# Patient Record
Sex: MALE | Race: WHITE | NOT HISPANIC OR LATINO | Employment: OTHER | URBAN - METROPOLITAN AREA
[De-identification: names, ages, dates, MRNs, and addresses within clinical notes are randomized per-mention and may not be internally consistent; named-entity substitution may affect disease eponyms.]

---

## 2018-04-16 ENCOUNTER — HOSPITAL ENCOUNTER (EMERGENCY)
Facility: HOSPITAL | Age: 78
Discharge: OTHER INSTITUTION | End: 2018-04-16
Attending: EMERGENCY MEDICINE
Payer: OTHER GOVERNMENT

## 2018-04-16 ENCOUNTER — HOSPITAL ENCOUNTER (INPATIENT)
Facility: HOSPITAL | Age: 78
LOS: 4 days | Discharge: HOME | DRG: 581 | End: 2018-04-20
Attending: EMERGENCY MEDICINE | Admitting: HOSPITALIST
Payer: OTHER GOVERNMENT

## 2018-04-16 VITALS
HEIGHT: 67 IN | BODY MASS INDEX: 29.03 KG/M2 | HEART RATE: 80 BPM | TEMPERATURE: 98 F | OXYGEN SATURATION: 97 % | DIASTOLIC BLOOD PRESSURE: 87 MMHG | SYSTOLIC BLOOD PRESSURE: 142 MMHG | RESPIRATION RATE: 18 BRPM | WEIGHT: 185 LBS

## 2018-04-16 DIAGNOSIS — L08.9 FOREIGN BODY OF RIGHT MIDDLE FINGER WITH INFECTION: Primary | ICD-10-CM

## 2018-04-16 DIAGNOSIS — L03.011 CELLULITIS OF RIGHT INDEX FINGER: ICD-10-CM

## 2018-04-16 DIAGNOSIS — L03.011 CELLULITIS OF RIGHT INDEX FINGER: Primary | ICD-10-CM

## 2018-04-16 DIAGNOSIS — S60.452A FOREIGN BODY OF RIGHT MIDDLE FINGER WITH INFECTION: Primary | ICD-10-CM

## 2018-04-16 PROBLEM — I10 ESSENTIAL HYPERTENSION: Status: ACTIVE | Noted: 2018-04-16

## 2018-04-16 LAB
ANION GAP SERPL CALC-SCNC: 10 MMOL/L
BASOPHILS # BLD AUTO: 0 K/UL
BASOPHILS NFR BLD: 0.4 %
BUN SERPL-MCNC: 27 MG/DL
CALCIUM SERPL-MCNC: 9.4 MG/DL
CHLORIDE SERPL-SCNC: 100 MMOL/L
CO2 SERPL-SCNC: 25 MMOL/L
CREAT SERPL-MCNC: 1 MG/DL
DIFFERENTIAL METHOD: ABNORMAL
EOSINOPHIL # BLD AUTO: 0.1 K/UL
EOSINOPHIL NFR BLD: 0.9 %
ERYTHROCYTE [DISTWIDTH] IN BLOOD BY AUTOMATED COUNT: 13.6 %
EST. GFR  (AFRICAN AMERICAN): >60 ML/MIN/1.73 M^2
EST. GFR  (NON AFRICAN AMERICAN): >60 ML/MIN/1.73 M^2
GLUCOSE SERPL-MCNC: 98 MG/DL
HCT VFR BLD AUTO: 37.2 %
HGB BLD-MCNC: 12.4 G/DL
LYMPHOCYTES # BLD AUTO: 1.1 K/UL
LYMPHOCYTES NFR BLD: 10 %
MCH RBC QN AUTO: 30.2 PG
MCHC RBC AUTO-ENTMCNC: 33.4 G/DL
MCV RBC AUTO: 90 FL
MONOCYTES # BLD AUTO: 0.7 K/UL
MONOCYTES NFR BLD: 6.7 %
NEUTROPHILS # BLD AUTO: 8.9 K/UL
NEUTROPHILS NFR BLD: 82 %
PLATELET # BLD AUTO: 208 K/UL
PMV BLD AUTO: 7.9 FL
POTASSIUM SERPL-SCNC: 4.6 MMOL/L
RBC # BLD AUTO: 4.11 M/UL
SODIUM SERPL-SCNC: 135 MMOL/L
WBC # BLD AUTO: 10.9 K/UL

## 2018-04-16 PROCEDURE — 85025 COMPLETE CBC W/AUTO DIFF WBC: CPT

## 2018-04-16 PROCEDURE — S0077 INJECTION, CLINDAMYCIN PHOSP: HCPCS | Performed by: EMERGENCY MEDICINE

## 2018-04-16 PROCEDURE — 99284 EMERGENCY DEPT VISIT MOD MDM: CPT | Mod: 27

## 2018-04-16 PROCEDURE — 80048 BASIC METABOLIC PNL TOTAL CA: CPT

## 2018-04-16 PROCEDURE — 25000003 PHARM REV CODE 250: Performed by: EMERGENCY MEDICINE

## 2018-04-16 PROCEDURE — 73130 X-RAY EXAM OF HAND: CPT | Mod: TC,FY,RT

## 2018-04-16 PROCEDURE — 12000002 HC ACUTE/MED SURGE SEMI-PRIVATE ROOM

## 2018-04-16 PROCEDURE — 36415 COLL VENOUS BLD VENIPUNCTURE: CPT

## 2018-04-16 PROCEDURE — 96365 THER/PROPH/DIAG IV INF INIT: CPT

## 2018-04-16 PROCEDURE — 94760 N-INVAS EAR/PLS OXIMETRY 1: CPT

## 2018-04-16 PROCEDURE — 25000003 PHARM REV CODE 250: Performed by: NURSE PRACTITIONER

## 2018-04-16 PROCEDURE — 99285 EMERGENCY DEPT VISIT HI MDM: CPT | Mod: 25

## 2018-04-16 PROCEDURE — 73130 X-RAY EXAM OF HAND: CPT | Mod: 26,RT,, | Performed by: RADIOLOGY

## 2018-04-16 RX ORDER — HYDROCODONE BITARTRATE AND ACETAMINOPHEN 10; 325 MG/1; MG/1
1 TABLET ORAL EVERY 6 HOURS PRN
Status: DISCONTINUED | OUTPATIENT
Start: 2018-04-16 | End: 2018-04-16 | Stop reason: SDUPTHER

## 2018-04-16 RX ORDER — HYDROCODONE BITARTRATE AND ACETAMINOPHEN 5; 325 MG/1; MG/1
1 TABLET ORAL EVERY 6 HOURS PRN
Status: DISCONTINUED | OUTPATIENT
Start: 2018-04-16 | End: 2018-04-19 | Stop reason: SDUPTHER

## 2018-04-16 RX ORDER — OXYCODONE AND ACETAMINOPHEN 5; 325 MG/1; MG/1
1 TABLET ORAL
Status: COMPLETED | OUTPATIENT
Start: 2018-04-16 | End: 2018-04-16

## 2018-04-16 RX ORDER — HYDROCODONE BITARTRATE AND ACETAMINOPHEN 7.5; 325 MG/1; MG/1
1 TABLET ORAL
Status: DISCONTINUED | OUTPATIENT
Start: 2018-04-16 | End: 2018-04-16

## 2018-04-16 RX ORDER — OXYCODONE HYDROCHLORIDE 5 MG/1
5 TABLET ORAL EVERY 6 HOURS PRN
Status: DISCONTINUED | OUTPATIENT
Start: 2018-04-16 | End: 2018-04-18

## 2018-04-16 RX ORDER — ONDANSETRON 2 MG/ML
4 INJECTION INTRAMUSCULAR; INTRAVENOUS EVERY 8 HOURS PRN
Status: DISCONTINUED | OUTPATIENT
Start: 2018-04-16 | End: 2018-04-20 | Stop reason: HOSPADM

## 2018-04-16 RX ORDER — ACETAMINOPHEN 325 MG/1
650 TABLET ORAL EVERY 6 HOURS PRN
Status: DISCONTINUED | OUTPATIENT
Start: 2018-04-16 | End: 2018-04-18

## 2018-04-16 RX ORDER — AMOXICILLIN 250 MG
1 CAPSULE ORAL DAILY PRN
Status: DISCONTINUED | OUTPATIENT
Start: 2018-04-16 | End: 2018-04-20 | Stop reason: HOSPADM

## 2018-04-16 RX ORDER — CLINDAMYCIN PHOSPHATE 900 MG/50ML
900 INJECTION, SOLUTION INTRAVENOUS
Status: COMPLETED | OUTPATIENT
Start: 2018-04-16 | End: 2018-04-16

## 2018-04-16 RX ADMIN — HYDROCODONE BITARTRATE AND ACETAMINOPHEN 1 TABLET: 5; 325 TABLET ORAL at 10:04

## 2018-04-16 RX ADMIN — CLINDAMYCIN IN 5 PERCENT DEXTROSE 900 MG: 18 INJECTION, SOLUTION INTRAVENOUS at 08:04

## 2018-04-16 RX ADMIN — OXYCODONE HYDROCHLORIDE AND ACETAMINOPHEN 1 TABLET: 5; 325 TABLET ORAL at 06:04

## 2018-04-16 NOTE — ED NOTES
Pt accepted to [facility] by Dr. Sheehan in the [place]. Ortho Dr. Arroyo aware of pt. Phone number for report is [phone].

## 2018-04-16 NOTE — ED TRIAGE NOTES
C/o pain, swelling and redness to r middle finger. States was stuck by piece of glass Thursday. States removed small piece of glass and pus Saturday evening. reports swelling worse on Sunday.

## 2018-04-16 NOTE — ED NOTES
[facility] called for othro/plastics/hand specialist to admit the patient. Awaiting call back for acceptance.

## 2018-04-16 NOTE — PROVIDER PROGRESS NOTES - EMERGENCY DEPT.
Encounter Date: 4/16/2018    ED Physician Progress Notes        Physician Note:   1500 Spoke examined hand and xray. Plan to transfer

## 2018-04-16 NOTE — ED PROVIDER NOTES
Encounter Date: 4/16/2018       History     Chief Complaint   Patient presents with    Cellulitis     Patient cut right 3rd digit with glass last week. Finger/hand red, swollen and painful. Patient concerned about glass still in finger.        General Illness    The problem has been gradually worsening. The pain is at a severity of 8/10. Nothing relieves the symptoms. Nothing aggravates the symptoms. Pertinent negatives include no fever, no nausea, no sore throat, no shortness of breath and no rash.     Review of patient's allergies indicates:   Allergen Reactions    Aspirin (bulk) Swelling    Sulfa (sulfonamide antibiotics)      Past Medical History:   Diagnosis Date    Hypertension      Past Surgical History:   Procedure Laterality Date    CARPAL TUNNEL RELEASE Bilateral     HIP ARTHROPLASTY Left      No family history on file.  Social History   Substance Use Topics    Smoking status: Former Smoker     Quit date: 4/16/1997    Smokeless tobacco: Never Used    Alcohol use No     Review of Systems   Constitutional: Negative for fever.   HENT: Negative for sore throat.    Respiratory: Negative for shortness of breath.    Cardiovascular: Negative for chest pain.   Gastrointestinal: Negative for nausea.   Genitourinary: Negative for dysuria.   Musculoskeletal: Positive for joint swelling (Swelling right 3rd digit). Negative for back pain.   Skin: Negative for rash.   Neurological: Negative for weakness.   Hematological: Does not bruise/bleed easily.       Physical Exam     Initial Vitals [04/16/18 1217]   BP Pulse Resp Temp SpO2   117/89 93 18 98.1 °F (36.7 °C) 95 %      MAP       98.33         Physical Exam    Nursing note and vitals reviewed.  Constitutional: He appears well-developed and well-nourished.   HENT:   Head: Normocephalic.   Eyes: Pupils are equal, round, and reactive to light.   Neck: Normal range of motion.   Cardiovascular: Normal rate and regular rhythm.   Pulmonary/Chest: Breath sounds  normal.   Musculoskeletal:        Right hand: Right middle finger: Exhibits ecchymosis, swelling, tenderness and decreased ROM.        Left hand: Left middle finger: Exhibits swelling, tenderness and ecchymosis.   Neurological: He is alert and oriented to person, place, and time.   Skin: Skin is warm and dry.   Psychiatric: He has a normal mood and affect. His behavior is normal. Judgment and thought content normal.         ED Course   Procedures  Labs Reviewed - No data to display                 Imaging Results          X-Ray Hand 3 View Right (Final result)  Result time 04/16/18 13:45:06    Final result by Kayleigh Mchugh MD (04/16/18 13:45:06)                 Impression:      Soft tissue swelling right middle/long finger at the proximal interphalangeal joint.  Findings suggesting small foreign body in the distal aspect of the right long/middle finger anterior to the distal phalanx, seen on one view only with correlation recommended clinically.    Extensive changes at the wrist appearing similar although progressive compared to prior study of 12/20 7/2014.      Electronically signed by: Kayleigh Mchugh MD  Date:    04/16/2018  Time:    13:45             Narrative:    EXAMINATION:  XR HAND COMPLETE 3 VIEW RIGHT    CLINICAL HISTORY:  Poss. F.B 3rd digit;    TECHNIQUE:  Three views right hand    COMPARISON:  Right wrist 12/27/2014    FINDINGS:  There is soft tissue swelling right long/middle finger more so proximally at the proximal interphalangeal joint.  There is thin linear 2.5 mm in length density projecting in or over the soft tissues of the distal aspect of that right long/middle finger anterior to the distal phalanx, distal to the site of the soft tissue swelling.  This is seen on the lateral view only and could be artifact or be obscured by the bone on the other views.  Recommend correlation clinically as to if this this is site of suspected foreign body.    There is severe degenerative change,  juxta-articular ossifications, soft tissue swelling at the wrist.  There is severe narrowing of radial carpal joint between the radius and navicula with small misshapen navicular suggesting sequela of old fracture avascular necrosis of the navicular could be present.  These findings appears similar although progressive compared to the prior exam.    There is no acute fracture or dislocation.                                     Attending Attestation:   Physician Attestation Statement for Resident:  As the supervising MD   Physician Attestation Statement: I have personally seen and examined this patient.   I agree with the above history. -:   As the supervising MD I agree with the above PE.   -: No acute tenosynovitis    As the supervising MD I agree with the above treatment, course, plan, and disposition.   -: Rec transfer to Ortho for potential need of I and D   I have reviewed and agree with the residents interpretation of the following: x-rays.            Pt is transferred for ortho evaluation for possible need of I and D of this infected index.    The above noted FB at the fingerpad is non-acute by exam of patients recent injury.               Clinical Impression:   The encounter diagnosis was Cellulitis of right index finger.                           Tapan Nance MD  04/16/18 1758       Tapan Nance MD  07/14/18 1221       Tapan Nance MD  07/14/18 1221       Tapan Nance MD  07/24/18 7546

## 2018-04-17 PROCEDURE — 25000003 PHARM REV CODE 250: Performed by: NURSE PRACTITIONER

## 2018-04-17 PROCEDURE — 99253 IP/OBS CNSLTJ NEW/EST LOW 45: CPT | Mod: 25,,, | Performed by: ORTHOPAEDIC SURGERY

## 2018-04-17 PROCEDURE — 12000002 HC ACUTE/MED SURGE SEMI-PRIVATE ROOM

## 2018-04-17 PROCEDURE — 25000003 PHARM REV CODE 250: Performed by: HOSPITALIST

## 2018-04-17 PROCEDURE — 94761 N-INVAS EAR/PLS OXIMETRY MLT: CPT

## 2018-04-17 PROCEDURE — S0077 INJECTION, CLINDAMYCIN PHOSP: HCPCS | Performed by: NURSE PRACTITIONER

## 2018-04-17 RX ORDER — TRAMADOL HYDROCHLORIDE 50 MG/1
100 TABLET ORAL EVERY 4 HOURS PRN
Status: DISCONTINUED | OUTPATIENT
Start: 2018-04-17 | End: 2018-04-17

## 2018-04-17 RX ORDER — LISINOPRIL 10 MG/1
10 TABLET ORAL DAILY
Status: DISCONTINUED | OUTPATIENT
Start: 2018-04-17 | End: 2018-04-18

## 2018-04-17 RX ORDER — CLINDAMYCIN PHOSPHATE 900 MG/50ML
900 INJECTION, SOLUTION INTRAVENOUS
Status: DISCONTINUED | OUTPATIENT
Start: 2018-04-17 | End: 2018-04-20 | Stop reason: HOSPADM

## 2018-04-17 RX ORDER — CYCLOBENZAPRINE HCL 5 MG
5 TABLET ORAL 3 TIMES DAILY PRN
Status: DISCONTINUED | OUTPATIENT
Start: 2018-04-17 | End: 2018-04-18

## 2018-04-17 RX ADMIN — CLINDAMYCIN IN 5 PERCENT DEXTROSE 900 MG: 18 INJECTION, SOLUTION INTRAVENOUS at 02:04

## 2018-04-17 RX ADMIN — HYDROCODONE BITARTRATE AND ACETAMINOPHEN 1 TABLET: 5; 325 TABLET ORAL at 09:04

## 2018-04-17 RX ADMIN — CLINDAMYCIN IN 5 PERCENT DEXTROSE 900 MG: 18 INJECTION, SOLUTION INTRAVENOUS at 04:04

## 2018-04-17 RX ADMIN — HYDROCODONE BITARTRATE AND ACETAMINOPHEN 1 TABLET: 5; 325 TABLET ORAL at 04:04

## 2018-04-17 RX ADMIN — HYDROCODONE BITARTRATE AND ACETAMINOPHEN 1 TABLET: 5; 325 TABLET ORAL at 11:04

## 2018-04-17 RX ADMIN — OXYCODONE HYDROCHLORIDE 5 MG: 5 TABLET ORAL at 08:04

## 2018-04-17 RX ADMIN — OXYCODONE HYDROCHLORIDE 5 MG: 5 TABLET ORAL at 02:04

## 2018-04-17 RX ADMIN — OXYCODONE HYDROCHLORIDE 5 MG: 5 TABLET ORAL at 01:04

## 2018-04-17 RX ADMIN — SODIUM CHLORIDE 250 ML: 0.9 INJECTION, SOLUTION INTRAVENOUS at 06:04

## 2018-04-17 RX ADMIN — CLINDAMYCIN IN 5 PERCENT DEXTROSE 900 MG: 18 INJECTION, SOLUTION INTRAVENOUS at 09:04

## 2018-04-17 RX ADMIN — CLINDAMYCIN IN 5 PERCENT DEXTROSE 900 MG: 18 INJECTION, SOLUTION INTRAVENOUS at 08:04

## 2018-04-17 NOTE — SUBJECTIVE & OBJECTIVE
Past Medical History:   Diagnosis Date    Hypertension        Past Surgical History:   Procedure Laterality Date    CARPAL TUNNEL RELEASE Bilateral     HIP ARTHROPLASTY Left        Review of patient's allergies indicates:   Allergen Reactions    Aspirin (bulk) Swelling    Sulfa (sulfonamide antibiotics)        Current Facility-Administered Medications   Medication    acetaminophen tablet 650 mg    clindamycin 900 MG/50 ML D5W 900 mg/50 mL IVPB 900 mg    cyclobenzaprine tablet 5 mg    hydrocodone-acetaminophen 5-325mg per tablet 1 tablet    lisinopril tablet 10 mg    ondansetron injection 4 mg    oxyCODONE immediate release tablet 5 mg    senna-docusate 8.6-50 mg per tablet 1 tablet     Family History     Problem Relation (Age of Onset)    No Known Problems Mother, Father        Social History Main Topics    Smoking status: Former Smoker     Quit date: 4/16/1997    Smokeless tobacco: Never Used    Alcohol use No    Drug use: No    Sexual activity: Not on file     Review of Systems   Constitution: Negative for chills and fever.   HENT: Negative for congestion.    Eyes: Negative for blurred vision.   Cardiovascular: Negative for chest pain and syncope.   Respiratory: Negative for cough and shortness of breath.    Endocrine: Negative for polyuria.   Hematologic/Lymphatic: Negative for bleeding problem. Does not bruise/bleed easily.   Skin: Negative for rash.   Musculoskeletal: Positive for joint pain and joint swelling. Negative for falls, muscle cramps, muscle weakness and myalgias.   Gastrointestinal: Negative for abdominal pain, nausea and vomiting.   Genitourinary: Negative for flank pain.   Neurological: Negative for numbness and seizures.   Psychiatric/Behavioral: Negative for altered mental status.   Allergic/Immunologic: Negative for persistent infections.     Objective:     Vital Signs (Most Recent):  Temp: 98.1 °F (36.7 °C) (04/17/18 0722)  Pulse: 83 (04/17/18 0722)  Resp: 16 (04/17/18  "0722)  BP: 102/66 (04/17/18 0722)  SpO2: (!) 94 % (04/17/18 1000) Vital Signs (24h Range):  Temp:  [97.9 °F (36.6 °C)-98.1 °F (36.7 °C)] 98.1 °F (36.7 °C)  Pulse:  [62-93] 83  Resp:  [16-18] 16  SpO2:  [92 %-100 %] 94 %  BP: (102-142)/(66-97) 102/66        Height: 5' 7" (170.2 cm)  Body mass index is 28.98 kg/m².    No intake or output data in the 24 hours ending 04/17/18 1124    General    Nursing note and vitals reviewed.  Constitutional: He is oriented to person, place, and time. He appears well-developed and well-nourished. No distress.   HENT:   Nose: Nose normal.   Eyes: EOM are normal.   Cardiovascular: Regular rhythm.    Pulmonary/Chest: Effort normal.   Abdominal: Soft.   Neurological: He is alert and oriented to person, place, and time.   Psychiatric: His behavior is normal.             Right Hand/Wrist Exam     Swelling   Hand - The patient is swollen on the middle MCP and middle IP.    Other     Neuorologic Exam    Median Distribution: normal  Ulnar Distribution: normal  Radial Distribution: normal    Comments:  Small opening with drainage and redness along the ulnar aspect near the PIP joint. No fluctuance. No pain over palm or distally      Left Hand/Wrist Exam   Left hand exam is normal.          Vascular Exam       Capillary Refill  Right Hand: normal capillary refill      Significant Labs:   CBC:   Recent Labs  Lab 04/16/18 1952   WBC 10.90   HGB 12.4*   HCT 37.2*        CMP:   Recent Labs  Lab 04/16/18 1952   *   K 4.6      CO2 25   GLU 98   BUN 27*   CREATININE 1.0   CALCIUM 9.4   ANIONGAP 10   EGFRNONAA >60     CRP: No results for input(s): CRP in the last 48 hours.  All pertinent labs within the past 24 hours have been reviewed.    Significant Imaging: U/S: I have reviewed all pertinent results/findings and my personal findings are:  neg for abscess or foreign body  "

## 2018-04-17 NOTE — ED PROVIDER NOTES
"Encounter Date: 4/16/2018    SCRIBE #1 NOTE: I, Mike Mcfarland, am scribing for, and in the presence of, Dr. Sheehan .       History     Chief Complaint   Patient presents with    Wound Infection     with FB(glass) to right long finger       04/16/2018 7:37 PM     Chief complaint: Foreign body in right long finger      Larry Lepley is a 77 y.o. male with a hx of HTN who presents to the ED with complaints of foreign body in right long finger s/p injury x 4 days. The affected finger is swollen and erythematous. Pt states he attempted to separate his dogs and hit his hand in a window scattering the window. He immediately noticed the glass was in the finger. Transferred here for ortho consult, case d/w Dr. Arroyo by sending facility PTA.      The history is provided by the patient.     Review of patient's allergies indicates:   Allergen Reactions    Aspirin (bulk) Swelling    Sulfa (sulfonamide antibiotics)      Past Medical History:   Diagnosis Date    Hypertension      Past Surgical History:   Procedure Laterality Date    CARPAL TUNNEL RELEASE Bilateral     HIP ARTHROPLASTY Left      History reviewed. No pertinent family history.  Social History   Substance Use Topics    Smoking status: Former Smoker     Quit date: 4/16/1997    Smokeless tobacco: Never Used    Alcohol use No     Review of Systems   Gastrointestinal: Negative for diarrhea, nausea and vomiting.   Skin: Positive for color change and wound.        + for "foreign body" and "finger swelling"   Neurological: Negative for numbness.   All other systems reviewed and are negative.      Physical Exam     There were no vitals filed for this visit.    Physical Exam    Nursing note and vitals reviewed.  Constitutional: He appears well-developed and well-nourished. He is not diaphoretic.  Non-toxic appearance. He does not have a sickly appearance. He does not appear ill. No distress.   HENT:   Head: Normocephalic and atraumatic.   Eyes: EOM are normal. "   Neck: Normal range of motion. Neck supple. Normal range of motion present. No neck rigidity.   Cardiovascular: Normal rate, regular rhythm and normal heart sounds. Exam reveals no gallop and no friction rub.    No murmur heard.  Pulmonary/Chest: Breath sounds normal. No respiratory distress. He has no wheezes. He has no rhonchi. He has no rales.   Musculoskeletal: Normal range of motion.   Diffuse swollen to right middle finger. Small abrasion on the medial side of the PIP. No tenderness along flexor tendon sheath. No tenderness along hand. Diminished ROM. No obvious abscess. Not held in flexion, no pain on extension. No palm tenderness     Neurological: He is alert and oriented to person, place, and time.   Skin: Skin is warm and dry. No rash noted.   Psychiatric: He has a normal mood and affect. His behavior is normal. Judgment and thought content normal.         ED Course   Procedures  Labs Reviewed   CBC W/ AUTO DIFFERENTIAL   BASIC METABOLIC PANEL             Medical Decision Making:   History:   Old Medical Records: I decided to obtain old medical records.  Clinical Tests:   Lab Tests: Ordered and Reviewed  Radiological Study: Reviewed            Scribe Attestation:   Scribe #1: I performed the above scribed service and the documentation accurately describes the services I performed. I attest to the accuracy of the note.    I, Dr. Sheehan, personally performed the services described in this documentation. All medical record entries made by the scribe were at my direction and in my presence.  I have reviewed the chart and agree that the record reflects my personal performance and is accurate and complete.10:48 PM 04/16/2018            ED Course as of Apr 16 2006 Mon Apr 16, 2018 1948 Case d/w dr solis, [place] after [place]n  [EF]   1953 [facility] medicine to admit the patient  [EF]   2002 WBC: 10.90 [EF]      ED Course User Index  [EF] Giovany Sheehan MD     Clinical Impression:     1. Foreign  body of right middle finger with infection                RML finger infection, no abscess to drain, do not think flexor tenosynovitis, case d/w orthopedics. NPO after midnight.                                Giovany Sheehan MD  04/16/18 1528

## 2018-04-17 NOTE — ED NOTES
To Room:  308 by W/C    AA & O x 3, skin warm and dry, in NAD, Saline Lock in place and patent, site clean and dry.

## 2018-04-17 NOTE — HPI
Mr. Lepley is a 76yo M with a PMH of HTN. He had presented to [place] with c/o finger pain and swelling. He cut his 3rd digit to his right hand on glass 4 days ago while trying to separate his dogs from fighting. He did notice glass in his finger. His finger and hand Has had worseining symptoms of redness, pain, and swelling. While at [place], he had an Xray which showed a foreign object to the right 3rd finger. He was transferred to [facility] for orthopedics services. While in the ED here, Dr. Tripp was notified of the patient case. He recommends kepping patient NPO after midnight for possible I&D and he will see him in the morning. IV Clindamycin was initiated. He currently denies pain or discomforts.

## 2018-04-17 NOTE — ED NOTES
Patient identifiers for Larry Lepley checked and correct.  LOC:  Patient is awake, alert, and aware of environment with an appropriate affect. Patient is oriented x 3 and speaking appropriately.  APPEARANCE:  Patient resting comfortably and in no acute distress. Patient is clean and well groomed, patient's clothing is properly fastened.  SKIN:  The skin is warm and dry. Patient has normal skin turgor and moist mucus membranes. Right long finger is red, swollen and tender with decreased ROM, small puncture wound to side of finger with FB (glass) under skin.  MUSCULOSKELETAL:  Patient is moving all extremities well, no obvious deformities noted. Pulses intact.   RESPIRATORY:  Airway is open and patent. Respirations are spontaneous and non-labored with normal effort and rate.  CARDIAC:  Patient has a normal rate and rhythm. No peripheral edema noted.   ABDOMEN:  No distention noted.    NEUROLOGICAL:  PERRL. Facial expression is symmetrical.   Allergies reported:    Review of patient's allergies indicates:   Allergen Reactions    Aspirin (bulk) Swelling    Sulfa (sulfonamide antibiotics)      OTHER NOTES:  Transfer in with FB and infection to right long finger.

## 2018-04-17 NOTE — SUBJECTIVE & OBJECTIVE
Past Medical History:   Diagnosis Date    Hypertension        Past Surgical History:   Procedure Laterality Date    CARPAL TUNNEL RELEASE Bilateral     HIP ARTHROPLASTY Left        Review of patient's allergies indicates:   Allergen Reactions    Aspirin (bulk) Swelling    Sulfa (sulfonamide antibiotics)        Current Facility-Administered Medications on File Prior to Encounter   Medication    [COMPLETED] oxyCODONE-acetaminophen 5-325 mg per tablet 1 tablet    [DISCONTINUED] hydrocodone-acetaminophen 7.5-325mg per tablet 1 tablet     No current outpatient prescriptions on file prior to encounter.     Family History     Mother--Unk health problems  Father-D-Unk health problems        Social History Main Topics    Smoking status: Former Smoker     Quit date: 1997    Smokeless tobacco: Never Used    Alcohol use No    Drug use: No    Sexual activity: Not on file     Review of Systems   Constitutional: Negative for chills and fever.   HENT: Negative for congestion.    Eyes: Negative for visual disturbance.   Respiratory: Negative for shortness of breath.    Cardiovascular: Negative for chest pain and palpitations.   Gastrointestinal: Negative for abdominal pain, diarrhea, nausea and vomiting.   Genitourinary: Negative for dysuria.   Musculoskeletal: Negative for arthralgias.   Skin: Positive for color change and wound.   Neurological: Negative for dizziness, syncope and headaches.   Psychiatric/Behavioral: Negative for confusion and hallucinations.     Objective:     Vital Signs (Most Recent):    Vital Signs (24h Range):  Temp:  [98.1 °F (36.7 °C)] 98.1 °F (36.7 °C)  Pulse:  [75-93] 80  Resp:  [18] 18  SpO2:  [94 %-100 %] 97 %  BP: (116-142)/(87-97) 142/87        There is no height or weight on file to calculate BMI.    Physical Exam   Constitutional: He is oriented to person, place, and time. No distress.   HENT:   Head: Normocephalic.   Eyes: Pupils are equal, round, and reactive to light.    Neck: Normal range of motion. Neck supple. No JVD present. No tracheal deviation present.   Cardiovascular: Normal rate, regular rhythm, normal heart sounds and intact distal pulses.    Pulmonary/Chest: Effort normal and breath sounds normal. No respiratory distress.   Abdominal: Soft. Bowel sounds are normal. He exhibits no distension. There is no tenderness.   Musculoskeletal: Normal range of motion. He exhibits edema and tenderness.   3rd digit to right hand red, swollen, and tender   Neurological: He is alert and oriented to person, place, and time. No cranial nerve deficit.   Skin: Skin is warm and dry. Capillary refill takes less than 2 seconds. There is erythema.   Small abrasion on the medial side right 3rd digit   Psychiatric: He has a normal mood and affect. His behavior is normal. Judgment and thought content normal.         CRANIAL NERVES     CN III, IV, VI   Pupils are equal, round, and reactive to light.       Significant Labs:   BMP:   Recent Labs  Lab 04/16/18 1952   GLU 98   *   K 4.6      CO2 25   BUN 27*   CREATININE 1.0   CALCIUM 9.4     CBC:   Recent Labs  Lab 04/16/18 1952   WBC 10.90   HGB 12.4*   HCT 37.2*          Significant Imaging:     Right Hand Xray: Reviewed radiologist's report-- Impression   Soft tissue swelling right middle/long finger at the proximal interphalangeal joint.  Findings suggesting small foreign body in the distal aspect of the right long/middle finger anterior to the distal phalanx, seen on one view only with correlation recommended clinically.  Extensive changes at the wrist appearing similar although progressive compared to prior study of 12/20 7/2014.

## 2018-04-17 NOTE — PLAN OF CARE
Met with patient at bedside -re: [facility] primary benefit.  Patient's PCP is at [facility]- and patient receives all [facility] care thru the [facility] in [place]. Patient reports receives disability/services since 1980.  BIJAL North RN [facility]     04/17/18 8097   Discharge Assessment   Assessment Type Discharge Planning Assessment

## 2018-04-17 NOTE — PLAN OF CARE
Problem: Patient Care Overview  Goal: Plan of Care Review  Outcome: Ongoing (interventions implemented as appropriate)  Plan of care reviewed, patient verbalized understanding. AAOx4. Up ab dipak. PIV infusing per order. IV abx infusing per order. VS addressed. Remain afebrile throughout shift. Pain controlled with PRN meds. Right hand elevated on pillows per order. Bed in lowest position, side rails up x2, call light within reach. Patient remains free from fall and injury. Will continue to monitor.

## 2018-04-17 NOTE — CONSULTS
[facility]  Orthopedics  Consult Note    Patient Name: Larry Lepley  MRN: [id number]  Admission Date: 4/16/2018  Hospital Length of Stay: 1 days  Attending Provider: Carolee Garibay MD  Primary Care Provider: [facility] [place]    Patient information was obtained from patient, past medical records and ER records.     Inpatient consult to Orthopedic Surgery  Consult performed by: ALONZO CONLEY  Consult ordered by: SHERON MULLIGAN  Reason for consult: Finger infection        Subjective:     Principal Problem:Foreign body of right middle finger with infection    Chief Complaint:   Chief Complaint   Patient presents with    Wound Infection     with FB(glass) to right long finger        HPI: Mr. Lepley is a 78yo M with a PMH of HTN. He had presented to [facility] with c/o finger pain and swelling. He cut his 3rd digit to his right hand on glass 4 days ago while trying to separate his dogs from fighting. He did notice glass in his finger. His finger and hand Has had worseining symptoms of redness, pain, and swelling. While at [place], he had an Xray which showed a possible foreign object to the right 3rd finger. He was transferred to [facility] for orthopedics services. IV Clindamycin was initiated. US showed no abscess or foreign body    Past Medical History:   Diagnosis Date    Hypertension        Past Surgical History:   Procedure Laterality Date    CARPAL TUNNEL RELEASE Bilateral     HIP ARTHROPLASTY Left        Review of patient's allergies indicates:   Allergen Reactions    Aspirin (bulk) Swelling    Sulfa (sulfonamide antibiotics)        Current Facility-Administered Medications   Medication    acetaminophen tablet 650 mg    clindamycin 900 MG/50 ML D5W 900 mg/50 mL IVPB 900 mg    cyclobenzaprine tablet 5 mg    hydrocodone-acetaminophen 5-325mg per tablet 1 tablet    lisinopril tablet 10 mg    ondansetron injection 4 mg    oxyCODONE immediate release tablet 5 mg     "senna-docusate 8.6-50 mg per tablet 1 tablet     Family History     Problem Relation (Age of Onset)    No Known Problems Mother, Father        Social History Main Topics    Smoking status: Former Smoker     Quit date: 4/16/1997    Smokeless tobacco: Never Used    Alcohol use No    Drug use: No    Sexual activity: Not on file     Review of Systems   Constitution: Negative for chills and fever.   HENT: Negative for congestion.    Eyes: Negative for blurred vision.   Cardiovascular: Negative for chest pain and syncope.   Respiratory: Negative for cough and shortness of breath.    Endocrine: Negative for polyuria.   Hematologic/Lymphatic: Negative for bleeding problem. Does not bruise/bleed easily.   Skin: Negative for rash.   Musculoskeletal: Positive for joint pain and joint swelling. Negative for falls, muscle cramps, muscle weakness and myalgias.   Gastrointestinal: Negative for abdominal pain, nausea and vomiting.   Genitourinary: Negative for flank pain.   Neurological: Negative for numbness and seizures.   Psychiatric/Behavioral: Negative for altered mental status.   Allergic/Immunologic: Negative for persistent infections.     Objective:     Vital Signs (Most Recent):  Temp: 98.1 °F (36.7 °C) (04/17/18 0722)  Pulse: 83 (04/17/18 0722)  Resp: 16 (04/17/18 0722)  BP: 102/66 (04/17/18 0722)  SpO2: (!) 94 % (04/17/18 1000) Vital Signs (24h Range):  Temp:  [97.9 °F (36.6 °C)-98.1 °F (36.7 °C)] 98.1 °F (36.7 °C)  Pulse:  [62-93] 83  Resp:  [16-18] 16  SpO2:  [92 %-100 %] 94 %  BP: (102-142)/(66-97) 102/66        Height: 5' 7" (170.2 cm)  Body mass index is 28.98 kg/m².    No intake or output data in the 24 hours ending 04/17/18 1124    General    Nursing note and vitals reviewed.  Constitutional: He is oriented to person, place, and time. He appears well-developed and well-nourished. No distress.   HENT:   Nose: Nose normal.   Eyes: EOM are normal.   Cardiovascular: Regular rhythm.    Pulmonary/Chest: Effort " normal.   Abdominal: Soft.   Neurological: He is alert and oriented to person, place, and time.   Psychiatric: His behavior is normal.             Right Hand/Wrist Exam     Swelling   Hand - The patient is swollen on the middle MCP and middle IP.    Other     Neuorologic Exam    Median Distribution: normal  Ulnar Distribution: normal  Radial Distribution: normal    Comments:  Small opening with drainage and redness along the ulnar aspect near the PIP joint. No fluctuance. No pain over palm or distally      Left Hand/Wrist Exam   Left hand exam is normal.          Vascular Exam       Capillary Refill  Right Hand: normal capillary refill      Significant Labs:   CBC:   Recent Labs  Lab 04/16/18 1952   WBC 10.90   HGB 12.4*   HCT 37.2*        CMP:   Recent Labs  Lab 04/16/18 1952   *   K 4.6      CO2 25   GLU 98   BUN 27*   CREATININE 1.0   CALCIUM 9.4   ANIONGAP 10   EGFRNONAA >60     CRP: No results for input(s): CRP in the last 48 hours.  All pertinent labs within the past 24 hours have been reviewed.    Significant Imaging: U/S: I have reviewed all pertinent results/findings and my personal findings are:  neg for abscess or foreign body    Assessment/Plan:     * Foreign body of right middle finger with infection    No foreign body seen on Ultrasound.  Will repeat Xrays.  Continue IV antibiotics  Ok to eat today            Thank you for your consult. I will follow-up with patient. Please contact us if you have any additional questions.    Stan Arroyo MD  Orthopedics  [facility]

## 2018-04-17 NOTE — PLAN OF CARE
Met with pt to complete his assessment.  Educated pt on the blue discharge folder and left the folder in the room.  Pt was [facility] for ortho services.  Pt, who is independent with his self care, has a home BP cuff, rollator and SC, denies home health services.  Pt stated that he lives with his exwife's step-daughter Kathleen who reportedly had pt's phone.  Pt was not able to verify his daughters phone number or provide me with his PCP's name.  Pt verified his [facility] as the [facility].  I called Kathleen who provided me with her last name Damari and phone number [phone].  I added her as an emergency  and updated pt's daughter Milena Danielle's correct phone # [phone]. [facility] [place] [place].  Updated admissions who denied having her name in Epic. CG Kathleen is aware that she will have to find transportation to transport pt home upon his discharge.  Kathleen is requesting to speak to pt's physician.  Updated Dr. Garibay.      04/17/18 1450   Discharge Assessment   Assessment Type Discharge Planning Assessment   Confirmed/corrected address and phone number on facesheet? Yes   Assessment information obtained from? Patient;Other  (Pt and pt's CG Kathleen by phone.)   Prior to hospitilization cognitive status: Alert/Oriented   Prior to hospitalization functional status: Independent   Current cognitive status: Alert/Oriented   Current Functional Status: Independent   Lives With other (see comments)  (Pt lives with his ex wife's step-daughter Kathleen Wetzel. )   Able to Return to Prior Arrangements yes   Is patient able to care for self after discharge? Yes   Who are your caregiver(s) and their phone number(s)? (CG Kathleen Huizar, [phone])   Patient's perception of discharge disposition home or selfcare   Readmission Within The Last 30 Days no previous admission in last 30 days   Patient currently being followed by outpatient  case management? No   Patient currently receives any other outside agency services? No   Equipment Currently Used at Home rollator;shower chair;other (see comments)  (BP cuff)   Do you have any problems affording any of your prescribed medications? No  ([facility] [place])   Is the patient taking medications as prescribed? yes   Does the patient have transportation home? Yes   Transportation Available family or friend will provide   Does the patient receive services at the [facility]? No   Discharge Plan A Home with family   Patient/Family In Agreement With Plan yes

## 2018-04-17 NOTE — HPI
Mr. Lepley is a 76yo M with a PMH of HTN. He had presented to [facility] with c/o finger pain and swelling. He cut his 3rd digit to his right hand on glass 4 days ago while trying to separate his dogs from fighting. He did notice glass in his finger. His finger and hand Has had worseining symptoms of redness, pain, and swelling. While at [place], he had an Xray which showed a possible foreign object to the right 3rd finger. He was transferred to [facility] for [place]opedics services. IV Clindamycin was initiated. US showed no abscess or foreign body

## 2018-04-17 NOTE — H&P
[facility] Hospital Medicine  History & Physical    Patient Name: Larry Lepley  MRN: [id number]  Admission Date: 2018  Attending Physician: Carolee Garibay MD   Primary Care Provider: [facility] [place]         Patient information was obtained from patient and ER records.     Subjective:     Principal Problem:Foreign body of right middle finger with infection    Chief Complaint:   Chief Complaint   Patient presents with    Wound Infection     with FB(glass) to right long finger        HPI: Mr. Lepley is a 78yo M with a PMH of HTN. He had presented to [facility] with c/o finger pain and swelling. He cut his 3rd digit to his right hand on glass 4 days ago while trying to separate his dogs from fighting. He did notice glass in his finger. His finger and hand Has had worseining symptoms of redness, pain, and swelling. While at [place], he had an Xray which showed a foreign object to the right 3rd finger. He was transferred to [facility] for orthopedics services. While in the ED here, Dr. Tripp was notified of the patient case. He recommends kepping patient NPO after midnight for possible I&D and he will see him in the morning. IV Clindamycin was initiated. He currently denies pain or discomforts.          Past Medical History:   Diagnosis Date    Hypertension        Past Surgical History:   Procedure Laterality Date    CARPAL TUNNEL RELEASE Bilateral     HIP ARTHROPLASTY Left        Review of patient's allergies indicates:   Allergen Reactions    Aspirin (bulk) Swelling    Sulfa (sulfonamide antibiotics)        Current Facility-Administered Medications on File Prior to Encounter   Medication    [COMPLETED] oxyCODONE-acetaminophen 5-325 mg per tablet 1 tablet    [DISCONTINUED] hydrocodone-acetaminophen 7.5-325mg per tablet 1 tablet     No current outpatient prescriptions on file prior to encounter.     Family History     Mother--Unk health problems  Father-D-Unk  health problems        Social History Main Topics    Smoking status: Former Smoker     Quit date: 4/16/1997    Smokeless tobacco: Never Used    Alcohol use No    Drug use: No    Sexual activity: Not on file     Review of Systems   Constitutional: Negative for chills and fever.   HENT: Negative for congestion.    Eyes: Negative for visual disturbance.   Respiratory: Negative for shortness of breath.    Cardiovascular: Negative for chest pain and palpitations.   Gastrointestinal: Negative for abdominal pain, diarrhea, nausea and vomiting.   Genitourinary: Negative for dysuria.   Musculoskeletal: Negative for arthralgias.   Skin: Positive for color change and wound.   Neurological: Negative for dizziness, syncope and headaches.   Psychiatric/Behavioral: Negative for confusion and hallucinations.     Objective:     Vital Signs (Most Recent):    Vital Signs (24h Range):  Temp:  [98.1 °F (36.7 °C)] 98.1 °F (36.7 °C)  Pulse:  [75-93] 80  Resp:  [18] 18  SpO2:  [94 %-100 %] 97 %  BP: (116-142)/(87-97) 142/87        There is no height or weight on file to calculate BMI.    Physical Exam   Constitutional: He is oriented to person, place, and time. No distress.   HENT:   Head: Normocephalic.   Eyes: Pupils are equal, round, and reactive to light.   Neck: Normal range of motion. Neck supple. No JVD present. No tracheal deviation present.   Cardiovascular: Normal rate, regular rhythm, normal heart sounds and intact distal pulses.    Pulmonary/Chest: Effort normal and breath sounds normal. No respiratory distress.   Abdominal: Soft. Bowel sounds are normal. He exhibits no distension. There is no tenderness.   Musculoskeletal: Normal range of motion. He exhibits edema and tenderness.   3rd digit to right hand red, swollen, and tender   Neurological: He is alert and oriented to person, place, and time. No cranial nerve deficit.   Skin: Skin is warm and dry. Capillary refill takes less than 2 seconds. There is erythema.   Small  abrasion on the medial side right 3rd digit   Psychiatric: He has a normal mood and affect. His behavior is normal. Judgment and thought content normal.         CRANIAL NERVES     CN III, IV, VI   Pupils are equal, round, and reactive to light.       Significant Labs:   BMP:   Recent Labs  Lab 04/16/18 1952   GLU 98   *   K 4.6      CO2 25   BUN 27*   CREATININE 1.0   CALCIUM 9.4     CBC:   Recent Labs  Lab 04/16/18 1952   WBC 10.90   HGB 12.4*   HCT 37.2*          Significant Imaging:     Right Hand Xray: Reviewed radiologist's report-- Impression   Soft tissue swelling right middle/long finger at the proximal interphalangeal joint.  Findings suggesting small foreign body in the distal aspect of the right long/middle finger anterior to the distal phalanx, seen on one view only with correlation recommended clinically.  Extensive changes at the wrist appearing similar although progressive compared to prior study of 12/20 7/2014.         Assessment/Plan:     * Foreign body of right middle finger with infection    IV Clindamycin  Consult orthopedics  NPO after midnight for possible I&D  Pain management          Essential hypertension    Chronic/Controlled. Continue chronic medications, adjusting as needed.            VTE Risk Mitigation         Ordered     IP VTE LOW RISK PATIENT  Once      04/16/18 2144             Sara Rucker NP  Department of Hospital Medicine   [facility]

## 2018-04-18 PROCEDURE — 12000002 HC ACUTE/MED SURGE SEMI-PRIVATE ROOM

## 2018-04-18 PROCEDURE — 94761 N-INVAS EAR/PLS OXIMETRY MLT: CPT

## 2018-04-18 PROCEDURE — 25000003 PHARM REV CODE 250: Performed by: NURSE PRACTITIONER

## 2018-04-18 PROCEDURE — S0077 INJECTION, CLINDAMYCIN PHOSP: HCPCS | Performed by: NURSE PRACTITIONER

## 2018-04-18 PROCEDURE — 99232 SBSQ HOSP IP/OBS MODERATE 35: CPT | Mod: ,,, | Performed by: ORTHOPAEDIC SURGERY

## 2018-04-18 RX ORDER — ACETAMINOPHEN 500 MG
1000 TABLET ORAL EVERY 6 HOURS PRN
Status: DISCONTINUED | OUTPATIENT
Start: 2018-04-18 | End: 2018-04-20 | Stop reason: HOSPADM

## 2018-04-18 RX ORDER — LISINOPRIL 2.5 MG/1
2.5 TABLET ORAL DAILY
Status: DISCONTINUED | OUTPATIENT
Start: 2018-04-19 | End: 2018-04-20 | Stop reason: HOSPADM

## 2018-04-18 RX ADMIN — CLINDAMYCIN IN 5 PERCENT DEXTROSE 900 MG: 18 INJECTION, SOLUTION INTRAVENOUS at 08:04

## 2018-04-18 RX ADMIN — OXYCODONE HYDROCHLORIDE 5 MG: 5 TABLET ORAL at 02:04

## 2018-04-18 RX ADMIN — CLINDAMYCIN IN 5 PERCENT DEXTROSE 900 MG: 18 INJECTION, SOLUTION INTRAVENOUS at 02:04

## 2018-04-18 RX ADMIN — OXYCODONE HYDROCHLORIDE 5 MG: 5 TABLET ORAL at 01:04

## 2018-04-18 RX ADMIN — HYDROCODONE BITARTRATE AND ACETAMINOPHEN 1 TABLET: 5; 325 TABLET ORAL at 10:04

## 2018-04-18 RX ADMIN — OXYCODONE HYDROCHLORIDE 5 MG: 5 TABLET ORAL at 07:04

## 2018-04-18 RX ADMIN — HYDROCODONE BITARTRATE AND ACETAMINOPHEN 1 TABLET: 5; 325 TABLET ORAL at 03:04

## 2018-04-18 RX ADMIN — HYDROCODONE BITARTRATE AND ACETAMINOPHEN 1 TABLET: 5; 325 TABLET ORAL at 05:04

## 2018-04-18 NOTE — PROGRESS NOTES
04/18/18 0845   Patient Assessment/Suction   Level of Consciousness (AVPU) alert   PRE-TX-O2-ETCO2   O2 Device (Oxygen Therapy) room air   SpO2 97 %   Pulse Oximetry Type Intermittent   $ Pulse Oximetry - Multiple Charge Pulse Oximetry - Multiple

## 2018-04-18 NOTE — PLAN OF CARE
Problem: Patient Care Overview  Goal: Plan of Care Review  Outcome: Ongoing (interventions implemented as appropriate)  Plan of care reviewed, patient verbalized understanding. AAOx4. IV abx infusing per order. VS addressed. Urinal at bedside, voiding freely. No BM noted, but passing flatulence frequent. Right hand elevated on pillows per orders. Remain afebrile throughout shift. Pain controlled with PRN meds. Bed in lowest position, side rails x2, call light within reach. Patient remains free from fall and injury. Will continue to monitor.

## 2018-04-18 NOTE — PLAN OF CARE
Problem: Patient Care Overview  Goal: Plan of Care Review  Outcome: Ongoing (interventions implemented as appropriate)  Pt had an uneventful night free from fall or injury.  Pt slept well awakening for pain medication intermittently.  A PIV remains in place with IV abx infusing as ordered.  Pt is tolerating a diet with no nausea. Pt's BP was stable in the low 100's overnight; voiding freely per urinal.  No BM over night but pt passing flatus.  Pt was updated on POC and given the opportunity to ask questions.  Call bell and bed side table were placed within reach and pt was informed that the nurse would be rounding hourly to ensure pt safety.

## 2018-04-19 ENCOUNTER — ANESTHESIA EVENT (OUTPATIENT)
Dept: SURGERY | Facility: HOSPITAL | Age: 78
DRG: 581 | End: 2018-04-19
Payer: OTHER GOVERNMENT

## 2018-04-19 ENCOUNTER — ANESTHESIA (OUTPATIENT)
Dept: SURGERY | Facility: HOSPITAL | Age: 78
DRG: 581 | End: 2018-04-19
Payer: OTHER GOVERNMENT

## 2018-04-19 ENCOUNTER — SURGERY (OUTPATIENT)
Age: 78
End: 2018-04-19

## 2018-04-19 PROCEDURE — 94761 N-INVAS EAR/PLS OXIMETRY MLT: CPT

## 2018-04-19 PROCEDURE — 63600175 PHARM REV CODE 636 W HCPCS: Performed by: NURSE ANESTHETIST, CERTIFIED REGISTERED

## 2018-04-19 PROCEDURE — 63600175 PHARM REV CODE 636 W HCPCS: Performed by: NURSE PRACTITIONER

## 2018-04-19 PROCEDURE — 87070 CULTURE OTHR SPECIMN AEROBIC: CPT

## 2018-04-19 PROCEDURE — D9220A PRA ANESTHESIA: Mod: ,,, | Performed by: ANESTHESIOLOGY

## 2018-04-19 PROCEDURE — 36000704 HC OR TIME LEV I 1ST 15 MIN: Performed by: ORTHOPAEDIC SURGERY

## 2018-04-19 PROCEDURE — 25000003 PHARM REV CODE 250: Performed by: NURSE PRACTITIONER

## 2018-04-19 PROCEDURE — 12000002 HC ACUTE/MED SURGE SEMI-PRIVATE ROOM

## 2018-04-19 PROCEDURE — S0077 INJECTION, CLINDAMYCIN PHOSP: HCPCS | Performed by: NURSE PRACTITIONER

## 2018-04-19 PROCEDURE — 71000033 HC RECOVERY, INTIAL HOUR: Performed by: ORTHOPAEDIC SURGERY

## 2018-04-19 PROCEDURE — 63600175 PHARM REV CODE 636 W HCPCS: Performed by: ANESTHESIOLOGY

## 2018-04-19 PROCEDURE — 37000009 HC ANESTHESIA EA ADD 15 MINS: Performed by: ORTHOPAEDIC SURGERY

## 2018-04-19 PROCEDURE — 37000008 HC ANESTHESIA 1ST 15 MINUTES: Performed by: ORTHOPAEDIC SURGERY

## 2018-04-19 PROCEDURE — 25000003 PHARM REV CODE 250: Performed by: ANESTHESIOLOGY

## 2018-04-19 PROCEDURE — 36000705 HC OR TIME LEV I EA ADD 15 MIN: Performed by: ORTHOPAEDIC SURGERY

## 2018-04-19 PROCEDURE — 27200651 HC AIRWAY, LMA: Performed by: NURSE ANESTHETIST, CERTIFIED REGISTERED

## 2018-04-19 PROCEDURE — 87075 CULTR BACTERIA EXCEPT BLOOD: CPT

## 2018-04-19 PROCEDURE — 71000039 HC RECOVERY, EACH ADD'L HOUR: Performed by: ORTHOPAEDIC SURGERY

## 2018-04-19 PROCEDURE — 87186 SC STD MICRODIL/AGAR DIL: CPT

## 2018-04-19 PROCEDURE — 87077 CULTURE AEROBIC IDENTIFY: CPT

## 2018-04-19 PROCEDURE — 26010 DRAINAGE OF FINGER ABSCESS: CPT | Mod: RT,,, | Performed by: ORTHOPAEDIC SURGERY

## 2018-04-19 PROCEDURE — 99900103 DSU ONLY-NO CHARGE-INITIAL HR (STAT): Performed by: ORTHOPAEDIC SURGERY

## 2018-04-19 PROCEDURE — 0J9J0ZZ DRAINAGE OF RIGHT HAND SUBCUTANEOUS TISSUE AND FASCIA, OPEN APPROACH: ICD-10-PCS | Performed by: ORTHOPAEDIC SURGERY

## 2018-04-19 PROCEDURE — 99900104 DSU ONLY-NO CHARGE-EA ADD'L HR (STAT): Performed by: ORTHOPAEDIC SURGERY

## 2018-04-19 RX ORDER — HYDROCODONE BITARTRATE AND ACETAMINOPHEN 5; 325 MG/1; MG/1
1 TABLET ORAL EVERY 4 HOURS PRN
Status: DISCONTINUED | OUTPATIENT
Start: 2018-04-19 | End: 2018-04-20 | Stop reason: HOSPADM

## 2018-04-19 RX ORDER — FENTANYL CITRATE 50 UG/ML
25 INJECTION, SOLUTION INTRAMUSCULAR; INTRAVENOUS EVERY 5 MIN PRN
Status: DISCONTINUED | OUTPATIENT
Start: 2018-04-19 | End: 2018-04-19 | Stop reason: SDUPTHER

## 2018-04-19 RX ORDER — PROPOFOL 10 MG/ML
VIAL (ML) INTRAVENOUS
Status: DISCONTINUED | OUTPATIENT
Start: 2018-04-19 | End: 2018-04-19

## 2018-04-19 RX ORDER — ACETAMINOPHEN 10 MG/ML
INJECTION, SOLUTION INTRAVENOUS
Status: DISCONTINUED | OUTPATIENT
Start: 2018-04-19 | End: 2018-04-19

## 2018-04-19 RX ORDER — SODIUM CHLORIDE 0.9 % (FLUSH) 0.9 %
5 SYRINGE (ML) INJECTION
Status: DISCONTINUED | OUTPATIENT
Start: 2018-04-19 | End: 2018-04-20 | Stop reason: HOSPADM

## 2018-04-19 RX ORDER — OXYCODONE HYDROCHLORIDE 5 MG/1
5 TABLET ORAL ONCE AS NEEDED
Status: COMPLETED | OUTPATIENT
Start: 2018-04-19 | End: 2018-04-19

## 2018-04-19 RX ORDER — DEXAMETHASONE SODIUM PHOSPHATE 4 MG/ML
INJECTION, SOLUTION INTRA-ARTICULAR; INTRALESIONAL; INTRAMUSCULAR; INTRAVENOUS; SOFT TISSUE
Status: DISCONTINUED | OUTPATIENT
Start: 2018-04-19 | End: 2018-04-19

## 2018-04-19 RX ORDER — FENTANYL CITRATE 50 UG/ML
INJECTION, SOLUTION INTRAMUSCULAR; INTRAVENOUS
Status: DISCONTINUED | OUTPATIENT
Start: 2018-04-19 | End: 2018-04-19

## 2018-04-19 RX ORDER — MORPHINE SULFATE 2 MG/ML
2 INJECTION, SOLUTION INTRAMUSCULAR; INTRAVENOUS EVERY 4 HOURS PRN
Status: DISCONTINUED | OUTPATIENT
Start: 2018-04-19 | End: 2018-04-20 | Stop reason: HOSPADM

## 2018-04-19 RX ORDER — LIDOCAINE HCL/PF 100 MG/5ML
SYRINGE (ML) INTRAVENOUS
Status: DISCONTINUED | OUTPATIENT
Start: 2018-04-19 | End: 2018-04-19

## 2018-04-19 RX ORDER — ONDANSETRON 2 MG/ML
4 INJECTION INTRAMUSCULAR; INTRAVENOUS ONCE AS NEEDED
Status: DISCONTINUED | OUTPATIENT
Start: 2018-04-19 | End: 2018-04-19

## 2018-04-19 RX ORDER — ONDANSETRON HYDROCHLORIDE 2 MG/ML
INJECTION, SOLUTION INTRAMUSCULAR; INTRAVENOUS
Status: DISCONTINUED | OUTPATIENT
Start: 2018-04-19 | End: 2018-04-19

## 2018-04-19 RX ORDER — OXYCODONE HYDROCHLORIDE 5 MG/1
5 TABLET ORAL ONCE AS NEEDED
Status: DISCONTINUED | OUTPATIENT
Start: 2018-04-19 | End: 2018-04-19 | Stop reason: SDUPTHER

## 2018-04-19 RX ORDER — ONDANSETRON 2 MG/ML
4 INJECTION INTRAMUSCULAR; INTRAVENOUS ONCE AS NEEDED
Status: DISCONTINUED | OUTPATIENT
Start: 2018-04-19 | End: 2018-04-19 | Stop reason: SDUPTHER

## 2018-04-19 RX ORDER — FENTANYL CITRATE 50 UG/ML
25 INJECTION, SOLUTION INTRAMUSCULAR; INTRAVENOUS EVERY 5 MIN PRN
Status: DISCONTINUED | OUTPATIENT
Start: 2018-04-19 | End: 2018-04-19

## 2018-04-19 RX ORDER — MIDAZOLAM HYDROCHLORIDE 1 MG/ML
INJECTION INTRAMUSCULAR; INTRAVENOUS
Status: DISCONTINUED | OUTPATIENT
Start: 2018-04-19 | End: 2018-04-19

## 2018-04-19 RX ADMIN — FENTANYL CITRATE 50 MCG: 50 INJECTION, SOLUTION INTRAMUSCULAR; INTRAVENOUS at 05:04

## 2018-04-19 RX ADMIN — PROPOFOL 150 MG: 10 INJECTION, EMULSION INTRAVENOUS at 05:04

## 2018-04-19 RX ADMIN — FENTANYL CITRATE 25 MCG: 50 INJECTION, SOLUTION INTRAMUSCULAR; INTRAVENOUS at 06:04

## 2018-04-19 RX ADMIN — MIDAZOLAM HYDROCHLORIDE 2 MG: 1 INJECTION, SOLUTION INTRAMUSCULAR; INTRAVENOUS at 05:04

## 2018-04-19 RX ADMIN — LIDOCAINE HYDROCHLORIDE 100 MG: 20 INJECTION, SOLUTION INTRAVENOUS at 05:04

## 2018-04-19 RX ADMIN — Medication 2 MG: at 07:04

## 2018-04-19 RX ADMIN — DEXAMETHASONE SODIUM PHOSPHATE 4 MG: 4 INJECTION, SOLUTION INTRAMUSCULAR; INTRAVENOUS at 05:04

## 2018-04-19 RX ADMIN — OXYCODONE HYDROCHLORIDE 5 MG: 5 TABLET ORAL at 06:04

## 2018-04-19 RX ADMIN — ACETAMINOPHEN 1000 MG: 10 INJECTION, SOLUTION INTRAVENOUS at 05:04

## 2018-04-19 RX ADMIN — SODIUM CHLORIDE, SODIUM GLUCONATE, SODIUM ACETATE, POTASSIUM CHLORIDE, MAGNESIUM CHLORIDE, SODIUM PHOSPHATE, DIBASIC, AND POTASSIUM PHOSPHATE: .53; .5; .37; .037; .03; .012; .00082 INJECTION, SOLUTION INTRAVENOUS at 03:04

## 2018-04-19 RX ADMIN — CLINDAMYCIN IN 5 PERCENT DEXTROSE 900 MG: 18 INJECTION, SOLUTION INTRAVENOUS at 02:04

## 2018-04-19 RX ADMIN — HYDROCODONE BITARTRATE AND ACETAMINOPHEN 1 TABLET: 5; 325 TABLET ORAL at 12:04

## 2018-04-19 RX ADMIN — Medication 2 MG: at 11:04

## 2018-04-19 RX ADMIN — CLINDAMYCIN IN 5 PERCENT DEXTROSE 900 MG: 18 INJECTION, SOLUTION INTRAVENOUS at 08:04

## 2018-04-19 RX ADMIN — ONDANSETRON 4 MG: 2 INJECTION, SOLUTION INTRAMUSCULAR; INTRAVENOUS at 05:04

## 2018-04-19 RX ADMIN — SODIUM CHLORIDE, SODIUM GLUCONATE, SODIUM ACETATE, POTASSIUM CHLORIDE, MAGNESIUM CHLORIDE, SODIUM PHOSPHATE, DIBASIC, AND POTASSIUM PHOSPHATE: .53; .5; .37; .037; .03; .012; .00082 INJECTION, SOLUTION INTRAVENOUS at 05:04

## 2018-04-19 RX ADMIN — CLINDAMYCIN IN 5 PERCENT DEXTROSE 900 MG: 18 INJECTION, SOLUTION INTRAVENOUS at 09:04

## 2018-04-19 NOTE — PROGRESS NOTES
[facility] Medicine  Progress Note    Patient Name: Larry Lepley  MRN: [id number]  Patient Class: IP- Inpatient   Admission Date: 4/16/2018  Length of Stay: 2 days  Attending Physician: Davi Jarvis MD  Primary Care Provider: [facility]        Subjective:     Principal Problem:Foreign body of right middle finger with infection    HPI:  Mr. Lepley is a 76yo M with a PMH of HTN. He had presented to [facility] with c/o finger pain and swelling. He cut his 3rd digit to his right hand on glass 4 days ago while trying to separate his dogs from fighting. He did notice glass in his finger. His finger and hand Has had worseining symptoms of redness, pain, and swelling. While at [facility], he had an Xray which showed a foreign object to the right 3rd finger. He [place] [facility] [place]. While in the ED here, Dr. Tripp was notified of the patient case. He recommends kepping patient NPO after midnight for possible I&D and he will see him in the morning. IV Clindamycin was initiated. He currently denies pain or discomforts.          Hospital Course:  No notes on file    Interval History: Patient seen and examined.  No acute events overnight.  Blood pressure tends to be on the lower side the patient has been stacking his pain medications every 3 hours instead of every 6 as it was intended.  Patient's case discussed with orthopedics.    Review of Systems   Constitutional: Negative for chills, fatigue and fever.   Respiratory: Negative for cough and shortness of breath.    Cardiovascular: Negative for chest pain and leg swelling.   Gastrointestinal: Negative for abdominal pain, nausea and vomiting.   Musculoskeletal: Positive for joint swelling. Negative for back pain.   Neurological: Negative for weakness.   Psychiatric/Behavioral: Negative for confusion. The patient is not nervous/anxious.      Objective:     Vital Signs (Most Recent):  Temp: 98.3 °F  (36.8 °C) (04/18/18 2028)  Pulse: 85 (04/18/18 2028)  Resp: 16 (04/18/18 2028)  BP: 113/61 (04/18/18 2028)  SpO2: 95 % (04/18/18 2028) Vital Signs (24h Range):  Temp:  [97.2 °F (36.2 °C)-98.3 °F (36.8 °C)] 98.3 °F (36.8 °C)  Pulse:  [69-92] 85  Resp:  [16-18] 16  SpO2:  [92 %-98 %] 95 %  BP: ()/(51-65) 113/61     Weight: 80.3 kg (177 lb 0.5 oz)  Body mass index is 27.73 kg/m².    Intake/Output Summary (Last 24 hours) at 04/18/18 2203  Last data filed at 04/18/18 1753   Gross per 24 hour   Intake             1121 ml   Output              750 ml   Net              371 ml      Physical Exam   Constitutional: He appears well-developed and well-nourished.   Eyes: EOM are normal. Pupils are equal, round, and reactive to light.   Neck: No JVD present.   Cardiovascular: Normal rate, regular rhythm, normal heart sounds and intact distal pulses.    Pulmonary/Chest: Effort normal and breath sounds normal.   Abdominal: Soft. Bowel sounds are normal. He exhibits no distension. There is no tenderness.   Musculoskeletal: He exhibits deformity. He exhibits no edema.   Noted erythema and swelling on R hand 3rd digit.   Lymphadenopathy:     He has no cervical adenopathy.   Skin: No rash noted. No pallor.   Nursing note and vitals reviewed.      Significant Labs: All pertinent labs within the past 24 hours have been reviewed.    Significant Imaging: I have reviewed all pertinent imaging results/findings within the past 24 hours.    Assessment/Plan:      * Foreign body of right middle finger with infection    patient continues on clindamycin.  Likely this will not heal with antibiotics alone.  Will recommend follow-up with orthopedic surgery for drainage and removal foreign body.          Essential hypertension    BP Low. Hold BP meds presently and monitor.          VTE Risk Mitigation         Ordered     IP VTE LOW RISK PATIENT  Once      04/16/18 3595              Davi Jarvis MD  Department of Hospital Medicine   [facility]

## 2018-04-19 NOTE — BRIEF OP NOTE
[facility]  Brief Operative Note    SUMMARY     Surgery Date: 4/19/2018     Surgeon(s) and Role:     * Stan Arroyo MD - Primary    Assisting Surgeon: Silvestre Garzon    Pre-op Diagnosis:  Cellulitis of right index finger [L03.011]  Foreign body of right middle finger with infection [S60.452A, L08.9]    Post-op Diagnosis:  Post-Op Diagnosis Codes:     * Cellulitis of right index finger [L03.011]     * Foreign body of right middle finger with infection [S60.452A, L08.9]    Procedure(s) (LRB):  INCISION AND DRAINAGE (I & D)-HAND (Right)    Anesthesia: General    Description of Procedure: small amount of purulence    Estimated Blood Loss: 1 mL         Specimens:   Specimen (12h ago through future)    None

## 2018-04-19 NOTE — ASSESSMENT & PLAN NOTE
No foreign body seen on Ultrasound.  Continue IV antibiotics  Ok to eat today, will I&D ankita if not improved alot

## 2018-04-19 NOTE — PLAN OF CARE
Problem: Patient Care Overview  Goal: Plan of Care Review  Outcome: Revised  Pt is AAOx4.  Pt c/o pain, prn medication given, pt tolerated well.  IV saline locked, infusing abx as ordered, no redness or swelling at site.  Pt is up ad dipak, no falls this shift.  VSS, in NAD, pt remains afebrile.  Pt remains free from injury.  Bed in low position, wheels locked, call light within reach.  Pt verbalized understanding of POC.  Will continue to monitor.

## 2018-04-19 NOTE — SUBJECTIVE & OBJECTIVE
Interval History: Patient seen and examined.  No acute events overnight.  Blood pressure tends to be on the lower side the patient has been stacking his pain medications every 3 hours instead of every 6 as it was intended.  Patient's case discussed with orthopedics.    Review of Systems   Constitutional: Negative for chills, fatigue and fever.   Respiratory: Negative for cough and shortness of breath.    Cardiovascular: Negative for chest pain and leg swelling.   Gastrointestinal: Negative for abdominal pain, nausea and vomiting.   Musculoskeletal: Positive for joint swelling. Negative for back pain.   Neurological: Negative for weakness.   Psychiatric/Behavioral: Negative for confusion. The patient is not nervous/anxious.      Objective:     Vital Signs (Most Recent):  Temp: 98.3 °F (36.8 °C) (04/18/18 2028)  Pulse: 85 (04/18/18 2028)  Resp: 16 (04/18/18 2028)  BP: 113/61 (04/18/18 2028)  SpO2: 95 % (04/18/18 2028) Vital Signs (24h Range):  Temp:  [97.2 °F (36.2 °C)-98.3 °F (36.8 °C)] 98.3 °F (36.8 °C)  Pulse:  [69-92] 85  Resp:  [16-18] 16  SpO2:  [92 %-98 %] 95 %  BP: ()/(51-65) 113/61     Weight: 80.3 kg (177 lb 0.5 oz)  Body mass index is 27.73 kg/m².    Intake/Output Summary (Last 24 hours) at 04/18/18 2203  Last data filed at 04/18/18 1753   Gross per 24 hour   Intake             1121 ml   Output              750 ml   Net              371 ml      Physical Exam   Constitutional: He appears well-developed and well-nourished.   Eyes: EOM are normal. Pupils are equal, round, and reactive to light.   Neck: No JVD present.   Cardiovascular: Normal rate, regular rhythm, normal heart sounds and intact distal pulses.    Pulmonary/Chest: Effort normal and breath sounds normal.   Abdominal: Soft. Bowel sounds are normal. He exhibits no distension. There is no tenderness.   Musculoskeletal: He exhibits deformity. He exhibits no edema.   Noted erythema and swelling on R hand 3rd digit.   Lymphadenopathy:     He has  no cervical adenopathy.   Skin: No rash noted. No pallor.   Nursing note and vitals reviewed.      Significant Labs: All pertinent labs within the past 24 hours have been reviewed.    Significant Imaging: I have reviewed all pertinent imaging results/findings within the past 24 hours.

## 2018-04-19 NOTE — ANESTHESIA PREPROCEDURE EVALUATION
04/19/2018  Larry Lepley is a 77 y.o., male.    Anesthesia Evaluation    I have reviewed the Patient Summary Reports.    I have reviewed the Nursing Notes.      Review of Systems  Anesthesia Hx:  No problems with previous Anesthesia    Cardiovascular:   Hypertension, well controlled        Physical Exam  General:  Well nourished                 Anesthesia Plan  Type of Anesthesia, risks & benefits discussed:  Anesthesia Type:  general  Patient's Preference:   Intra-op Monitoring Plan:   Intra-op Monitoring Plan Comments:   Post Op Pain Control Plan:   Post Op Pain Control Plan Comments:   Induction:   IV  Beta Blocker:  Patient is not currently on a Beta-Blocker (No further documentation required).       Informed Consent: Patient understands risks and agrees with Anesthesia plan.  Questions answered. Anesthesia consent signed with patient.  ASA Score: 2     Day of Surgery Review of History & Physical:    H&P update referred to the surgeon.         Ready For Surgery From Anesthesia Perspective.

## 2018-04-19 NOTE — SUBJECTIVE & OBJECTIVE
"Principal Problem:Foreign body of right middle finger with infection      Interval History: Finger is better and less swollen but has stop[ped draining and still has large swelling near the PIP    Review of patient's allergies indicates:   Allergen Reactions    Aspirin (bulk) Swelling    Sulfa (sulfonamide antibiotics)        Current Facility-Administered Medications   Medication    acetaminophen tablet 1,000 mg    clindamycin 900 MG/50 ML D5W 900 mg/50 mL IVPB 900 mg    hydrocodone-acetaminophen 5-325mg per tablet 1 tablet    [START ON 4/19/2018] lisinopril tablet 2.5 mg    ondansetron injection 4 mg    senna-docusate 8.6-50 mg per tablet 1 tablet     Objective:     Vital Signs (Most Recent):  Temp: 97.6 °F (36.4 °C) (04/18/18 1534)  Pulse: 81 (04/18/18 1743)  Resp: 18 (04/18/18 1534)  BP: 114/65 (04/18/18 1743)  SpO2: (!) 94 % (04/18/18 1534) Vital Signs (24h Range):  Temp:  [97.2 °F (36.2 °C)-98.3 °F (36.8 °C)] 97.6 °F (36.4 °C)  Pulse:  [69-96] 81  Resp:  [16-19] 18  SpO2:  [92 %-98 %] 94 %  BP: ()/(51-65) 114/65     Weight: 80.3 kg (177 lb 0.5 oz)  Height: 5' 7" (170.2 cm)  Body mass index is 27.73 kg/m².      Intake/Output Summary (Last 24 hours) at 04/18/18 1900  Last data filed at 04/18/18 1753   Gross per 24 hour   Intake             1121 ml   Output              750 ml   Net              371 ml       General    Nursing note and vitals reviewed.  Constitutional: He is oriented to person, place, and time. He appears well-developed and well-nourished. No distress.   HENT:   Nose: Nose normal.   Eyes: EOM are normal.   Cardiovascular: Regular rhythm.    Pulmonary/Chest: Effort normal.   Abdominal: Soft.   Neurological: He is alert and oriented to person, place, and time.   Psychiatric: His behavior is normal.             Right Hand/Wrist Exam     Inspection   Deformity: Hand -  deformity    Pain   Hand - The patient exhibits pain of the middle IP.    Swelling   Hand - The patient is swollen on " the middle IP.    Other     Neuorologic Exam    Median Distribution: normal  Ulnar Distribution: normal  Radial Distribution: normal      Left Hand/Wrist Exam   Left hand exam is normal.          Vascular Exam       Capillary Refill  Right Hand: normal capillary refill      Significant Labs:   BMP:   Recent Labs  Lab 04/16/18 1952   GLU 98   *   K 4.6      CO2 25   BUN 27*   CREATININE 1.0   CALCIUM 9.4     CBC:   Recent Labs  Lab 04/16/18 1952   WBC 10.90   HGB 12.4*   HCT 37.2*        CRP: No results for input(s): CRP in the last 48 hours.  All pertinent labs within the past 24 hours have been reviewed.    Significant Imaging: X-Ray: I have reviewed all pertinent results/findings and my personal findings are:  Possible foreign body but not in swollen area

## 2018-04-19 NOTE — TRANSFER OF CARE
"Anesthesia Transfer of Care Note    Patient: Larry Lepley    Procedure(s) Performed: Procedure(s) (LRB):  INCISION AND DRAINAGE (I & D)-HAND (Right)    Patient location: PACU    Anesthesia Type: general    Transport from OR: Transported from OR on 2-3 L/min O2 by NC with adequate spontaneous ventilation    Post pain: adequate analgesia    Post assessment: no apparent anesthetic complications and tolerated procedure well    Post vital signs: stable    Level of consciousness: awake, alert and oriented    Nausea/Vomiting: no nausea/vomiting    Complications: none    Transfer of care protocol was followed      Last vitals:   Visit Vitals  /85 (BP Location: Left arm, Patient Position: Sitting)   Pulse 74   Temp 36.6 °C (97.9 °F) (Temporal)   Resp (!) 74   Ht 5' 7" (1.702 m)   Wt 80.3 kg (177 lb 0.5 oz)   SpO2 95%   BMI 27.73 kg/m²     "

## 2018-04-19 NOTE — CHAPLAIN
The  offered emotional support and spiritual care to this patient anticipating surgery on his painful finger; he acknowledged and ok'd recognition of his  status and welcomed the 's ongoing prayers. The  will continue to care for patient and family.

## 2018-04-19 NOTE — ASSESSMENT & PLAN NOTE
patient continues on clindamycin.  Likely this will not heal with antibiotics alone.  Will recommend follow-up with orthopedic surgery for drainage and removal foreign body.

## 2018-04-19 NOTE — PROGRESS NOTES
"[facility]  Orthopedics  Progress Note    Patient Name: Larry Lepley  MRN: [id number]  Admission Date: 4/16/2018  Hospital Length of Stay: 2 days  Attending Provider: Davi Jarvis MD  Primary Care Provider: [facility] [place]    Subjective:     Principal Problem:Foreign body of right middle finger with infection      Interval History: Finger is better and less swollen but has stop[ped draining and still has large swelling near the PIP    Review of patient's allergies indicates:   Allergen Reactions    Aspirin (bulk) Swelling    Sulfa (sulfonamide antibiotics)        Current Facility-Administered Medications   Medication    acetaminophen tablet 1,000 mg    clindamycin 900 MG/50 ML D5W 900 mg/50 mL IVPB 900 mg    hydrocodone-acetaminophen 5-325mg per tablet 1 tablet    [START ON 4/19/2018] lisinopril tablet 2.5 mg    ondansetron injection 4 mg    senna-docusate 8.6-50 mg per tablet 1 tablet     Objective:     Vital Signs (Most Recent):  Temp: 97.6 °F (36.4 °C) (04/18/18 1534)  Pulse: 81 (04/18/18 1743)  Resp: 18 (04/18/18 1534)  BP: 114/65 (04/18/18 1743)  SpO2: (!) 94 % (04/18/18 1534) Vital Signs (24h Range):  Temp:  [97.2 °F (36.2 °C)-98.3 °F (36.8 °C)] 97.6 °F (36.4 °C)  Pulse:  [69-96] 81  Resp:  [16-19] 18  SpO2:  [92 %-98 %] 94 %  BP: ()/(51-65) 114/65     Weight: 80.3 kg (177 lb 0.5 oz)  Height: 5' 7" (170.2 cm)  Body mass index is 27.73 kg/m².      Intake/Output Summary (Last 24 hours) at 04/18/18 1900  Last data filed at 04/18/18 1753   Gross per 24 hour   Intake             1121 ml   Output              750 ml   Net              371 ml       General    Nursing note and vitals reviewed.  Constitutional: He is oriented to person, place, and time. He appears well-developed and well-nourished. No distress.   HENT:   Nose: Nose normal.   Eyes: EOM are normal.   Cardiovascular: Regular rhythm.    Pulmonary/Chest: Effort normal.   Abdominal: Soft. "   Neurological: He is alert and oriented to person, place, and time.   Psychiatric: His behavior is normal.             Right Hand/Wrist Exam     Inspection   Deformity: Hand -  deformity    Pain   Hand - The patient exhibits pain of the middle IP.    Swelling   Hand - The patient is swollen on the middle IP.    Other     Neuorologic Exam    Median Distribution: normal  Ulnar Distribution: normal  Radial Distribution: normal      Left Hand/Wrist Exam   Left hand exam is normal.          Vascular Exam       Capillary Refill  Right Hand: normal capillary refill      Significant Labs:   BMP:   Recent Labs  Lab 04/16/18 1952   GLU 98   *   K 4.6      CO2 25   BUN 27*   CREATININE 1.0   CALCIUM 9.4     CBC:   Recent Labs  Lab 04/16/18 1952   WBC 10.90   HGB 12.4*   HCT 37.2*        CRP: No results for input(s): CRP in the last 48 hours.  All pertinent labs within the past 24 hours have been reviewed.    Significant Imaging: X-Ray: I have reviewed all pertinent results/findings and my personal findings are:  Possible foreign body but not in swollen area    Assessment/Plan:     * Foreign body of right middle finger with infection    No foreign body seen on Ultrasound.  Continue IV antibiotics  Ok to eat today, will I&D ankita if not improved romeo Arroyo MD  Orthopedics  [facility]

## 2018-04-19 NOTE — ANESTHESIA POSTPROCEDURE EVALUATION
"Anesthesia Post Evaluation    Patient: Larry Lepley    Procedure(s) Performed: Procedure(s) (LRB):  INCISION AND DRAINAGE (I & D)-HAND (Right)    Final Anesthesia Type: general  Patient location during evaluation: PACU  Patient participation: Yes- Able to Participate  Level of consciousness: awake and alert  Post-procedure vital signs: reviewed and stable  Pain management: adequate  Airway patency: patent  PONV status at discharge: No PONV  Anesthetic complications: no      Cardiovascular status: blood pressure returned to baseline and hemodynamically stable  Respiratory status: unassisted  Hydration status: euvolemic  Follow-up not needed.        Visit Vitals  /85 (BP Location: Left arm, Patient Position: Sitting)   Pulse 74   Temp 36.6 °C (97.9 °F) (Temporal)   Resp (!) 74   Ht 5' 7" (1.702 m)   Wt 80.3 kg (177 lb 0.5 oz)   SpO2 95%   BMI 27.73 kg/m²       Pain/Haritha Score: Pain Assessment Performed: Yes (4/19/2018  6:15 PM)  Presence of Pain: non-verbal indicators absent (4/19/2018  6:15 PM)  Pain Rating Prior to Med Admin: 9 (4/19/2018 11:16 AM)  Pain Rating Post Med Admin: 7 (4/19/2018 11:46 AM)  Haritha Score: 4 (4/19/2018  6:15 PM)      "

## 2018-04-20 VITALS
SYSTOLIC BLOOD PRESSURE: 125 MMHG | WEIGHT: 177 LBS | HEIGHT: 67 IN | BODY MASS INDEX: 27.78 KG/M2 | HEART RATE: 79 BPM | RESPIRATION RATE: 18 BRPM | TEMPERATURE: 98 F | DIASTOLIC BLOOD PRESSURE: 78 MMHG | OXYGEN SATURATION: 97 %

## 2018-04-20 PROCEDURE — 94761 N-INVAS EAR/PLS OXIMETRY MLT: CPT

## 2018-04-20 PROCEDURE — S0077 INJECTION, CLINDAMYCIN PHOSP: HCPCS | Performed by: NURSE PRACTITIONER

## 2018-04-20 PROCEDURE — 25000003 PHARM REV CODE 250: Performed by: ORTHOPAEDIC SURGERY

## 2018-04-20 PROCEDURE — 25000003 PHARM REV CODE 250: Performed by: HOSPITALIST

## 2018-04-20 PROCEDURE — 25000003 PHARM REV CODE 250: Performed by: NURSE PRACTITIONER

## 2018-04-20 RX ORDER — ACETAMINOPHEN 500 MG
1000 TABLET ORAL EVERY 6 HOURS PRN
Refills: 0 | COMMUNITY
Start: 2018-04-20

## 2018-04-20 RX ORDER — HYDROCODONE BITARTRATE AND ACETAMINOPHEN 5; 325 MG/1; MG/1
1 TABLET ORAL EVERY 4 HOURS PRN
Qty: 20 TABLET | Refills: 0 | Status: ON HOLD | OUTPATIENT
Start: 2018-04-20 | End: 2022-09-09 | Stop reason: HOSPADM

## 2018-04-20 RX ORDER — CLINDAMYCIN HYDROCHLORIDE 300 MG/1
300 CAPSULE ORAL 3 TIMES DAILY
Qty: 30 CAPSULE | Refills: 0 | Status: SHIPPED | OUTPATIENT
Start: 2018-04-20 | End: 2018-04-30

## 2018-04-20 RX ADMIN — CLINDAMYCIN IN 5 PERCENT DEXTROSE 900 MG: 18 INJECTION, SOLUTION INTRAVENOUS at 09:04

## 2018-04-20 RX ADMIN — HYDROCODONE BITARTRATE AND ACETAMINOPHEN 1 TABLET: 5; 325 TABLET ORAL at 04:04

## 2018-04-20 RX ADMIN — HYDROCODONE BITARTRATE AND ACETAMINOPHEN 1 TABLET: 5; 325 TABLET ORAL at 10:04

## 2018-04-20 RX ADMIN — CLINDAMYCIN IN 5 PERCENT DEXTROSE 900 MG: 18 INJECTION, SOLUTION INTRAVENOUS at 03:04

## 2018-04-20 RX ADMIN — CLINDAMYCIN IN 5 PERCENT DEXTROSE 900 MG: 18 INJECTION, SOLUTION INTRAVENOUS at 02:04

## 2018-04-20 RX ADMIN — LISINOPRIL 2.5 MG: 2.5 TABLET ORAL at 09:04

## 2018-04-20 NOTE — ASSESSMENT & PLAN NOTE
OK for dc home on po antibiotics.  Changes dressing as needed with some antibiotic ointment  F/u in 2 weeks for suture removal

## 2018-04-20 NOTE — PLAN OF CARE
Problem: Patient Care Overview  Goal: Plan of Care Review  Outcome: Revised  Pt is AAOx4.  IVF infusing, abx given as ordered, no redness or swelling at site.  Pt is continent of B and B, up with stand-by assist.  Dressing to R hand monitored.  VSS, in NAD, pt remains afebrile.  Pt remains free from injury.  Bed in low position, wheels locked, call light within reach.  Pt verbalized understanding of POC.  Will continue to monitor.

## 2018-04-20 NOTE — ASSESSMENT & PLAN NOTE
Chronic, controlled.  Monitor BP closely.  Will continue BP medications as needed for sustained BP control. Lowered ACEi d/t low BP on 4/17.

## 2018-04-20 NOTE — NURSING
Pt states he is not able to pay for the clindamycin as the private cost is $80.20 from [facility] pharmacy. [facility]. Informed Dr. Jarvis regarding the same. New prescription for bactrim obtained from Dr. Jarvis and given to pt as this medication is on the $4 [facility] pharmacy list.

## 2018-04-20 NOTE — NURSING
Discharge instructions reviewed with pt. Pt verbalized understanding regarding discharge instructions. Pt awaiting a ride home at this time.

## 2018-04-20 NOTE — PLAN OF CARE
Problem: Patient Care Overview  Goal: Individualization & Mutuality  AAOx4. Plan of care reviewed with patient. Patient verbalized understanding. Patient positions independently. Activity as tolerated. NPO for I&D. TEDs and Scds applied per order. IV antibiotics infused per orders. PRN pain medication given for pain control with mild relief obtained. Patient off floor from ~1430-shift change~

## 2018-04-20 NOTE — PROGRESS NOTES
"[facility]  Orthopedics  Progress Note    Patient Name: Larry Lepley  MRN: [id number]  Admission Date: 4/16/2018  Hospital Length of Stay: 4 days  Attending Provider: Davi Jarvis MD  Primary Care Provider: [facility] [place]  Follow-up For: Procedure(s) (LRB):  INCISION AND DRAINAGE (I & D)-HAND (Right)    Post-Operative Day: 1 Day Post-Op  Subjective:     Principal Problem:Foreign body of right middle finger with infection      Interval History: Finger is better and less swollen. Dressing is clean    Review of patient's allergies indicates:   Allergen Reactions    Aspirin (bulk) Swelling    Sulfa (sulfonamide antibiotics)        Current Facility-Administered Medications   Medication    acetaminophen tablet 1,000 mg    clindamycin 900 MG/50 ML D5W 900 mg/50 mL IVPB 900 mg    hydrocodone-acetaminophen 5-325mg per tablet 1 tablet    lisinopril tablet 2.5 mg    morphine injection 2 mg    ondansetron injection 4 mg    senna-docusate 8.6-50 mg per tablet 1 tablet    sodium chloride 0.9% flush 5 mL     Objective:     Vital Signs (Most Recent):  Temp: 97 °F (36.1 °C) (04/20/18 0800)  Pulse: 65 (04/20/18 0800)  Resp: 20 (04/20/18 0800)  BP: 119/66 (04/20/18 0800)  SpO2: 98 % (04/20/18 0800) Vital Signs (24h Range):  Temp:  [96.4 °F (35.8 °C)-98.7 °F (37.1 °C)] 97 °F (36.1 °C)  Pulse:  [65-80] 65  Resp:  [8-74] 20  SpO2:  [94 %-100 %] 98 %  BP: (112-157)/(66-98) 119/66     Weight: 80.3 kg (177 lb 0.5 oz)  Height: 5' 7" (170.2 cm)  Body mass index is 27.73 kg/m².      Intake/Output Summary (Last 24 hours) at 04/20/18 0809  Last data filed at 04/20/18 0600   Gross per 24 hour   Intake             1180 ml   Output              951 ml   Net              229 ml       General    Nursing note and vitals reviewed.  Constitutional: He is oriented to person, place, and time. He appears well-developed and well-nourished. No distress.   HENT:   Nose: Nose normal.   Eyes: EOM are normal. "   Cardiovascular: Regular rhythm.    Pulmonary/Chest: Effort normal.   Abdominal: Soft.   Neurological: He is alert and oriented to person, place, and time.   Psychiatric: His behavior is normal.             Right Hand/Wrist Exam     Inspection   Deformity: Hand -  deformity    Pain   Hand - The patient exhibits pain of the middle IP.    Swelling   Hand - The patient is swollen on the middle IP.    Other     Neuorologic Exam    Median Distribution: normal  Ulnar Distribution: normal  Radial Distribution: normal      Left Hand/Wrist Exam   Left hand exam is normal.          Vascular Exam       Capillary Refill  Right Hand: normal capillary refill      Significant Labs:   BMP: No results for input(s): GLU, NA, K, CL, CO2, BUN, CREATININE, CALCIUM, MG in the last 48 hours.  CBC: No results for input(s): WBC, HGB, HCT, PLT in the last 48 hours.  CRP: No results for input(s): CRP in the last 48 hours.  All pertinent labs within the past 24 hours have been reviewed.    Significant Imaging: X-Ray: I have reviewed all pertinent results/findings and my personal findings are:  Possible foreign body but not in swollen area    Assessment/Plan:     * Foreign body of right middle finger with infection    OK for dc home on po antibiotics.  Changes dressing as needed with some antibiotic ointment  F/u in 2 weeks for suture removal              Stan Arroyo MD  Orthopedics  [facility]

## 2018-04-20 NOTE — PLAN OF CARE
04/20/18 1636   Final Note   Assessment Type Final Discharge Note   Discharge Disposition Home

## 2018-04-20 NOTE — ASSESSMENT & PLAN NOTE
patient with foreign body in place in the hand, discussed with orthopedic surgery will take patient to drainage of PIP joint and continue IV antibiotics for now.  Patient appears to be improving and doing well will hopefully be able to discharge home next 24-48 hours.

## 2018-04-20 NOTE — PLAN OF CARE
Problem: Patient Care Overview  Goal: Plan of Care Review  Outcome: Ongoing (interventions implemented as appropriate)  Pt on IV antibiotic therapy. Right hand elevated to help lessen swelling. Oral analgesics administered as needed for pain. Continue to monitor pt closely. Call light in easy reach.

## 2018-04-20 NOTE — SUBJECTIVE & OBJECTIVE
Interval History: Patient seen and examined.  Case discussed with orthopedic surgery.  Patient swelling appears to be slightly improved from yesterday however still having significant amount of pain requiring IV narcotics.    Review of Systems   Constitutional: Negative for chills, fatigue and fever.   Respiratory: Negative for cough and shortness of breath.    Cardiovascular: Negative for chest pain and leg swelling.   Gastrointestinal: Negative for abdominal pain, nausea and vomiting.   Musculoskeletal: Positive for joint swelling. Negative for back pain.   Neurological: Negative for weakness.   Psychiatric/Behavioral: Negative for confusion. The patient is not nervous/anxious.      Objective:     Vital Signs (Most Recent):  Temp: 97 °F (36.1 °C) (04/19/18 2054)  Pulse: 78 (04/19/18 2054)  Resp: 16 (04/19/18 2054)  BP: 118/74 (04/19/18 2054)  SpO2: (!) 94 % (04/19/18 2054) Vital Signs (24h Range):  Temp:  [97 °F (36.1 °C)-98.8 °F (37.1 °C)] 97 °F (36.1 °C)  Pulse:  [65-86] 78  Resp:  [8-74] 16  SpO2:  [93 %-100 %] 94 %  BP: (110-157)/(67-98) 118/74     Weight: 80.3 kg (177 lb 0.5 oz)  Body mass index is 27.73 kg/m².    Intake/Output Summary (Last 24 hours) at 04/19/18 2106  Last data filed at 04/19/18 1900   Gross per 24 hour   Intake              700 ml   Output              701 ml   Net               -1 ml      Physical Exam   Constitutional: He appears well-developed and well-nourished.   Eyes: EOM are normal. Pupils are equal, round, and reactive to light.   Neck: No JVD present.   Cardiovascular: Normal rate, regular rhythm, normal heart sounds and intact distal pulses.    Pulmonary/Chest: Effort normal and breath sounds normal.   Abdominal: Soft. Bowel sounds are normal. He exhibits no distension. There is no tenderness.   Musculoskeletal: He exhibits edema. He exhibits no deformity.   R 3rd digit with pain, erythema   Lymphadenopathy:     He has no cervical adenopathy.   Skin: No rash noted. No pallor.    Nursing note and vitals reviewed.      Significant Labs: All pertinent labs within the past 24 hours have been reviewed.    Significant Imaging: I have reviewed all pertinent imaging results/findings within the past 24 hours.

## 2018-04-20 NOTE — SUBJECTIVE & OBJECTIVE
"Principal Problem:Foreign body of right middle finger with infection      Interval History: Finger is better and less swollen. Dressing is clean    Review of patient's allergies indicates:   Allergen Reactions    Aspirin (bulk) Swelling    Sulfa (sulfonamide antibiotics)        Current Facility-Administered Medications   Medication    acetaminophen tablet 1,000 mg    clindamycin 900 MG/50 ML D5W 900 mg/50 mL IVPB 900 mg    hydrocodone-acetaminophen 5-325mg per tablet 1 tablet    lisinopril tablet 2.5 mg    morphine injection 2 mg    ondansetron injection 4 mg    senna-docusate 8.6-50 mg per tablet 1 tablet    sodium chloride 0.9% flush 5 mL     Objective:     Vital Signs (Most Recent):  Temp: 97 °F (36.1 °C) (04/20/18 0800)  Pulse: 65 (04/20/18 0800)  Resp: 20 (04/20/18 0800)  BP: 119/66 (04/20/18 0800)  SpO2: 98 % (04/20/18 0800) Vital Signs (24h Range):  Temp:  [96.4 °F (35.8 °C)-98.7 °F (37.1 °C)] 97 °F (36.1 °C)  Pulse:  [65-80] 65  Resp:  [8-74] 20  SpO2:  [94 %-100 %] 98 %  BP: (112-157)/(66-98) 119/66     Weight: 80.3 kg (177 lb 0.5 oz)  Height: 5' 7" (170.2 cm)  Body mass index is 27.73 kg/m².      Intake/Output Summary (Last 24 hours) at 04/20/18 0809  Last data filed at 04/20/18 0600   Gross per 24 hour   Intake             1180 ml   Output              951 ml   Net              229 ml       General    Nursing note and vitals reviewed.  Constitutional: He is oriented to person, place, and time. He appears well-developed and well-nourished. No distress.   HENT:   Nose: Nose normal.   Eyes: EOM are normal.   Cardiovascular: Regular rhythm.    Pulmonary/Chest: Effort normal.   Abdominal: Soft.   Neurological: He is alert and oriented to person, place, and time.   Psychiatric: His behavior is normal.             Right Hand/Wrist Exam     Inspection   Deformity: Hand -  deformity    Pain   Hand - The patient exhibits pain of the middle IP.    Swelling   Hand - The patient is swollen on the middle " IP.    Other     Neuorologic Exam    Median Distribution: normal  Ulnar Distribution: normal  Radial Distribution: normal      Left Hand/Wrist Exam   Left hand exam is normal.          Vascular Exam       Capillary Refill  Right Hand: normal capillary refill      Significant Labs:   BMP: No results for input(s): GLU, NA, K, CL, CO2, BUN, CREATININE, CALCIUM, MG in the last 48 hours.  CBC: No results for input(s): WBC, HGB, HCT, PLT in the last 48 hours.  CRP: No results for input(s): CRP in the last 48 hours.  All pertinent labs within the past 24 hours have been reviewed.    Significant Imaging: X-Ray: I have reviewed all pertinent results/findings and my personal findings are:  Possible foreign body but not in swollen area

## 2018-04-20 NOTE — PROGRESS NOTES
[facility] Medicine  Progress Note    Patient Name: Larry Lepley  MRN: [id number]  Patient Class: IP- Inpatient   Admission Date: 4/16/2018  Length of Stay: 3 days  Attending Physician: Davi Jarvis MD  Primary Care Provider: [facility]        Subjective:     Principal Problem:Foreign body of right middle finger with infection    HPI:  Mr. Lepley is a 76yo M with a PMH of HTN. He had presented to [place] with c/o finger pain and swelling. He cut his 3rd digit to his right hand on glass 4 days ago while trying to separate his dogs from fighting. He did notice glass in his finger. His finger and hand Has had worseining symptoms of redness, pain, and swelling. While at [place], he had an Xray which showed a foreign object to the right 3rd finger. He [place] [facility] [place]. While in the ED here, Dr. Tripp was notified of the patient case. He recommends kepping patient NPO after midnight for possible I&D and he will see him in the morning. IV Clindamycin was initiated. He currently denies pain or discomforts.          Hospital Course:  No notes on file    Interval History: Patient seen and examined.  Case discussed with orthopedic surgery.  Patient swelling appears to be slightly improved from yesterday however still having significant amount of pain requiring IV narcotics.    Review of Systems   Constitutional: Negative for chills, fatigue and fever.   Respiratory: Negative for cough and shortness of breath.    Cardiovascular: Negative for chest pain and leg swelling.   Gastrointestinal: Negative for abdominal pain, nausea and vomiting.   Musculoskeletal: Positive for joint swelling. Negative for back pain.   Neurological: Negative for weakness.   Psychiatric/Behavioral: Negative for confusion. The patient is not nervous/anxious.      Objective:     Vital Signs (Most Recent):  Temp: 97 °F (36.1 °C) (04/19/18 2054)  Pulse: 78 (04/19/18  2054)  Resp: 16 (04/19/18 2054)  BP: 118/74 (04/19/18 2054)  SpO2: (!) 94 % (04/19/18 2054) Vital Signs (24h Range):  Temp:  [97 °F (36.1 °C)-98.8 °F (37.1 °C)] 97 °F (36.1 °C)  Pulse:  [65-86] 78  Resp:  [8-74] 16  SpO2:  [93 %-100 %] 94 %  BP: (110-157)/(67-98) 118/74     Weight: 80.3 kg (177 lb 0.5 oz)  Body mass index is 27.73 kg/m².    Intake/Output Summary (Last 24 hours) at 04/19/18 2106  Last data filed at 04/19/18 1900   Gross per 24 hour   Intake              700 ml   Output              701 ml   Net               -1 ml      Physical Exam   Constitutional: He appears well-developed and well-nourished.   Eyes: EOM are normal. Pupils are equal, round, and reactive to light.   Neck: No JVD present.   Cardiovascular: Normal rate, regular rhythm, normal heart sounds and intact distal pulses.    Pulmonary/Chest: Effort normal and breath sounds normal.   Abdominal: Soft. Bowel sounds are normal. He exhibits no distension. There is no tenderness.   Musculoskeletal: He exhibits edema. He exhibits no deformity.   R 3rd digit with pain, erythema   Lymphadenopathy:     He has no cervical adenopathy.   Skin: No rash noted. No pallor.   Nursing note and vitals reviewed.      Significant Labs: All pertinent labs within the past 24 hours have been reviewed.    Significant Imaging: I have reviewed all pertinent imaging results/findings within the past 24 hours.    Assessment/Plan:      * Foreign body of right middle finger with infection    patient with foreign body in place in the hand, discussed with orthopedic surgery will take patient to drainage of PIP joint and continue IV antibiotics for now.  Patient appears to be improving and doing well will hopefully be able to discharge home next 24-48 hours.          Essential hypertension    Chronic, controlled.  Monitor BP closely.  Will continue BP medications as needed for sustained BP control. Lowered ACEi d/t low BP on 4/17.            VTE Risk Mitigation         Ordered      Place LAYLA hose  Until discontinued      04/19/18 1200     Place sequential compression device  Until discontinued      04/19/18 1200     IP VTE LOW RISK PATIENT  Once      04/16/18 4805              Davi Jarvis MD  Department of Hospital Medicine   [facility]

## 2018-04-21 NOTE — DISCHARGE SUMMARY
[facility] Medicine  Discharge Summary      Patient Name: Larry Lepley  MRN: [id number]  Admission Date: 4/16/2018  Hospital Length of Stay: 4 days  Discharge Date and Time: 4/20/2018  6:49 PM  Attending Physician: No att. providers found   Discharging Provider: Davi Jarvis MD  Primary Care Provider: [facility]      HPI:   Mr. Lepley is a 76yo M with a PMH of HTN. He had presented to [place] with c/o finger pain and swelling. He cut his 3rd digit to his right hand on glass 4 days ago while trying to separate his dogs from fighting. He did notice glass in his finger. His finger and hand Has had worseining symptoms of redness, pain, and swelling. While at [place], he had an Xray which showed a foreign object to the right 3rd finger. He was transferred to [facility] for orthopedics services. While in the ED here, Dr. Tripp was notified of the patient case. He recommends kepping patient NPO after midnight for possible I&D and he will see him in the morning. IV Clindamycin was initiated. He currently denies pain or discomforts.          Procedure(s) (LRB):  INCISION AND DRAINAGE (I & D)-HAND (Right)      Hospital Course:   Patient is a 77-year-old male with a past medical history significant for hypertension who presents to the hospital with pain and swelling of his third digit of his right hand.  Patient was initially started on IV antibiotics and improved although it was a suspicion for foreign body noted in the patient's hand.  Patient underwent surgical exploration and drainage of his PIP joint which had some purulent drainage and was and did well after surgery with improvement of his pain and symptoms.  He remained afebrile for the duration of his hospitalization.  He was discharged home with 10 days of oral antibiotics as well as oral pain medication instructed to follow back up with orthopedic surgery if he has worsening pain or symptoms.  Patient was stable  at a symptomatically baseline at time of discharge.     Consults:   Consults         Status Ordering Provider     Inpatient consult to Orthopedic Surgery  Once     Provider:  Stan Arroyo MD    Completed SHERON MULLIGAN          No new Assessment & Plan notes have been filed under this hospital service since the last note was generated.  Service: Hospital Medicine    Final Active Diagnoses:    Diagnosis Date Noted POA    PRINCIPAL PROBLEM:  Foreign body of right middle finger with infection [S60.452A, L08.9] 04/16/2018 Yes    Essential hypertension [I10] 04/16/2018 Yes      Problems Resolved During this Admission:    Diagnosis Date Noted Date Resolved POA       Discharged Condition: stable    Disposition: Home or Self Care    Follow Up:  Follow-up Information     [facility] In 2 weeks.    Contact information:  [address] [place] [address] [place] [address] [phone]             Stan Arroyo MD In 1 week.    Specialties:  Sports Medicine, Orthopedic Surgery  Why:  Cm spoke with scheduling, she sent a message to the nurse to schedule and notify pt of appointment  Contact information:  [address] [place] [address] [address] Blanka [place]SE [address]  [phone]                 Patient Instructions:     Diet Adult Regular     Activity as tolerated     Notify your health care provider if you experience any of the following:  temperature >100.4     Notify your health care provider if you experience any of the following:  severe uncontrolled pain     Notify your health care provider if you experience any of the following:  redness, tenderness, or signs of infection (pain, swelling, redness, odor or green/yellow discharge around incision site)     Change dressing (specify)   Order Comments: Dressing change: 1 times per day using band-aid.         Significant Diagnostic Studies:     Pending Diagnostic Studies:     None         Medications:  Reconciled Home Medications:      Medication List      START  taking these medications    acetaminophen 500 MG tablet  Commonly known as:  TYLENOL  Take 2 tablets (1,000 mg total) by mouth every 6 (six) hours as needed for Pain or Temperature greater than (or equal to 101 degree F).     clindamycin 300 MG capsule  Commonly known as:  CLEOCIN  Take 1 capsule (300 mg total) by mouth 3 (three) times daily.     hydrocodone-acetaminophen 5-325mg 5-325 mg per tablet  Commonly known as:  NORCO  Take 1 tablet by mouth every 4 (four) hours as needed.        CONTINUE taking these medications    FLEXERIL ORAL  Take by mouth. Pt is unsure of dose  But says he takes several a day     LISINOPRIL ORAL  Take 10 mg by mouth once daily.        STOP taking these medications    TRAMADOL ORAL            Indwelling Lines/Drains at time of discharge:   Lines/Drains/Airways          No matching active lines, drains, or airways          Time spent on the discharge of patient: 32 minutes  Patient was seen and examined on the date of discharge and determined to be suitable for discharge.         Davi Jarvis MD  Department of Hospital Medicine  [facility]

## 2018-04-21 NOTE — HOSPITAL COURSE
Patient is a 77-year-old male with a past medical history significant for hypertension who presents to the hospital with pain and swelling of his third digit of his right hand.  Patient was initially started on IV antibiotics and improved although it was a suspicion for foreign body noted in the patient's hand.  Patient underwent surgical exploration and drainage of his PIP joint which had some purulent drainage and was and did well after surgery with improvement of his pain and symptoms.  He remained afebrile for the duration of his hospitalization.  He was discharged home with 10 days of oral antibiotics as well as oral pain medication instructed to follow back up with orthopedic surgery if he has worsening pain or symptoms.  Patient was stable at a symptomatically baseline at time of discharge.

## 2018-04-23 ENCOUNTER — TELEPHONE (OUTPATIENT)
Dept: ORTHOPEDICS | Facility: CLINIC | Age: 78
End: 2018-04-23

## 2018-04-23 ENCOUNTER — TELEPHONE (OUTPATIENT)
Dept: MEDSURG UNIT | Facility: HOSPITAL | Age: 78
End: 2018-04-23

## 2018-04-23 LAB — BACTERIA SPEC AEROBE CULT: NORMAL

## 2018-04-23 NOTE — TELEPHONE ENCOUNTER
----- Message from Helen Hi sent at 4/20/2018  4:34 PM CDT -----  [facility] for 1 wk post op appt in [place], no available appts. Please contact [phone] (yajk)

## 2018-04-23 NOTE — PLAN OF CARE
Late Entry on 4/23/18 @ 12:44pm:  Marques received a call from BELLA Sarah and she requested the Discharge Summary.  [facility]  [facility] at [phone].       04/23/18 1249   Discharge Reassessment   Assessment Type Discharge Planning Assessment

## 2018-04-24 LAB — BACTERIA SPEC ANAEROBE CULT: NORMAL

## 2018-04-27 NOTE — ED NOTES
Pt moved to ER bed 7 for further evaluation. Pt reports glass got into his third finger on his R hand on Thursday, but swelling began Friday night.   Statement Selected

## 2018-05-03 ENCOUNTER — OFFICE VISIT (OUTPATIENT)
Dept: ORTHOPEDICS | Facility: CLINIC | Age: 78
End: 2018-05-03
Payer: OTHER GOVERNMENT

## 2018-05-03 VITALS
DIASTOLIC BLOOD PRESSURE: 75 MMHG | HEIGHT: 67 IN | SYSTOLIC BLOOD PRESSURE: 131 MMHG | WEIGHT: 177 LBS | BODY MASS INDEX: 27.78 KG/M2 | HEART RATE: 101 BPM

## 2018-05-03 DIAGNOSIS — L03.011 CELLULITIS OF RIGHT INDEX FINGER: Primary | ICD-10-CM

## 2018-05-03 DIAGNOSIS — S60.551D FOREIGN BODY OF RIGHT HAND, SUBSEQUENT ENCOUNTER: Primary | ICD-10-CM

## 2018-05-03 PROCEDURE — 99999 PR PBB SHADOW E&M-EST. PATIENT-LVL III: CPT | Mod: PBBFAC,,, | Performed by: ORTHOPAEDIC SURGERY

## 2018-05-03 PROCEDURE — 99024 POSTOP FOLLOW-UP VISIT: CPT | Mod: ,,, | Performed by: ORTHOPAEDIC SURGERY

## 2018-05-03 PROCEDURE — 99213 OFFICE O/P EST LOW 20 MIN: CPT | Mod: PBBFAC,PN | Performed by: ORTHOPAEDIC SURGERY

## 2018-05-04 NOTE — PROGRESS NOTES
Past Medical History:   Diagnosis Date    Hypertension        Past Surgical History:   Procedure Laterality Date    CARPAL TUNNEL RELEASE Bilateral     HIP ARTHROPLASTY Left        Current Outpatient Prescriptions   Medication Sig    acetaminophen (TYLENOL) 500 MG tablet Take 2 tablets (1,000 mg total) by mouth every 6 (six) hours as needed for Pain or Temperature greater than (or equal to 101 degree F).    CYCLOBENZAPRINE HCL (FLEXERIL ORAL) Take by mouth. Pt is unsure of dose  But says he takes several a day    hydrocodone-acetaminophen 5-325mg (NORCO) 5-325 mg per tablet Take 1 tablet by mouth every 4 (four) hours as needed.    LISINOPRIL ORAL Take 10 mg by mouth once daily.      No current facility-administered medications for this visit.        Review of patient's allergies indicates:   Allergen Reactions    Aspirin (bulk) Swelling    Sulfa (sulfonamide antibiotics)        Family History   Problem Relation Age of Onset    No Known Problems Mother     No Known Problems Father        Social History     Social History    Marital status:      Spouse name: N/A    Number of children: N/A    Years of education: N/A     Occupational History    Not on file.     Social History Main Topics    Smoking status: Former Smoker     Quit date: 4/16/1997    Smokeless tobacco: Never Used    Alcohol use No    Drug use: No    Sexual activity: Not on file     Other Topics Concern    Not on file     Social History Narrative    No narrative on file       Chief Complaint:   Chief Complaint   Patient presents with    Right Hand - Pain       Date of surgery:  April 19, 2018    History of present illness:  77-year-old male seen after I and D of his right middle finger.  Patient cut his hand on some glass and got an infection.  Patient had an I and D.  Still having a lot of swelling and pain.  Rates the pain as a 9/10.  Has finished his oral antibiotics.      Review of Systems:    Musculoskeletal:  See  HPI        Physical Examination:    Vital Signs:    Vitals:    05/03/18 1325   BP: 131/75   Pulse: 101       Body mass index is 27.72 kg/m².    This a well-developed, well nourished patient in no acute distress.  They are alert and oriented and cooperative to examination.  Pt. walks without an antalgic gait.      Examination of the right hand shows well-healed surgical incision. Patient has minimal erythema around the finger but a lot of swelling and pain near the PIP joint still.  Cannot bend the PIP joint.    X-rays:  None     Assessment::  Status post right middle finger I and D with possible residual infection    Plan:  I reviewed the findings with him today.  I recommended an MRI to further evaluate his right middle finger.  Follow up after the MRI is completed.    This note was created using Dragon voice recognition software that occasionally misinterpreted phrases or words.

## 2018-05-10 ENCOUNTER — TELEPHONE (OUTPATIENT)
Dept: ORTHOPEDICS | Facility: CLINIC | Age: 78
End: 2018-05-10

## 2018-05-10 NOTE — TELEPHONE ENCOUNTER
----- Message from Eugenia Carter sent at 5/10/2018  9:29 AM CDT -----  [facility]ra[facility] / ph [phone] fax [phone] / [facility] ... Please fax orders for mri   
Faxed per request.  
abdomen soft, non-tender, and non-distended. Bowel sounds present.

## 2018-05-14 NOTE — ED PROVIDER NOTES
Encounter Date: 4/16/2018       History     Chief Complaint   Patient presents with    Cellulitis     HPI  Review of patient's allergies indicates:   Allergen Reactions    Aspirin (bulk) Swelling    Sulfa (sulfonamide antibiotics)      Past Medical History:   Diagnosis Date    Hypertension      Past Surgical History:   Procedure Laterality Date    CARPAL TUNNEL RELEASE Bilateral     HIP ARTHROPLASTY Left      Family History   Problem Relation Age of Onset    No Known Problems Mother     No Known Problems Father      Social History   Substance Use Topics    Smoking status: Former Smoker     Quit date: 4/16/1997    Smokeless tobacco: Never Used    Alcohol use No     Review of Systems    Physical Exam     Initial Vitals [04/16/18 1217]   BP Pulse Resp Temp SpO2   117/89 93 18 98.1 °F (36.7 °C) 95 %      MAP       98.33         Physical Exam    ED Course   Procedures  Labs Reviewed - No data to display          Medical Decision Making:   Initial Assessment:   Cellulits  ED Management:   will resume care  Plan to transfer to hand specialist                      Clinical Impression:   The encounter diagnosis was Cellulitis of right index finger.                           Tapan Nance MD  07/24/18 7319

## 2019-12-12 NOTE — OP NOTE
Outpatient Physical Therapy Ortho Treatment Note/Discharge Summary  [facility]     Patient Name: Viktoria Mukherjee  : 1969  MRN: [id number]  Today's Date: 2019      Visit Date: 2019    Visit Dx:    ICD-10-CM ICD-9-CM   1. Cervical pain M54.2 723.1   2. Poor posture R29.3 781.92       Patient Active Problem List   Diagnosis   • Hypothyroidism, adult   • Allergic rhinitis   • Asthma   • Tic disorder   • Idiopathic hypersomnia with long sleep time   • Chronic fatigue   • Sleep concern   • Bruxism        Past Medical History:   Diagnosis Date   • Allergic rhinitis    • Asthma    • Disease of thyroid gland    • Tic disorder         Past Surgical History:   Procedure Laterality Date   •  SECTION  2006   • DIAGNOSTIC LAPAROSCOPY  1987                           Modalities     Row Name 19 170             Moist Heat    MH Applied  Yes  -CN      Location  cervical spine with mechanical traction  -CN      Rx Minutes  10 mins  -CN         Traction 28406    Traction Type  Cervical  -CN      Rx Minutes  10  -CN      Duration  Intermittent  -CN      Position  Hook-lying  -CN      Weight  18 /8  -CN      Hold  40  -CN      Relax  10  -CN        User Key  (r) = Recorded By, (t) = Taken By, (c) = Cosigned By    Initials Name Provider Type    Dorina Brown, PT Physical Therapist          OP Exercises     Row Name 19 1700             Subjective Comments    Subjective Comments  It feels ok, my vertigo is still acting up a little bit.   -CN         Subjective Pain    Able to rate subjective pain?  yes  -CN      Pre-Treatment Pain Level  1  -CN         Total Minutes    24178 - PT Manual Therapy Minutes  28  -CN        User Key  (r) = Recorded By, (t) = Taken By, (c) = Cosigned By    Initials Name Provider Type    Dorina Brown, PT Physical Therapist                        Manual Rx (last 36 hours)      Manual Treatments     Row Name 19 1702             Total  DATE OF PROCEDURE:  04/19/2018    STAFF SURGEON:  Stan Arroyo M.D.    ASSISTANT:  Silvestre Garzon.    PREOPERATIVE DIAGNOSIS:  Right middle finger infection.    POSTOPERATIVE DIAGNOSIS:  Right middle finger infection.    PROCEDURES:  Incision and drainage of right middle finger.    ANESTHESIA:  General endotracheal anesthesia.    FINDINGS:  Include a small amount of purulence along the ulnar aspect of the   right middle finger near the PIP joint.  No foreign body was detected or   removed.    SPECIMENS:  Aerobic and anaerobic cultures.    COMPLICATIONS:  None.    CONDITION UPON LEAVING THE OR:  Stable.    INDICATIONS FOR THE PROCEDURE:  A 77-year-old male who cut his right middle   finger with some glass about a week prior started developing some infection and   swelling.  The patient presented to the Emergency Room at [place] and was   transferred for definitive IV antibiotics and possible surgery.  The patient   improved somewhat over the following couple of days after IV antibiotics, but   continues to have a fairly large swelling fingers, so it was decided at that   point to go ahead and proceed with the incision and drainage.    PROCEDURE IN DETAIL:  Risks, benefits and alternatives of the procedures were   explained to the patient including, but not limited to damage to nerves,   arteries or blood vessels, continued need for IV antibiotics and possible   continued infection, DVT, PE, as well as anesthetic complications including   seizure, stroke, heart attack and death, understood this and signed informed   consent.  The patient's right middle finger was marked prior to coming to the   Operating Room.  Once a formal timeout was done in which correct patient,   procedure and op site were correctly identified and confirmed by the entire   operating team.  Antibiotics were given prior on the floor.  The patient's right   upper extremity was prepped and draped in normal sterile fashion.  The patient   had a  Minutes    21089 - PT Manual Therapy Minutes  28  -CN         Manual Rx 1    Manual Rx 1 Location  neck  -CN      Manual Rx 1 Type  cerv pglide lower cervical B   -CN         Manual Rx 2    Manual Rx 2 Location  neck  -CN      Manual Rx 2 Type  STM suboccipitals, levator, UT B  -CN      Manual Rx 2 Grade  moderate pressure  -CN        User Key  (r) = Recorded By, (t) = Taken By, (c) = Cosigned By    Initials Name Provider Type    Dorina Brown PT Physical Therapist          PT OP Goals     Row Name 12/11/19 1700          PT Short Term Goals    STG Date to Achieve  10/28/19  -CN     STG 1  Pt will report pain rated 2/10 at worst in order to demonstrate ability to return to normalized ADLs and functional activities.  -CN     STG 1 Progress  Met  -CN     STG 1 Progress Comments  Pt reports pain rated 1-2 at worst.   -CN     STG 2  Pt will be independent with initial HEP for symptom management.  -CN     STG 2 Progress  Met  -CN        Long Term Goals    LTG Date to Achieve  11/18/19  -CN     LTG 1  Pt will be independent and compliant with advanced HEP for long term management of symptoms and prevention of future occurrence.   -CN     LTG 1 Progress  Met  -CN     LTG 1 Progress Comments  --  -CN     LTG 2  Pt will reduce level of perceived disability as measured by the NDI  to 8% in order to improve QOL.  -CN     LTG 2 Progress  Met  -CN     LTG 3  Pt will report 50% improvement in radicular symptoms to R UE.   -CN     LTG 3 Progress  Met  -CN       User Key  (r) = Recorded By, (t) = Taken By, (c) = Cosigned By    Initials Name Provider Type    Dorina Brown PT Physical Therapist          Therapy Education  Given: HEP, Symptoms/condition management, Posture/body mechanics, Pain management, Mobility training  Program: Reinforced  How Provided: Verbal, Demonstration  Provided to: Patient  Level of Understanding: Teach back education performed, Verbalized, Demonstrated    Outcome Measure  small little open area along the ulnar aspect of his middle finger near   the PIP joint.  This was incised by 1 cm proximal, 1 cm distal.  A small   purulence came out when it was opened.  We then used blunt dissection using   hemostat to proceed into the tuft of the middle phalanx and the proximal   phalanx.  No more purulence was detected.  No foreign body was felt or palpated,   so we then proceeded.  The cultures were taken of the purulence at that time.    We then thoroughly irrigated out the wound using a liter of normal saline.    After this was done, we then closed the surgical portion proximally and distally   with a 3-0 nylon in simple interrupted fashion.  The central hole was left open   so that one could continue to drain up.  After this was done, a sterile   dressing was applied.  He was extubated, awakened, transferred from the   Operating Room to the Recovery Room in stable condition.      MARY/IN  dd: 04/19/2018 18:02:56 (CDT)  td: 04/20/2018 00:20:45 (JODIE)  Doc ID   #[id number]  Job ID #[id number]    CC:    Options: Neck Disability Index (NDI)(8% where 0% is no disability)         Time Calculation:   Start Time: 1648  Stop Time: 1726  Time Calculation (min): 38 min  Therapy Charges for Today     Code Description Service Date Service Provider Modifiers Qty    85135742190 HC PT MANUAL THERAPY EA 15 MIN 12/11/2019 Dorina Cintron, PT 61, GP 2    89439516907 HC PT HOT OR COLD PACK TREAT MCARE 12/11/2019 Dorina Cintron, PT GP 1    66330918136 HC PT-TRACTION MECHANICAL 12/11/2019 Dorina Cintron, PT 61 1          PT G-Codes  Outcome Measure Options: Neck Disability Index (NDI)(8% where 0% is no disability)     OP PT Discharge Summary  Date of Discharge: 12/11/19  Reason for Discharge: Maximum functional potential achieved, Independent  Outcomes Achieved: Patient able to partially acheive established goals  Discharge Destination: Home with home program  Discharge Instructions/Additional Comments: Pt attended skilled PT for treatment of cervical radiculopathy. Pt with improvement in symptoms overall since onset and independent with current flexibility and postural strengthening program. Pt self D/C to independent management of symptoms.       Dorina Cintron, KAYLAN  12/11/2019

## 2020-04-29 ENCOUNTER — HOSPITAL ENCOUNTER (INPATIENT)
Facility: HOSPITAL | Age: 80
LOS: 2 days | Discharge: HOME | DRG: 193 | End: 2020-05-01
Attending: INTERNAL MEDICINE | Admitting: INTERNAL MEDICINE
Payer: OTHER GOVERNMENT

## 2020-04-29 DIAGNOSIS — R06.02 SHORTNESS OF BREATH: ICD-10-CM

## 2020-04-29 DIAGNOSIS — L08.9 FOREIGN BODY OF RIGHT MIDDLE FINGER WITH INFECTION: ICD-10-CM

## 2020-04-29 DIAGNOSIS — S60.452A FOREIGN BODY OF RIGHT MIDDLE FINGER WITH INFECTION: ICD-10-CM

## 2020-04-29 DIAGNOSIS — I10 ESSENTIAL HYPERTENSION: ICD-10-CM

## 2020-04-29 DIAGNOSIS — L03.011 CELLULITIS OF RIGHT INDEX FINGER: ICD-10-CM

## 2020-04-29 DIAGNOSIS — J18.9 ATYPICAL PNEUMONIA: ICD-10-CM

## 2020-04-29 DIAGNOSIS — J18.9 PNEUMONIA DUE TO INFECTIOUS ORGANISM, UNSPECIFIED LATERALITY, UNSPECIFIED PART OF LUNG: Primary | ICD-10-CM

## 2020-04-29 LAB
ALBUMIN SERPL BCP-MCNC: 3.7 G/DL (ref 3.5–5.2)
ALLENS TEST: ABNORMAL
ALP SERPL-CCNC: 88 U/L (ref 55–135)
ALT SERPL W/O P-5'-P-CCNC: 17 U/L (ref 10–44)
ANION GAP SERPL CALC-SCNC: 11 MMOL/L (ref 8–16)
ANION GAP SERPL CALC-SCNC: 12 MMOL/L (ref 8–16)
AST SERPL-CCNC: 21 U/L (ref 10–40)
BACTERIA #/AREA URNS HPF: ABNORMAL /HPF
BASOPHILS # BLD AUTO: 0.04 K/UL (ref 0–0.2)
BASOPHILS NFR BLD: 0.2 % (ref 0–1.9)
BILIRUB SERPL-MCNC: 1.3 MG/DL (ref 0.1–1)
BILIRUB UR QL STRIP: NEGATIVE
BNP SERPL-MCNC: 78 PG/ML (ref 0–99)
BUN SERPL-MCNC: 22 MG/DL (ref 8–23)
BUN SERPL-MCNC: 23 MG/DL (ref 8–23)
CALCIUM SERPL-MCNC: 8 MG/DL (ref 8.7–10.5)
CALCIUM SERPL-MCNC: 8.3 MG/DL (ref 8.7–10.5)
CHLORIDE SERPL-SCNC: 94 MMOL/L (ref 95–110)
CHLORIDE SERPL-SCNC: 96 MMOL/L (ref 95–110)
CK MB SERPL-MCNC: 2.5 NG/ML (ref 0.1–6.5)
CK MB SERPL-RTO: 0.9 % (ref 0–5)
CK SERPL-CCNC: 291 U/L (ref 20–200)
CLARITY UR: CLEAR
CO2 SERPL-SCNC: 21 MMOL/L (ref 23–29)
CO2 SERPL-SCNC: 24 MMOL/L (ref 23–29)
COLOR UR: YELLOW
CREAT SERPL-MCNC: 1 MG/DL (ref 0.5–1.4)
CREAT SERPL-MCNC: 1.3 MG/DL (ref 0.5–1.4)
DELSYS: ABNORMAL
DIFFERENTIAL METHOD: ABNORMAL
EOSINOPHIL # BLD AUTO: 0 K/UL (ref 0–0.5)
EOSINOPHIL NFR BLD: 0 % (ref 0–8)
ERYTHROCYTE [DISTWIDTH] IN BLOOD BY AUTOMATED COUNT: 13.6 % (ref 11.5–14.5)
EST. GFR  (AFRICAN AMERICAN): >60 ML/MIN/1.73 M^2
EST. GFR  (AFRICAN AMERICAN): >60 ML/MIN/1.73 M^2
EST. GFR  (NON AFRICAN AMERICAN): 52.6 ML/MIN/1.73 M^2
EST. GFR  (NON AFRICAN AMERICAN): >60 ML/MIN/1.73 M^2
FLOW: 3
GLUCOSE SERPL-MCNC: 192 MG/DL (ref 70–110)
GLUCOSE SERPL-MCNC: 226 MG/DL (ref 70–110)
GLUCOSE UR QL STRIP: NEGATIVE
HCO3 UR-SCNC: 21 MMOL/L (ref 24–28)
HCT VFR BLD AUTO: 35 % (ref 40–54)
HGB BLD-MCNC: 11.6 G/DL (ref 14–18)
HGB UR QL STRIP: ABNORMAL
HYALINE CASTS #/AREA URNS LPF: 0 /LPF
IMM GRANULOCYTES # BLD AUTO: 0.12 K/UL (ref 0–0.04)
IMM GRANULOCYTES NFR BLD AUTO: 0.6 % (ref 0–0.5)
INFLUENZA A, MOLECULAR: NEGATIVE
INFLUENZA B, MOLECULAR: NEGATIVE
KETONES UR QL STRIP: NEGATIVE
LACTATE SERPL-SCNC: 2.2 MMOL/L (ref 0.5–2.2)
LACTATE SERPL-SCNC: 2.5 MMOL/L (ref 0.5–2.2)
LEUKOCYTE ESTERASE UR QL STRIP: NEGATIVE
LYMPHOCYTES # BLD AUTO: 0.4 K/UL (ref 1–4.8)
LYMPHOCYTES NFR BLD: 2.2 % (ref 18–48)
MAGNESIUM SERPL-MCNC: 1.3 MG/DL (ref 1.6–2.6)
MCH RBC QN AUTO: 30.3 PG (ref 27–31)
MCHC RBC AUTO-ENTMCNC: 33.1 G/DL (ref 32–36)
MCV RBC AUTO: 91 FL (ref 82–98)
MICROSCOPIC COMMENT: ABNORMAL
MODE: ABNORMAL
MONOCYTES # BLD AUTO: 0.9 K/UL (ref 0.3–1)
MONOCYTES NFR BLD: 4.7 % (ref 4–15)
NEUTROPHILS # BLD AUTO: 17.3 K/UL (ref 1.8–7.7)
NEUTROPHILS NFR BLD: 92.3 % (ref 38–73)
NITRITE UR QL STRIP: NEGATIVE
NRBC BLD-RTO: 0 /100 WBC
PCO2 BLDA: 30.5 MMHG (ref 35–45)
PH SMN: 7.45 [PH] (ref 7.35–7.45)
PH UR STRIP: 6 [PH] (ref 5–8)
PLATELET # BLD AUTO: 185 K/UL (ref 150–350)
PMV BLD AUTO: 10.1 FL (ref 9.2–12.9)
PO2 BLDA: 67 MMHG (ref 80–100)
POC BE: -3 MMOL/L
POC SATURATED O2: 94 % (ref 95–100)
POC TCO2: 22 MMOL/L (ref 23–27)
POTASSIUM SERPL-SCNC: 3.5 MMOL/L (ref 3.5–5.1)
POTASSIUM SERPL-SCNC: 3.8 MMOL/L (ref 3.5–5.1)
PROT SERPL-MCNC: 7 G/DL (ref 6–8.4)
PROT UR QL STRIP: ABNORMAL
RBC # BLD AUTO: 3.83 M/UL (ref 4.6–6.2)
RBC #/AREA URNS HPF: 2 /HPF (ref 0–4)
SAMPLE: ABNORMAL
SARS-COV-2 RDRP RESP QL NAA+PROBE: NEGATIVE
SITE: ABNORMAL
SODIUM SERPL-SCNC: 129 MMOL/L (ref 136–145)
SODIUM SERPL-SCNC: 129 MMOL/L (ref 136–145)
SP GR UR STRIP: >=1.03 (ref 1–1.03)
SPECIMEN SOURCE: NORMAL
TROPONIN I SERPL DL<=0.01 NG/ML-MCNC: 0.07 NG/ML (ref 0.02–0.5)
URN SPEC COLLECT METH UR: ABNORMAL
UROBILINOGEN UR STRIP-ACNC: NEGATIVE EU/DL
WBC # BLD AUTO: 18.74 K/UL (ref 3.9–12.7)
WBC #/AREA URNS HPF: 5 /HPF (ref 0–5)

## 2020-04-29 PROCEDURE — 82553 CREATINE MB FRACTION: CPT

## 2020-04-29 PROCEDURE — 25000242 PHARM REV CODE 250 ALT 637 W/ HCPCS: Performed by: INTERNAL MEDICINE

## 2020-04-29 PROCEDURE — 83605 ASSAY OF LACTIC ACID: CPT

## 2020-04-29 PROCEDURE — 93005 ELECTROCARDIOGRAM TRACING: CPT

## 2020-04-29 PROCEDURE — 85025 COMPLETE CBC W/AUTO DIFF WBC: CPT

## 2020-04-29 PROCEDURE — 25000003 PHARM REV CODE 250: Performed by: INTERNAL MEDICINE

## 2020-04-29 PROCEDURE — 99223 PR INITIAL HOSPITAL CARE,LEVL III: ICD-10-PCS | Mod: ,,, | Performed by: INTERNAL MEDICINE

## 2020-04-29 PROCEDURE — 36600 WITHDRAWAL OF ARTERIAL BLOOD: CPT

## 2020-04-29 PROCEDURE — 36415 COLL VENOUS BLD VENIPUNCTURE: CPT

## 2020-04-29 PROCEDURE — 11000001 HC ACUTE MED/SURG PRIVATE ROOM

## 2020-04-29 PROCEDURE — 63600175 PHARM REV CODE 636 W HCPCS: Performed by: INTERNAL MEDICINE

## 2020-04-29 PROCEDURE — 27000221 HC OXYGEN, UP TO 24 HOURS

## 2020-04-29 PROCEDURE — 81000 URINALYSIS NONAUTO W/SCOPE: CPT

## 2020-04-29 PROCEDURE — 94760 N-INVAS EAR/PLS OXIMETRY 1: CPT

## 2020-04-29 PROCEDURE — 71045 X-RAY EXAM CHEST 1 VIEW: CPT | Mod: 26,,, | Performed by: RADIOLOGY

## 2020-04-29 PROCEDURE — 71045 X-RAY EXAM CHEST 1 VIEW: CPT | Mod: TC,FY

## 2020-04-29 PROCEDURE — 82550 ASSAY OF CK (CPK): CPT

## 2020-04-29 PROCEDURE — 83605 ASSAY OF LACTIC ACID: CPT | Mod: 91

## 2020-04-29 PROCEDURE — 87040 BLOOD CULTURE FOR BACTERIA: CPT

## 2020-04-29 PROCEDURE — 80053 COMPREHEN METABOLIC PANEL: CPT

## 2020-04-29 PROCEDURE — 71045 XR CHEST AP PORTABLE: ICD-10-PCS | Mod: 26,,, | Performed by: RADIOLOGY

## 2020-04-29 PROCEDURE — 84484 ASSAY OF TROPONIN QUANT: CPT

## 2020-04-29 PROCEDURE — 83880 ASSAY OF NATRIURETIC PEPTIDE: CPT

## 2020-04-29 PROCEDURE — 94761 N-INVAS EAR/PLS OXIMETRY MLT: CPT

## 2020-04-29 PROCEDURE — 99285 EMERGENCY DEPT VISIT HI MDM: CPT | Mod: 25

## 2020-04-29 PROCEDURE — 96365 THER/PROPH/DIAG IV INF INIT: CPT

## 2020-04-29 PROCEDURE — 99900035 HC TECH TIME PER 15 MIN (STAT)

## 2020-04-29 PROCEDURE — 99223 1ST HOSP IP/OBS HIGH 75: CPT | Mod: ,,, | Performed by: INTERNAL MEDICINE

## 2020-04-29 PROCEDURE — 80048 BASIC METABOLIC PNL TOTAL CA: CPT

## 2020-04-29 PROCEDURE — U0002 COVID-19 LAB TEST NON-CDC: HCPCS

## 2020-04-29 PROCEDURE — 87502 INFLUENZA DNA AMP PROBE: CPT

## 2020-04-29 PROCEDURE — 25000003 PHARM REV CODE 250

## 2020-04-29 PROCEDURE — 83735 ASSAY OF MAGNESIUM: CPT

## 2020-04-29 PROCEDURE — 82803 BLOOD GASES ANY COMBINATION: CPT

## 2020-04-29 PROCEDURE — 94640 AIRWAY INHALATION TREATMENT: CPT

## 2020-04-29 RX ORDER — ENOXAPARIN SODIUM 100 MG/ML
40 INJECTION SUBCUTANEOUS EVERY 24 HOURS
Status: DISCONTINUED | OUTPATIENT
Start: 2020-04-29 | End: 2020-05-01 | Stop reason: HOSPADM

## 2020-04-29 RX ORDER — SODIUM CHLORIDE 0.9 % (FLUSH) 0.9 %
10 SYRINGE (ML) INJECTION
Status: DISCONTINUED | OUTPATIENT
Start: 2020-04-29 | End: 2020-05-01 | Stop reason: HOSPADM

## 2020-04-29 RX ORDER — ACETAMINOPHEN 650 MG/1
650 SUPPOSITORY RECTAL
Status: COMPLETED | OUTPATIENT
Start: 2020-04-29 | End: 2020-04-29

## 2020-04-29 RX ORDER — DIAZEPAM 5 MG/1
TABLET ORAL
Status: COMPLETED
Start: 2020-04-29 | End: 2020-04-29

## 2020-04-29 RX ORDER — IPRATROPIUM BROMIDE AND ALBUTEROL SULFATE 2.5; .5 MG/3ML; MG/3ML
3 SOLUTION RESPIRATORY (INHALATION)
Status: COMPLETED | OUTPATIENT
Start: 2020-04-29 | End: 2020-04-29

## 2020-04-29 RX ORDER — LISINOPRIL 10 MG/1
10 TABLET ORAL DAILY
Status: DISCONTINUED | OUTPATIENT
Start: 2020-04-30 | End: 2020-05-01 | Stop reason: HOSPADM

## 2020-04-29 RX ORDER — MORPHINE SULFATE 4 MG/ML
2 INJECTION, SOLUTION INTRAMUSCULAR; INTRAVENOUS EVERY 4 HOURS PRN
Status: DISCONTINUED | OUTPATIENT
Start: 2020-04-29 | End: 2020-05-01 | Stop reason: HOSPADM

## 2020-04-29 RX ORDER — HYDROCODONE BITARTRATE AND ACETAMINOPHEN 5; 325 MG/1; MG/1
1 TABLET ORAL EVERY 4 HOURS PRN
Status: DISCONTINUED | OUTPATIENT
Start: 2020-04-29 | End: 2020-05-01 | Stop reason: HOSPADM

## 2020-04-29 RX ORDER — SODIUM CHLORIDE 9 MG/ML
INJECTION, SOLUTION INTRAVENOUS CONTINUOUS
Status: DISCONTINUED | OUTPATIENT
Start: 2020-04-29 | End: 2020-05-01 | Stop reason: HOSPADM

## 2020-04-29 RX ORDER — IPRATROPIUM BROMIDE AND ALBUTEROL SULFATE 2.5; .5 MG/3ML; MG/3ML
3 SOLUTION RESPIRATORY (INHALATION) EVERY 4 HOURS
Status: DISCONTINUED | OUTPATIENT
Start: 2020-04-29 | End: 2020-05-01 | Stop reason: HOSPADM

## 2020-04-29 RX ORDER — DIAZEPAM 5 MG/1
5 TABLET ORAL EVERY 6 HOURS PRN
Status: DISCONTINUED | OUTPATIENT
Start: 2020-04-29 | End: 2020-05-01 | Stop reason: HOSPADM

## 2020-04-29 RX ORDER — PANTOPRAZOLE SODIUM 40 MG/10ML
40 INJECTION, POWDER, LYOPHILIZED, FOR SOLUTION INTRAVENOUS DAILY
Status: DISCONTINUED | OUTPATIENT
Start: 2020-04-30 | End: 2020-05-01

## 2020-04-29 RX ORDER — ACETAMINOPHEN 500 MG
1000 TABLET ORAL EVERY 6 HOURS PRN
Status: DISCONTINUED | OUTPATIENT
Start: 2020-04-29 | End: 2020-05-01 | Stop reason: HOSPADM

## 2020-04-29 RX ADMIN — CEFTRIAXONE 2 G: 2 INJECTION, SOLUTION INTRAVENOUS at 02:04

## 2020-04-29 RX ADMIN — DIAZEPAM 5 MG: 5 TABLET ORAL at 10:04

## 2020-04-29 RX ADMIN — IPRATROPIUM BROMIDE AND ALBUTEROL SULFATE 3 ML: .5; 3 SOLUTION RESPIRATORY (INHALATION) at 08:04

## 2020-04-29 RX ADMIN — ACETAMINOPHEN 650 MG: 650 SUPPOSITORY RECTAL at 03:04

## 2020-04-29 RX ADMIN — ACETAMINOPHEN 1000 MG: 500 TABLET, FILM COATED ORAL at 10:04

## 2020-04-29 RX ADMIN — IPRATROPIUM BROMIDE AND ALBUTEROL SULFATE 3 ML: .5; 3 SOLUTION RESPIRATORY (INHALATION) at 11:04

## 2020-04-29 RX ADMIN — ENOXAPARIN SODIUM 40 MG: 100 INJECTION SUBCUTANEOUS at 04:04

## 2020-04-29 RX ADMIN — METHYLPREDNISOLONE SODIUM SUCCINATE 80 MG: 40 INJECTION, POWDER, FOR SOLUTION INTRAMUSCULAR; INTRAVENOUS at 10:04

## 2020-04-29 RX ADMIN — IPRATROPIUM BROMIDE AND ALBUTEROL SULFATE 3 ML: .5; 3 SOLUTION RESPIRATORY (INHALATION) at 02:04

## 2020-04-29 NOTE — ASSESSMENT & PLAN NOTE
04/29/2020:  · Begin empiric antibiotic therapy with ceftriaxone 1 g q.12 hours, and this azithromycin 500 mg IV daily  · Continue respiratory toilet with DuoNeb nebulizer treatments every 4 hr  · Incentive spirometry every 4 hr  · Repeat chest x-ray in the a.m..  · Guaifenesin 600 mg p.o. b.i.d.  · Monitor progression of symptoms or chest x-ray findings in the a.m..  · Repeat ABG in the a.m.  ·

## 2020-04-29 NOTE — SUBJECTIVE & OBJECTIVE
Past Medical History:   Diagnosis Date    Hypertension        Past Surgical History:   Procedure Laterality Date    CARPAL TUNNEL RELEASE Bilateral     HIP ARTHROPLASTY Left        Review of patient's allergies indicates:   Allergen Reactions    Aspirin (bulk) Swelling    Sulfa (sulfonamide antibiotics)        No current facility-administered medications on file prior to encounter.      Current Outpatient Medications on File Prior to Encounter   Medication Sig    CYCLOBENZAPRINE HCL (FLEXERIL ORAL) Take by mouth. Pt is unsure of dose  But says he takes several a day    hydrocodone-acetaminophen 5-325mg (NORCO) 5-325 mg per tablet Take 1 tablet by mouth every 4 (four) hours as needed.    acetaminophen (TYLENOL) 500 MG tablet Take 2 tablets (1,000 mg total) by mouth every 6 (six) hours as needed for Pain or Temperature greater than (or equal to 101 degree F).    LISINOPRIL ORAL Take 10 mg by mouth once daily.      Family History     Problem Relation (Age of Onset)    No Known Problems Mother, Father        Tobacco Use    Smoking status: Former Smoker     Last attempt to quit: 1997     Years since quittin.0    Smokeless tobacco: Never Used   Substance and Sexual Activity    Alcohol use: No    Drug use: No    Sexual activity: Not on file     Review of Systems   Constitutional: Positive for fatigue and fever. Negative for activity change and appetite change.   HENT: Positive for congestion and rhinorrhea. Negative for ear discharge, mouth sores, nosebleeds, sinus pressure, sinus pain and tinnitus.    Eyes: Negative.  Negative for pain, redness and itching.   Respiratory: Positive for shortness of breath and wheezing. Negative for apnea, cough, choking, chest tightness and stridor.    Cardiovascular: Positive for chest pain and leg swelling. Negative for palpitations.   Gastrointestinal: Negative for abdominal distention, abdominal pain, anal bleeding, blood in stool, constipation and diarrhea.    Endocrine: Negative.    Genitourinary: Negative for difficulty urinating, flank pain, frequency and urgency.   Musculoskeletal: Positive for arthralgias and myalgias. Negative for back pain and gait problem.   Skin: Negative for color change and pallor.   Allergic/Immunologic: Negative.    Neurological: Positive for dizziness, weakness and light-headedness. Negative for facial asymmetry and headaches.   Hematological: Negative for adenopathy. Does not bruise/bleed easily.   Psychiatric/Behavioral: The patient is nervous/anxious.    All other systems reviewed and are negative.    Objective:     Vital Signs (Most Recent):  Temp: (!) 102.4 °F (39.1 °C) (04/29/20 1550)  Pulse: (!) 113 (04/29/20 1550)  Resp: (!) 22 (04/29/20 1550)  BP: 120/84 (04/29/20 1550)  SpO2: 96 % (04/29/20 1550) Vital Signs (24h Range):  Temp:  [102.2 °F (39 °C)-102.4 °F (39.1 °C)] 102.4 °F (39.1 °C)  Pulse:  [106-130] 113  Resp:  [20-34] 22  SpO2:  [88 %-99 %] 96 %  BP: (120-151)/() 120/84     Weight: 98.8 kg (217 lb 13 oz)  Body mass index is 34.11 kg/m².    Physical Exam   Constitutional: He is oriented to person, place, and time. He appears well-developed and well-nourished.   HENT:   Head: Normocephalic and atraumatic.   Eyes: Pupils are equal, round, and reactive to light. EOM are normal.   Neck: Normal range of motion. Neck supple. No tracheal deviation present. No thyromegaly present.   Cardiovascular: Normal rate, regular rhythm and normal heart sounds.   Pulmonary/Chest: Effort normal and breath sounds normal.   Abdominal: Soft. Bowel sounds are normal. He exhibits no distension. There is no tenderness. There is no rebound and no guarding.   Musculoskeletal: Normal range of motion.   Lymphadenopathy:     He has no cervical adenopathy.   Neurological: He is alert and oriented to person, place, and time.   Skin: Skin is warm and dry. Capillary refill takes less than 2 seconds.   Psychiatric: He has a normal mood and affect. His  behavior is normal. Judgment and thought content normal.   Nursing note and vitals reviewed.        CRANIAL NERVES     CN III, IV, VI   Pupils are equal, round, and reactive to light.  Extraocular motions are normal.        Significant Labs:   Recent Lab Results       04/29/20  1448   04/29/20  1400   04/29/20  1335   04/29/20  1301   04/29/20  1259        Influenza A, Molecular     Negative         Influenza B, Molecular     Negative         Albumin         3.7     Alkaline Phosphatase         88     Allens Test Pass             ALT         17     Anion Gap         12     Appearance, UA       Clear       AST         21     Bacteria, UA       Few       Baso #   0.04           Basophil%   0.2           Bilirubin (UA)       Negative       BILIRUBIN TOTAL         1.3  Comment:  For infants and newborns, interpretation of results should be based  on gestational age, weight and in agreement with clinical  observations.  Premature Infant recommended reference ranges:  Up to 24 hours.............<8.0 mg/dL  Up to 48 hours............<12.0 mg/dL  3-5 days..................<15.0 mg/dL  6-29 days.................<15.0 mg/dL       BNP   78  Comment:  Values of less than 100 pg/ml are consistent with non-CHF populations.           Site RR             BUN, Bld         22     Calcium         8.3     Chloride         96     CO2         21     Color, UA       Yellow       Creatinine         1.0     DelSys Nasal Can             Differential Method   Automated           eGFR if          >60.0     eGFR if non          >60.0  Comment:  Calculation used to obtain the estimated glomerular filtration  rate (eGFR) is the CKD-EPI equation.        Eos #   0.0           Eosinophil%   0.0           Flow 3             Flu A & B Source     Nasal Swab         Glucose         226     Glucose, UA       Negative       Gran # (ANC)   17.3           Gran%   92.3           Hematocrit   35.0           Hemoglobin   11.6            Hyaline Casts, UA       0       Immature Grans (Abs)   0.12  Comment:  Mild elevation in immature granulocytes is non specific and   can be seen in a variety of conditions including stress response,   acute inflammation, trauma and pregnancy. Correlation with other   laboratory and clinical findings is essential.             Immature Granulocytes   0.6           Ketones, UA       Negative       Lactate, Pepito         2.5  Comment:  Falsely low lactic acid results can be found in samples   containing >=13.0 mg/dL total bilirubin and/or >=3.5 mg/dL   direct bilirubin.       Leukocytes, UA       Negative       Lymph #   0.4           Lymph%   2.2           Magnesium         1.3     MCH   30.3           MCHC   33.1           MCV   91           Microscopic Comment       SEE COMMENT  Comment:  Other formed elements not mentioned in the report are not   present in the microscopic examination.          Mode SPONT             Mono #   0.9           Mono%   4.7           MPV   10.1           NITRITE UA       Negative       nRBC   0           Occult Blood UA       Trace       pH, UA       6.0       Platelets   185           POC BE -3             POC HCO3 21.0             POC PCO2 30.5             POC PH 7.446             POC PO2 67             POC SATURATED O2 94             POC TCO2 22             Potassium         3.8     PROTEIN TOTAL         7.0     Protein, UA       2+  Comment:  Recommend a 24 hour urine protein or a urine   protein/creatinine ratio if globulin induced proteinuria is  clinically suspected.         RBC   3.83           RBC, UA       2       RDW   13.6           Sample ARTERIAL             SARS-CoV-2 RNA, Amplification, Qual               Sodium         129     Specific Gravity, UA       >=1.030       Specimen UA       Urine, Clean Catch       UROBILINOGEN UA       Negative       WBC, UA       5       WBC   18.74                            04/29/20  1257        Influenza A, Molecular       Influenza  B, Molecular       Albumin       Alkaline Phosphatase       Allens Test       ALT       Anion Gap       Appearance, UA       AST       Bacteria, UA       Baso #       Basophil%       Bilirubin (UA)       BILIRUBIN TOTAL       BNP       Site       BUN, Bld       Calcium       Chloride       CO2       Color, UA       Creatinine       DelSys       Differential Method       eGFR if        eGFR if non        Eos #       Eosinophil%       Flow       Flu A & B Source       Glucose       Glucose, UA       Gran # (ANC)       Gran%       Hematocrit       Hemoglobin       Hyaline Casts, UA       Immature Grans (Abs)       Immature Granulocytes       Ketones, UA       Lactate, Pepito       Leukocytes, UA       Lymph #       Lymph%       Magnesium       MCH       MCHC       MCV       Microscopic Comment       Mode       Mono #       Mono%       MPV       NITRITE UA       nRBC       Occult Blood UA       pH, UA       Platelets       POC BE       POC HCO3       POC PCO2       POC PH       POC PO2       POC SATURATED O2       POC TCO2       Potassium       PROTEIN TOTAL       Protein, UA       RBC       RBC, UA       RDW       Sample       SARS-CoV-2 RNA, Amplification, Qual Negative  Comment:  This test utilizes isothermal nucleic acid amplification   technology to detect the SARS-CoV-2 RdRp nucleic acid segment.   The analytical sensitivity (limit of detection) is 125 genome   equivalents/mL.   A POSITIVE result implies infection with the SARS-CoV-2 virus;  the patient is presumed to be contagious.    A NEGATIVE result means that SARS-CoV-2 nucleic acids are not  present above the limit of detection. It does not rule out the   possibility of COVID-19 and should not be the sole basis for   treatment decisions. If COVID-19 is strongly suspected based on  clinical and exposure history, re-testing should be considered.   This test is only for use under the Food and Drug   Administration s Emergency Use  Authorization (EUA).   Commercial kits are provided by [employer].   Performance characteristics of the EUA have been independently  verified by [facility] Department of  Pathology and Laboratory Medicine.   _________________________________________________________________  The ID NOW COVID-19 Letter of Authorization, along with the   authorized Fact Sheet for Healthcare Providers, the authorized Fact  Sheet for Patients, and authorized labeling are available on the FDA   website:  www.fda.gov/MedicalDevices/Safety/EmergencySituations/apn492173.htm       Sodium       Specific Gravity, UA       Specimen UA       UROBILINOGEN UA       WBC, UA       WBC           All pertinent labs within the past 24 hours have been reviewed.    Significant Imaging: I have reviewed and interpreted all pertinent imaging results/findings within the past 24 hours.

## 2020-04-29 NOTE — ED TRIAGE NOTES
"PT to ER via EMS, c/o "I've been feeling bad for a couple days with cough fever and congestion. My family talked me into coming here."   "

## 2020-04-29 NOTE — HOSPITAL COURSE
Santi iBrd MD   Nutrition Mirella Yoder, RD   Pharmacy Zay Jones, [facility]    - Clinical Sonya Palacios, [facility], Lottie Vences, [facility]   Transplant Janet White, Major Lima MD, Nuvia Johnson, ESTRELLA, Shwetha Almendarez LPN, Radha Hurley, ESTRELLA, Peter Adorno Evaluation emergency department today revealed influenza swab negative, blood cultures are pending, BNP 78  White blood cell count 01085 hemoglobin 11.6 hematocrit 35 differential showed 92 granulocytes to lymphocytes  Lactic acid 2.5 elevated  Magnesium 1.3  Sodium 129 potassium 3.8 glucose 226 BUN creatinine 22 and 1.0 and anion gap 12 in GFR greater than 60  COVID negative  Urinalysis showed specific gravity 1.0302+ protein trace occult blood    04/30/2020:  Patient states feeling much better.  Still having some cough but no fever chills nausea vomiting.  White blood cell count 64857, hemoglobin 11.7 hematocrit 35.2 platelets 181, and differential showed granulocytes 95 in lymphocytes 2.2  White blood cell count thought to be related to corticosteroids.  All troponin levels have been normal and CPK elevated at 879.  Lactic acid level 2.2.

## 2020-04-29 NOTE — ED NOTES
Attempted to call emergency contacts (daughters- Luma and Milena)  to update family on plan of care. Awaiting call back.

## 2020-04-29 NOTE — H&P
[facility] Medicine  History & Physical    Patient Name: Larry Lepley  MRN: [id number]  Admission Date: 4/29/2020  Attending Physician: Diego Montes MD  Primary Care Provider: [facility]         Patient information was obtained from patient and ER records.     Subjective:     Principal Problem:Shortness of breath    Chief Complaint:   Chief Complaint   Patient presents with    Cough        HPI: Patient is a 79-year-old male that presented to the emergency department complaining of increasing shortness of breath.  Patient also states he has had fever over the last 24 hr up to 102.  He states that he was having generalized myalgias arthralgias throughout the night along with fever.  He denies nausea and vomiting and states some fever and chills associated.  Patient states he was having increasing shortness of breath this morning and presented to the emergency department for evaluation.  Patient has past medical history significant for hypertension, gastroesophageal reflux disease, hearing deficit, and COPD.  Patient normally is a patient of the [facility] in [place].    Past Medical History:   Diagnosis Date    Hypertension        Past Surgical History:   Procedure Laterality Date    CARPAL TUNNEL RELEASE Bilateral     HIP ARTHROPLASTY Left        Review of patient's allergies indicates:   Allergen Reactions    Aspirin (bulk) Swelling    Sulfa (sulfonamide antibiotics)        No current facility-administered medications on file prior to encounter.      Current Outpatient Medications on File Prior to Encounter   Medication Sig    CYCLOBENZAPRINE HCL (FLEXERIL ORAL) Take by mouth. Pt is unsure of dose  But says he takes several a day    hydrocodone-acetaminophen 5-325mg (NORCO) 5-325 mg per tablet Take 1 tablet by mouth every 4 (four) hours as needed.    acetaminophen (TYLENOL) 500 MG tablet Take 2 tablets (1,000 mg total) by mouth  every 6 (six) hours as needed for Pain or Temperature greater than (or equal to 101 degree F).    LISINOPRIL ORAL Take 10 mg by mouth once daily.      Family History     Problem Relation (Age of Onset)    No Known Problems Mother, Father        Tobacco Use    Smoking status: Former Smoker     Last attempt to quit: 1997     Years since quittin.0    Smokeless tobacco: Never Used   Substance and Sexual Activity    Alcohol use: No    Drug use: No    Sexual activity: Not on file     Review of Systems   Constitutional: Positive for fatigue and fever. Negative for activity change and appetite change.   HENT: Positive for congestion and rhinorrhea. Negative for ear discharge, mouth sores, nosebleeds, sinus pressure, sinus pain and tinnitus.    Eyes: Negative.  Negative for pain, redness and itching.   Respiratory: Positive for shortness of breath and wheezing. Negative for apnea, cough, choking, chest tightness and stridor.    Cardiovascular: Positive for chest pain and leg swelling. Negative for palpitations.   Gastrointestinal: Negative for abdominal distention, abdominal pain, anal bleeding, blood in stool, constipation and diarrhea.   Endocrine: Negative.    Genitourinary: Negative for difficulty urinating, flank pain, frequency and urgency.   Musculoskeletal: Positive for arthralgias and myalgias. Negative for back pain and gait problem.   Skin: Negative for color change and pallor.   Allergic/Immunologic: Negative.    Neurological: Positive for dizziness, weakness and light-headedness. Negative for facial asymmetry and headaches.   Hematological: Negative for adenopathy. Does not bruise/bleed easily.   Psychiatric/Behavioral: The patient is nervous/anxious.    All other systems reviewed and are negative.    Objective:     Vital Signs (Most Recent):  Temp: (!) 102.4 °F (39.1 °C) (20 1550)  Pulse: (!) 113 (20 1550)  Resp: (!) 22 (20 1550)  BP: 120/84 (20 1550)  SpO2: 96 %  (04/29/20 1550) Vital Signs (24h Range):  Temp:  [102.2 °F (39 °C)-102.4 °F (39.1 °C)] 102.4 °F (39.1 °C)  Pulse:  [106-130] 113  Resp:  [20-34] 22  SpO2:  [88 %-99 %] 96 %  BP: (120-151)/() 120/84     Weight: 98.8 kg (217 lb 13 oz)  Body mass index is 34.11 kg/m².    Physical Exam   Constitutional: He is oriented to person, place, and time. He appears well-developed and well-nourished.   HENT:   Head: Normocephalic and atraumatic.   Eyes: Pupils are equal, round, and reactive to light. EOM are normal.   Neck: Normal range of motion. Neck supple. No tracheal deviation present. No thyromegaly present.   Cardiovascular: Normal rate, regular rhythm and normal heart sounds.   Pulmonary/Chest: Effort normal and breath sounds normal.   Abdominal: Soft. Bowel sounds are normal. He exhibits no distension. There is no tenderness. There is no rebound and no guarding.   Musculoskeletal: Normal range of motion.   Lymphadenopathy:     He has no cervical adenopathy.   Neurological: He is alert and oriented to person, place, and time.   Skin: Skin is warm and dry. Capillary refill takes less than 2 seconds.   Psychiatric: He has a normal mood and affect. His behavior is normal. Judgment and thought content normal.   Nursing note and vitals reviewed.        CRANIAL NERVES     CN III, IV, VI   Pupils are equal, round, and reactive to light.  Extraocular motions are normal.        Significant Labs:   Recent Lab Results       04/29/20  1448   04/29/20  1400   04/29/20  1335   04/29/20  1301   04/29/20  1259        Influenza A, Molecular     Negative         Influenza B, Molecular     Negative         Albumin         3.7     Alkaline Phosphatase         88     Allens Test Pass             ALT         17     Anion Gap         12     Appearance, UA       Clear       AST         21     Bacteria, UA       Few       Baso #   0.04           Basophil%   0.2           Bilirubin (UA)       Negative       BILIRUBIN TOTAL          1.3  Comment:  For infants and newborns, interpretation of results should be based  on gestational age, weight and in agreement with clinical  observations.  Premature Infant recommended reference ranges:  Up to 24 hours.............<8.0 mg/dL  Up to 48 hours............<12.0 mg/dL  3-5 days..................<15.0 mg/dL  6-29 days.................<15.0 mg/dL       BNP   78  Comment:  Values of less than 100 pg/ml are consistent with non-CHF populations.           Site RR             BUN, Bld         22     Calcium         8.3     Chloride         96     CO2         21     Color, UA       Yellow       Creatinine         1.0     DelSys Nasal Can             Differential Method   Automated           eGFR if          >60.0     eGFR if non          >60.0  Comment:  Calculation used to obtain the estimated glomerular filtration  rate (eGFR) is the CKD-EPI equation.        Eos #   0.0           Eosinophil%   0.0           Flow 3             Flu A & B Source     Nasal Swab         Glucose         226     Glucose, UA       Negative       Gran # (ANC)   17.3           Gran%   92.3           Hematocrit   35.0           Hemoglobin   11.6           Hyaline Casts, UA       0       Immature Grans (Abs)   0.12  Comment:  Mild elevation in immature granulocytes is non specific and   can be seen in a variety of conditions including stress response,   acute inflammation, trauma and pregnancy. Correlation with other   laboratory and clinical findings is essential.             Immature Granulocytes   0.6           Ketones, UA       Negative       Lactate, Pepito         2.5  Comment:  Falsely low lactic acid results can be found in samples   containing >=13.0 mg/dL total bilirubin and/or >=3.5 mg/dL   direct bilirubin.       Leukocytes, UA       Negative       Lymph #   0.4           Lymph%   2.2           Magnesium         1.3     MCH   30.3           MCHC   33.1           MCV   91           Microscopic  Comment       SEE COMMENT  Comment:  Other formed elements not mentioned in the report are not   present in the microscopic examination.          Mode SPONT             Mono #   0.9           Mono%   4.7           MPV   10.1           NITRITE UA       Negative       nRBC   0           Occult Blood UA       Trace       pH, UA       6.0       Platelets   185           POC BE -3             POC HCO3 21.0             POC PCO2 30.5             POC PH 7.446             POC PO2 67             POC SATURATED O2 94             POC TCO2 22             Potassium         3.8     PROTEIN TOTAL         7.0     Protein, UA       2+  Comment:  Recommend a 24 hour urine protein or a urine   protein/creatinine ratio if globulin induced proteinuria is  clinically suspected.         RBC   3.83           RBC, UA       2       RDW   13.6           Sample ARTERIAL             SARS-CoV-2 RNA, Amplification, Qual               Sodium         129     Specific Gravity, UA       >=1.030       Specimen UA       Urine, Clean Catch       UROBILINOGEN UA       Negative       WBC, UA       5       WBC   18.74                            04/29/20  1257        Influenza A, Molecular       Influenza B, Molecular       Albumin       Alkaline Phosphatase       Allens Test       ALT       Anion Gap       Appearance, UA       AST       Bacteria, UA       Baso #       Basophil%       Bilirubin (UA)       BILIRUBIN TOTAL       BNP       Site       BUN, Bld       Calcium       Chloride       CO2       Color, UA       Creatinine       DelSys       Differential Method       eGFR if        eGFR if non        Eos #       Eosinophil%       Flow       Flu A & B Source       Glucose       Glucose, UA       Gran # (ANC)       Gran%       Hematocrit       Hemoglobin       Hyaline Casts, UA       Immature Grans (Abs)       Immature Granulocytes       Ketones, UA       Lactate, Pepito       Leukocytes, UA       Lymph #       Lymph%        Magnesium       MCH       MCHC       MCV       Microscopic Comment       Mode       Mono #       Mono%       MPV       NITRITE UA       nRBC       Occult Blood UA       pH, UA       Platelets       POC BE       POC HCO3       POC PCO2       POC PH       POC PO2       POC SATURATED O2       POC TCO2       Potassium       PROTEIN TOTAL       Protein, UA       RBC       RBC, UA       RDW       Sample       SARS-CoV-2 RNA, Amplification, Qual Negative  Comment:  This test utilizes isothermal nucleic acid amplification   technology to detect the SARS-CoV-2 RdRp nucleic acid segment.   The analytical sensitivity (limit of detection) is 125 genome   equivalents/mL.   A POSITIVE result implies infection with the SARS-CoV-2 virus;  the patient is presumed to be contagious.    A NEGATIVE result means that SARS-CoV-2 nucleic acids are not  present above the limit of detection. It does not rule out the   possibility of COVID-19 and should not be the sole basis for   treatment decisions. If COVID-19 is strongly suspected based on  clinical and exposure history, re-testing should be considered.   This test is only for use under the Food and Drug   Administration s Emergency Use Authorization (EUA).   Commercial kits are provided by [employer].   Performance characteristics of the EUA have been independently  verified by [facility] Department of  Pathology and Laboratory Medicine.   _________________________________________________________________  The ID NOW COVID-19 Letter of Authorization, along with the   authorized Fact Sheet for Healthcare Providers, the authorized Fact  Sheet for Patients, and authorized labeling are available on the FDA   website:  www.fda.gov/MedicalDevices/Safety/EmergencySituations/rjb204598.htm       Sodium       Specific Gravity, UA       Specimen UA       UROBILINOGEN UA       WBC, UA       WBC           All pertinent labs within the past 24 hours have been  reviewed.    Significant Imaging: I have reviewed and interpreted all pertinent imaging results/findings within the past 24 hours.    Assessment/Plan:     Atypical pneumonia  04/29/2020:  · Begin empiric antibiotic therapy with ceftriaxone 1 g q.12 hours, and this azithromycin 500 mg IV daily  · Continue respiratory toilet with DuoNeb nebulizer treatments every 4 hr  · Incentive spirometry every 4 hr  · Repeat chest x-ray in the a.m..  · Guaifenesin 600 mg p.o. b.i.d.  · Monitor progression of symptoms or chest x-ray findings in the a.m..  · Repeat ABG in the a.m.  ·       Essential hypertension  04/29/2020:  · Continue current medications for blood pressure in lisinopril 10 mg p.o. daily  · Change or add to this regimen as needed      VTE Risk Mitigation (From admission, onward)         Ordered     enoxaparin injection 40 mg  Daily      04/29/20 1610     IP VTE HIGH RISK PATIENT  Once      04/29/20 1610     Place sequential compression device  Until discontinued      04/29/20 1610                   Diego Montes MD  Department of Hospital Medicine   [facility]

## 2020-04-29 NOTE — HPI
Patient is a 79-year-old male that presented to the emergency department complaining of increasing shortness of breath.  Patient also states he has had fever over the last 24 hr up to 102.  He states that he was having generalized myalgias arthralgias throughout the night along with fever.  He denies nausea and vomiting and states some fever and chills associated.  Patient states he was having increasing shortness of breath this morning and presented to the emergency department for evaluation.  Patient has past medical history significant for hypertension, gastroesophageal reflux disease, hearing deficit, and COPD.  Patient normally is a patient of the [facility] in [place].

## 2020-04-29 NOTE — NURSING
TO FLOOR VIA STRETCHER FROM ER. AAO X 3. ORIENTED TO ROOM. CALL LIGHT PLACED WITHIN REACH AND EXPLAINED. VERBALIZED UNDERSTANDING. BED IN LOW AND LOCKED POSITION WITH SIDE RAILS ^ X 2. BED ALRM ACTIVATED AND TELE SITTER IN USE.

## 2020-04-29 NOTE — ED NOTES
Luma, daughter of patient, called back for update. Updated daughter on plan of care. No questions or concerns at this time.

## 2020-04-29 NOTE — ASSESSMENT & PLAN NOTE
04/29/2020:  · Continue current medications for blood pressure in lisinopril 10 mg p.o. daily  · Change or add to this regimen as needed

## 2020-04-29 NOTE — ED PROVIDER NOTES
Encounter Date: 2020       History     Chief Complaint   Patient presents with    Cough     Patient is brought in for shortness of breath.  [facility] was called due to increasing shortness of breath which started last evening.  Was given a aerosol treatment on the way here and had marked improvement in his breathing.  His initial O2 sat at the scene was 88%.  This increased to 95% on 4 L of nasal O2.    Patient is febrile 102.  6 fever.  He has also had cough.  No chills or sweats.  He is very hard of hearing.  Has history of diabetes.    On arrival patient's blood pressure was 160/110.  He was given nitroglycerin sublingual.  This had marked improvement in his breathing.    Chest x-ray shows mild interstitial changes, COVID is negative.        Review of patient's allergies indicates:   Allergen Reactions    Aspirin (bulk) Swelling    Sulfa (sulfonamide antibiotics)      Past Medical History:   Diagnosis Date    Hypertension      Past Surgical History:   Procedure Laterality Date    CARPAL TUNNEL RELEASE Bilateral     HIP ARTHROPLASTY Left      Family History   Problem Relation Age of Onset    No Known Problems Mother     No Known Problems Father      Social History     Tobacco Use    Smoking status: Former Smoker     Last attempt to quit: 1997     Years since quittin.0    Smokeless tobacco: Never Used   Substance Use Topics    Alcohol use: No    Drug use: No     Review of Systems   Constitutional: Positive for fever.   HENT: Negative for sore throat.    Respiratory: Positive for cough and shortness of breath.    Cardiovascular: Negative for chest pain.   Gastrointestinal: Negative for nausea.   Genitourinary: Negative for dysuria.   Musculoskeletal: Negative for back pain.   Skin: Negative for rash.   Neurological: Negative for weakness.   Hematological: Does not bruise/bleed easily.   All other systems reviewed and are negative.      Physical Exam     Initial Vitals [20 1250]   BP Pulse  Resp Temp SpO2   126/85 (!) 126 (!) 24 (!) 102.3 °F (39.1 °C) (!) 91 %      MAP       --         Physical Exam    Nursing note and vitals reviewed.  Constitutional: Vital signs are normal. He appears well-developed and well-nourished. He is active and cooperative.   HENT:   Head: Normocephalic and atraumatic.   Right Ear: External ear normal.   Left Ear: External ear normal.   Nose: Nose normal.   Mouth/Throat: Oropharynx is clear and moist.   Eyes: Conjunctivae and lids are normal. Lids are everted and swept, no foreign bodies found.   Neck: Trachea normal, normal range of motion and full passive range of motion without pain. Neck supple.   Cardiovascular: Normal rate, regular rhythm, S1 normal, S2 normal, intact distal pulses and normal pulses.  No extrasystoles are present.  Exam reveals gallop.    Pulmonary/Chest: He has wheezes.   Abdominal: Soft. Normal appearance and bowel sounds are normal.   Musculoskeletal: Normal range of motion.   Neurological: He is alert. He has normal reflexes. GCS eye subscore is 4. GCS verbal subscore is 5. GCS motor subscore is 6.   Skin: Skin is warm, dry and intact. Capillary refill takes less than 2 seconds.   Psychiatric: He has a normal mood and affect. His speech is normal and behavior is normal. Cognition and memory are normal.         ED Course   Procedures  Labs Reviewed   CBC W/ AUTO DIFFERENTIAL - Abnormal; Notable for the following components:       Result Value    WBC 18.74 (*)     RBC 3.83 (*)     Hemoglobin 11.6 (*)     Hematocrit 35.0 (*)     Immature Granulocytes 0.6 (*)     Gran # (ANC) 17.3 (*)     Immature Grans (Abs) 0.12 (*)     Lymph # 0.4 (*)     Gran% 92.3 (*)     Lymph% 2.2 (*)     All other components within normal limits    Narrative:     Recoll. [id number] by [facility] at 04/29/2020 13:23, reason: clotted   COMPREHENSIVE METABOLIC PANEL - Abnormal; Notable for the following components:    Sodium 129 (*)     CO2 21 (*)     Glucose 226 (*)     Calcium 8.3 (*)      Total Bilirubin 1.3 (*)     All other components within normal limits   LACTIC ACID, PLASMA - Abnormal; Notable for the following components:    Lactate (Lactic Acid) 2.5 (*)     All other components within normal limits   URINALYSIS, REFLEX TO URINE CULTURE - Abnormal; Notable for the following components:    Specific Gravity, UA >=1.030 (*)     Protein, UA 2+ (*)     Occult Blood UA Trace (*)     All other components within normal limits    Narrative:     Preferred Collection Type->Urine, Clean Catch   MAGNESIUM - Abnormal; Notable for the following components:    Magnesium 1.3 (*)     All other components within normal limits   URINALYSIS MICROSCOPIC - Abnormal; Notable for the following components:    Bacteria Few (*)     All other components within normal limits    Narrative:     Preferred Collection Type->Urine, Clean Catch   INFLUENZA A & B BY MOLECULAR   CULTURE, BLOOD   B-TYPE NATRIURETIC PEPTIDE    Narrative:     Recoll. [id number] by [email or web address] at 04/29/2020 13:26, reason: clotted   SARS-COV-2 RNA AMPLIFICATION, QUAL    Narrative:     What symptom criteria does the patient meet?->Fever  What symptom criteria does the patient meet?->Cough  What symptom criteria does the patient meet?->Shortness of  breath or difficulty breathing     EKG Readings: (Independently Interpreted)   Initial Reading: No STEMI. Rhythm: Sinus Tachycardia. Ectopy: No Ectopy. Conduction: Normal. ST Segments: Normal ST Segments. T Waves: Normal. Axis: Normal. Clinical Impression: Sinus Tachycardia     ECG Results          EKG 12-lead (In process)  Result time 04/29/20 13:10:59    In process by Interface, Lab In [facility] (04/29/20 13:10:59)                 Narrative:    Test Reason : R06.02,    Vent. Rate : 118 BPM     Atrial Rate : 118 BPM     P-R Int : 164 ms          QRS Dur : 074 ms      QT Int : 326 ms       P-R-T Axes : 041 010 043 degrees     QTc Int : 456 ms    Sinus tachycardia  Otherwise normal ECG  No previous ECGs  available    Referred By:  BERNABE ROJAS           Confirmed By:                             Imaging Results          X-Ray Chest AP Portable (Final result)  Result time 04/29/20 13:47:26    Final result by Tessa Leon MD (04/29/20 13:47:26)                 Impression:      Cardiomegaly.  Mild nonspecific interstitial prominence.      Electronically signed by: Tessa Leon  Date:    04/29/2020  Time:    13:47             Narrative:    EXAMINATION:  XR CHEST AP PORTABLE    CLINICAL HISTORY:  Shortness of breath;    TECHNIQUE:  Single frontal view of the chest was performed.    COMPARISON:  07/29/2017    FINDINGS:  Portable AP semi upright view of the chest was obtained.  Lung volumes are lower.  Mild nonspecific prominence of the interstitial markings in the mid and lower lung zones is likely on the basis of vascular crowding and minimal atelectasis.  However, minimal interstitial edema, bronchitis, viral pneumonia could have a similar appearance.  There is no focal consolidation.  The heart is mildly enlarged.  No pleural effusion.                              X-Rays:   Independently Interpreted Readings:   Chest X-Ray: Cardiomegaly present.  Increased vascular markings consistent with CHF are present. Interstitial changes     Medical Decision Making:   Clinical Tests:   Lab Tests: Ordered and Reviewed  The following lab test(s) were unremarkable: CBC, CMP and BNP       <> Summary of Lab: Patient's white count 34806, BNP is normal, sodium is 129, glucose is 223, lactate is 2.5, remaining labs were normal.  COVID is negative.  Radiological Study: Ordered and Reviewed  Medical Tests: Reviewed and Ordered  ED Management:  Chest x-ray shows mild cardiomegaly and some interstitial changes.  EKG shows sinus tachycardia.  COVID is negative.    Patient was given respiratory therapy, Rocephin, initially nitroglycerin with the elevated blood pressure and shortness of breath, his blood pressure remained normal, after  nitroglycerin.,    Patient is given Rocephin with a 18,000 white count, and elevated lactic acid.  He was not given fluid bolus due to the question of possible CHF.  Blood pressure remained normal to elevated.    Patient is admitted for treatment of pneumonia and respiratory support.                   ED Course as of Apr 29 1440 Wed Apr 29, 2020   1438 RBC, UA: 2 [SO]      ED Course User Index  [SO] Kulwant Traore MD                Clinical Impression:       ICD-10-CM ICD-9-CM   1. Pneumonia due to infectious organism, unspecified laterality, unspecified part of lung J18.9 136.9     484.8   2. Shortness of breath R06.02 786.05         Disposition:   Disposition: Admitted  Condition: Fair                        Kulwant Traore MD  04/29/20 1446

## 2020-04-30 LAB
ANION GAP SERPL CALC-SCNC: 11 MMOL/L (ref 8–16)
BASOPHILS # BLD AUTO: 0.01 K/UL (ref 0–0.2)
BASOPHILS NFR BLD: 0.1 % (ref 0–1.9)
BUN SERPL-MCNC: 27 MG/DL (ref 8–23)
CALCIUM SERPL-MCNC: 8 MG/DL (ref 8.7–10.5)
CHLORIDE SERPL-SCNC: 98 MMOL/L (ref 95–110)
CK MB SERPL-MCNC: 5.2 NG/ML (ref 0.1–6.5)
CK MB SERPL-RTO: 0.6 % (ref 0–5)
CK SERPL-CCNC: 879 U/L (ref 20–200)
CO2 SERPL-SCNC: 21 MMOL/L (ref 23–29)
CREAT SERPL-MCNC: 1.2 MG/DL (ref 0.5–1.4)
DIFFERENTIAL METHOD: ABNORMAL
EOSINOPHIL # BLD AUTO: 0 K/UL (ref 0–0.5)
EOSINOPHIL NFR BLD: 0 % (ref 0–8)
ERYTHROCYTE [DISTWIDTH] IN BLOOD BY AUTOMATED COUNT: 13.9 % (ref 11.5–14.5)
EST. GFR  (AFRICAN AMERICAN): >60 ML/MIN/1.73 M^2
EST. GFR  (NON AFRICAN AMERICAN): 58 ML/MIN/1.73 M^2
GLUCOSE SERPL-MCNC: 269 MG/DL (ref 70–110)
HCT VFR BLD AUTO: 35.2 % (ref 40–54)
HGB BLD-MCNC: 11.7 G/DL (ref 14–18)
IMM GRANULOCYTES # BLD AUTO: 0.16 K/UL (ref 0–0.04)
IMM GRANULOCYTES NFR BLD AUTO: 1 % (ref 0–0.5)
LYMPHOCYTES # BLD AUTO: 0.3 K/UL (ref 1–4.8)
LYMPHOCYTES NFR BLD: 2.2 % (ref 18–48)
MCH RBC QN AUTO: 30.5 PG (ref 27–31)
MCHC RBC AUTO-ENTMCNC: 33.2 G/DL (ref 32–36)
MCV RBC AUTO: 92 FL (ref 82–98)
MONOCYTES # BLD AUTO: 0.2 K/UL (ref 0.3–1)
MONOCYTES NFR BLD: 1.2 % (ref 4–15)
NEUTROPHILS # BLD AUTO: 14.7 K/UL (ref 1.8–7.7)
NEUTROPHILS NFR BLD: 95.5 % (ref 38–73)
NRBC BLD-RTO: 0 /100 WBC
PLATELET # BLD AUTO: 181 K/UL (ref 150–350)
PMV BLD AUTO: 10.6 FL (ref 9.2–12.9)
POTASSIUM SERPL-SCNC: 4 MMOL/L (ref 3.5–5.1)
RBC # BLD AUTO: 3.84 M/UL (ref 4.6–6.2)
SODIUM SERPL-SCNC: 130 MMOL/L (ref 136–145)
TROPONIN I SERPL DL<=0.01 NG/ML-MCNC: 0.05 NG/ML (ref 0.02–0.5)
TROPONIN I SERPL DL<=0.01 NG/ML-MCNC: 0.07 NG/ML (ref 0.02–0.5)
WBC # BLD AUTO: 15.36 K/UL (ref 3.9–12.7)

## 2020-04-30 PROCEDURE — 99233 PR SUBSEQUENT HOSPITAL CARE,LEVL III: ICD-10-PCS | Mod: ,,, | Performed by: INTERNAL MEDICINE

## 2020-04-30 PROCEDURE — 25000242 PHARM REV CODE 250 ALT 637 W/ HCPCS: Performed by: INTERNAL MEDICINE

## 2020-04-30 PROCEDURE — 63600175 PHARM REV CODE 636 W HCPCS: Performed by: INTERNAL MEDICINE

## 2020-04-30 PROCEDURE — C9113 INJ PANTOPRAZOLE SODIUM, VIA: HCPCS | Performed by: INTERNAL MEDICINE

## 2020-04-30 PROCEDURE — 84484 ASSAY OF TROPONIN QUANT: CPT

## 2020-04-30 PROCEDURE — 94640 AIRWAY INHALATION TREATMENT: CPT

## 2020-04-30 PROCEDURE — 82553 CREATINE MB FRACTION: CPT

## 2020-04-30 PROCEDURE — 84484 ASSAY OF TROPONIN QUANT: CPT | Mod: 91

## 2020-04-30 PROCEDURE — 27000221 HC OXYGEN, UP TO 24 HOURS

## 2020-04-30 PROCEDURE — 94761 N-INVAS EAR/PLS OXIMETRY MLT: CPT

## 2020-04-30 PROCEDURE — 85025 COMPLETE CBC W/AUTO DIFF WBC: CPT

## 2020-04-30 PROCEDURE — 82550 ASSAY OF CK (CPK): CPT

## 2020-04-30 PROCEDURE — 87040 BLOOD CULTURE FOR BACTERIA: CPT

## 2020-04-30 PROCEDURE — 99233 SBSQ HOSP IP/OBS HIGH 50: CPT | Mod: ,,, | Performed by: INTERNAL MEDICINE

## 2020-04-30 PROCEDURE — 11000001 HC ACUTE MED/SURG PRIVATE ROOM

## 2020-04-30 PROCEDURE — 80048 BASIC METABOLIC PNL TOTAL CA: CPT

## 2020-04-30 PROCEDURE — 25000003 PHARM REV CODE 250: Performed by: INTERNAL MEDICINE

## 2020-04-30 PROCEDURE — 36415 COLL VENOUS BLD VENIPUNCTURE: CPT

## 2020-04-30 RX ORDER — GUAIFENESIN 600 MG/1
600 TABLET, EXTENDED RELEASE ORAL 2 TIMES DAILY
Status: DISCONTINUED | OUTPATIENT
Start: 2020-04-30 | End: 2020-05-01 | Stop reason: HOSPADM

## 2020-04-30 RX ADMIN — METHYLPREDNISOLONE SODIUM SUCCINATE 80 MG: 40 INJECTION, POWDER, FOR SOLUTION INTRAMUSCULAR; INTRAVENOUS at 05:04

## 2020-04-30 RX ADMIN — SODIUM CHLORIDE: 0.9 INJECTION, SOLUTION INTRAVENOUS at 05:04

## 2020-04-30 RX ADMIN — IPRATROPIUM BROMIDE AND ALBUTEROL SULFATE 3 ML: .5; 3 SOLUTION RESPIRATORY (INHALATION) at 07:04

## 2020-04-30 RX ADMIN — HYDROCODONE BITARTRATE AND ACETAMINOPHEN 1 TABLET: 5; 325 TABLET ORAL at 07:04

## 2020-04-30 RX ADMIN — PANTOPRAZOLE SODIUM 40 MG: 40 INJECTION, POWDER, FOR SOLUTION INTRAVENOUS at 08:04

## 2020-04-30 RX ADMIN — IPRATROPIUM BROMIDE AND ALBUTEROL SULFATE 3 ML: .5; 3 SOLUTION RESPIRATORY (INHALATION) at 11:04

## 2020-04-30 RX ADMIN — METHYLPREDNISOLONE SODIUM SUCCINATE 80 MG: 40 INJECTION, POWDER, FOR SOLUTION INTRAMUSCULAR; INTRAVENOUS at 09:04

## 2020-04-30 RX ADMIN — IPRATROPIUM BROMIDE AND ALBUTEROL SULFATE 3 ML: .5; 3 SOLUTION RESPIRATORY (INHALATION) at 02:04

## 2020-04-30 RX ADMIN — METHYLPREDNISOLONE SODIUM SUCCINATE 80 MG: 40 INJECTION, POWDER, FOR SOLUTION INTRAMUSCULAR; INTRAVENOUS at 02:04

## 2020-04-30 RX ADMIN — CEFTRIAXONE 1 G: 1 INJECTION, SOLUTION INTRAVENOUS at 02:04

## 2020-04-30 RX ADMIN — GUAIFENESIN 600 MG: 600 TABLET, EXTENDED RELEASE ORAL at 08:04

## 2020-04-30 RX ADMIN — HYDROCODONE BITARTRATE AND ACETAMINOPHEN 1 TABLET: 5; 325 TABLET ORAL at 08:04

## 2020-04-30 RX ADMIN — CEFTRIAXONE 1 G: 1 INJECTION, SOLUTION INTRAVENOUS at 01:04

## 2020-04-30 RX ADMIN — ENOXAPARIN SODIUM 40 MG: 100 INJECTION SUBCUTANEOUS at 04:04

## 2020-04-30 NOTE — PROGRESS NOTES
[facility] Medicine  Progress Note    Patient Name: Larry Lepley  MRN: [id number]  Patient Class: IP- Inpatient   Admission Date: 4/29/2020  Length of Stay: 1 days  Attending Physician: Diego Montes MD  Primary Care Provider: [facility]        Subjective:     Principal Problem:Shortness of breath        HPI:  Patient is a 79-year-old male that presented to the emergency department complaining of increasing shortness of breath.  Patient also states he has had fever over the last 24 hr up to 102.  He states that he was having generalized myalgias arthralgias throughout the night along with fever.  He denies nausea and vomiting and states some fever and chills associated.  Patient states he was having increasing shortness of breath this morning and presented to the emergency department for evaluation.  Patient has past medical history significant for hypertension, gastroesophageal reflux disease, hearing deficit, and COPD.  Patient normally is a patient of the [facility] in [place].    Overview/Hospital Course:  Evaluation emergency department today revealed influenza swab negative, blood cultures are pending, BNP 78  White blood cell count 64045 hemoglobin 11.6 hematocrit 35 differential showed 92 granulocytes to lymphocytes  Lactic acid 2.5 elevated  Magnesium 1.3  Sodium 129 potassium 3.8 glucose 226 BUN creatinine 22 and 1.0 and anion gap 12 in GFR greater than 60  COVID negative  Urinalysis showed specific gravity 1.0302+ protein trace occult blood    04/30/2020:  Patient states feeling much better.  Still having some cough but no fever chills nausea vomiting.  White blood cell count 45280, hemoglobin 11.7 hematocrit 35.2 platelets 181, and differential showed granulocytes 95 in lymphocytes 2.2  White blood cell count thought to be related to corticosteroids.  All troponin levels have been normal and CPK elevated at 879.  Lactic acid  level 2.2.      Interval History:     Review of Systems   Constitutional: Positive for fever. Negative for activity change, appetite change and fatigue.   HENT: Positive for hearing loss. Negative for congestion, ear discharge, mouth sores, nosebleeds, rhinorrhea, sinus pressure, sinus pain and tinnitus.    Eyes: Negative.  Negative for pain, redness and itching.   Respiratory: Positive for cough, shortness of breath and wheezing. Negative for apnea, choking, chest tightness and stridor.    Cardiovascular: Negative for chest pain, palpitations and leg swelling.   Gastrointestinal: Negative for abdominal distention, abdominal pain, anal bleeding, blood in stool, constipation and diarrhea.   Endocrine: Negative.    Genitourinary: Negative for difficulty urinating, flank pain, frequency and urgency.   Musculoskeletal: Negative for arthralgias, back pain, gait problem and myalgias.   Skin: Negative for color change and pallor.   Allergic/Immunologic: Negative.    Neurological: Positive for weakness. Negative for dizziness, facial asymmetry, light-headedness and headaches.   Hematological: Negative for adenopathy. Does not bruise/bleed easily.   Psychiatric/Behavioral: The patient is nervous/anxious.      Objective:     Vital Signs (Most Recent):  Temp: 96.7 °F (35.9 °C) (04/30/20 1047)  Pulse: 80 (04/30/20 1047)  Resp: 20 (04/30/20 1047)  BP: (!) 96/59 (04/30/20 1047)  SpO2: 99 % (04/30/20 1047) Vital Signs (24h Range):  Temp:  [96.7 °F (35.9 °C)-102.4 °F (39.1 °C)] 96.7 °F (35.9 °C)  Pulse:  [] 80  Resp:  [16-34] 20  SpO2:  [88 %-99 %] 99 %  BP: ()/() 96/59     Weight: 98.8 kg (217 lb 13 oz)  Body mass index is 34.11 kg/m².    Intake/Output Summary (Last 24 hours) at 4/30/2020 1150  Last data filed at 4/30/2020 1100  Gross per 24 hour   Intake 1531.25 ml   Output 1625 ml   Net -93.75 ml      Physical Exam   Constitutional: He is oriented to person, place, and time. He appears well-developed and  well-nourished.   HENT:   Head: Normocephalic and atraumatic.   Eyes: Pupils are equal, round, and reactive to light. EOM are normal.   Neck: Normal range of motion. Neck supple. No tracheal deviation present. No thyromegaly present.   Cardiovascular: Normal rate, regular rhythm, normal heart sounds and intact distal pulses.   Pulmonary/Chest: He is in respiratory distress. He has wheezes. He has rales.   Abdominal: Soft. Bowel sounds are normal. He exhibits no distension. There is no tenderness. There is no rebound and no guarding.   Musculoskeletal: Normal range of motion.   Lymphadenopathy:     He has no cervical adenopathy.   Neurological: He is alert and oriented to person, place, and time.   Skin: Skin is warm and dry. Capillary refill takes less than 2 seconds.   Psychiatric: He has a normal mood and affect. His behavior is normal. Judgment and thought content normal.   Nursing note and vitals reviewed.      Significant Labs:   Recent Lab Results       04/30/20  0606   04/30/20  0104   04/29/20  1801   04/29/20  1448   04/29/20  1400        Influenza A, Molecular               Influenza B, Molecular               Albumin               Alkaline Phosphatase               Allens Test       Pass       ALT               Anion Gap     11         Appearance, UA               AST               Bacteria, UA               Baso # 0.01       0.04     Basophil% 0.1       0.2     Bilirubin (UA)               BILIRUBIN TOTAL               Blood Culture, Routine         No Growth to date[P]     BNP         78  Comment:  Values of less than 100 pg/ml are consistent with non-CHF populations.     Site       RR       BUN, Bld     23         Calcium     8.0         Chloride     94         CO2     24         Color, UA               CPK   879 291         CPK MB   5.2 2.5         Creatinine     1.3         DelSys       Nasal Can       Differential Method Automated       Automated     eGFR if      >60.0         eGFR  if non      52.6  Comment:  Calculation used to obtain the estimated glomerular filtration  rate (eGFR) is the CKD-EPI equation.            Eos # 0.0       0.0     Eosinophil% 0.0       0.0     Flow       3       Flu A & B Source               Glucose     192         Glucose, UA               Gran # (ANC) 14.7       17.3     Gran% 95.5       92.3     Hematocrit 35.2       35.0     Hemoglobin 11.7       11.6     Hyaline Casts, UA               Immature Grans (Abs) 0.16  Comment:  Mild elevation in immature granulocytes is non specific and   can be seen in a variety of conditions including stress response,   acute inflammation, trauma and pregnancy. Correlation with other   laboratory and clinical findings is essential.         0.12  Comment:  Mild elevation in immature granulocytes is non specific and   can be seen in a variety of conditions including stress response,   acute inflammation, trauma and pregnancy. Correlation with other   laboratory and clinical findings is essential.       Immature Granulocytes 1.0       0.6     Ketones, UA               Lactate, Pepito     2.2  Comment:  Falsely low lactic acid results can be found in samples   containing >=13.0 mg/dL total bilirubin and/or >=3.5 mg/dL   direct bilirubin.           Leukocytes, UA               Lymph # 0.3       0.4     Lymph% 2.2       2.2     Magnesium               MB%   0.6  Comment:  To be positive, the MB% must be greater than 5% AND the CK-MB  greater than 6.5 ng/mL. Values not in the reference interval,   but not qualifying as positive, should be considered   &quot;trace&quot;.   0.9  Comment:  To be positive, the MB% must be greater than 5% AND the CK-MB  greater than 6.5 ng/mL. Values not in the reference interval,   but not qualifying as positive, should be considered   &quot;trace&quot;.           MCH 30.5       30.3     MCHC 33.2       33.1     MCV 92       91     Microscopic Comment               Mode       SPONT       Mono #  0.2       0.9     Mono% 1.2       4.7     MPV 10.6       10.1     NITRITE UA               nRBC 0       0     Occult Blood UA               pH, UA               Platelets 181       185     POC BE       -3       POC HCO3       21.0       POC PCO2       30.5       POC PH       7.446       POC PO2       67       POC SATURATED O2       94       POC TCO2       22       Potassium     3.5         PROTEIN TOTAL               Protein, UA               RBC 3.84       3.83     RBC, UA               RDW 13.9       13.6     Sample       ARTERIAL       SARS-CoV-2 RNA, Amplification, Qual               Sodium     129         Specific [place], UA               Specimen UA               Troponin I 0.05 0.07 0.07         UROBILINOGEN UA               WBC, UA               WBC 15.36       18.74                      04/29/20  1335   04/29/20  1301   04/29/20  1259   04/29/20  1257        Influenza A, Molecular Negative           Influenza B, Molecular Negative           Albumin     3.7       Alkaline Phosphatase     88       Allens Test             ALT     17       Anion Gap     12       Appearance, UA   Clear         AST     21       Bacteria, UA   Few         Baso #             Basophil%             Bilirubin (UA)   Negative         BILIRUBIN TOTAL     1.3  Comment:  For infants and newborns, interpretation of results should be based  on gestational age, weight and in agreement with clinical  observations.  Premature Infant recommended reference ranges:  Up to 24 hours.............<8.0 mg/dL  Up to 48 hours............<12.0 mg/dL  3-5 days..................<15.0 mg/dL  6-29 days.................<15.0 mg/dL         Blood Culture, Routine             BNP             Site             BUN, Bld     22       Calcium     8.3       Chloride     96       CO2     21       Color, UA   Yellow         CPK             CPK MB             Creatinine     1.0       DelSys             Differential Method             eGFR if      >60.0        eGFR if non      >60.0  Comment:  Calculation used to obtain the estimated glomerular filtration  rate (eGFR) is the CKD-EPI equation.          Eos #             Eosinophil%             Flow             Flu A & B Source Nasal Swab           Glucose     226       Glucose, UA   Negative         Gran # (ANC)             Gran%             Hematocrit             Hemoglobin             Hyaline Casts, UA   0         Immature Grans (Abs)             Immature Granulocytes             Ketones, UA   Negative         Lactate, Pepito     2.5  Comment:  Falsely low lactic acid results can be found in samples   containing >=13.0 mg/dL total bilirubin and/or >=3.5 mg/dL   direct bilirubin.         Leukocytes, UA   Negative         Lymph #             Lymph%             Magnesium     1.3       MB%             MCH             MCHC             MCV             Microscopic Comment   SEE COMMENT  Comment:  Other formed elements not mentioned in the report are not   present in the microscopic examination.            Mode             Mono #             Mono%             MPV             NITRITE UA   Negative         nRBC             Occult Blood UA   Trace         pH, UA   6.0         Platelets             POC BE             POC HCO3             POC PCO2             POC PH             POC PO2             POC SATURATED O2             POC TCO2             Potassium     3.8       PROTEIN TOTAL     7.0       Protein, UA   2+  Comment:  Recommend a 24 hour urine protein or a urine   protein/creatinine ratio if globulin induced proteinuria is  clinically suspected.           RBC             RBC, UA   2         RDW             Sample             SARS-CoV-2 RNA, Amplification, Qual       Negative  Comment:  This test utilizes isothermal nucleic acid amplification   technology to detect the SARS-CoV-2 RdRp nucleic acid segment.   The analytical sensitivity (limit of detection) is 125 genome   equivalents/mL.   A POSITIVE result  implies infection with the SARS-CoV-2 virus;  the patient is presumed to be contagious.    A NEGATIVE result means that SARS-CoV-2 nucleic acids are not  present above the limit of detection. It does not rule out the   possibility of COVID-19 and should not be the sole basis for   treatment decisions. If COVID-19 is strongly suspected based on  clinical and exposure history, re-testing should be considered.   This test is only for use under the Food and Drug   Administration s Emergency Use Authorization (EUA).   Commercial kits are provided by [employer].   Performance characteristics of the EUA have been independently  verified by [facility] Department of  Pathology and Laboratory Medicine.   _________________________________________________________________  The ID NOW COVID-19 Letter of Authorization, along with the   authorized Fact Sheet for Healthcare Providers, the authorized Fact  Sheet for Patients, and authorized labeling are available on the FDA   website:  www.fda.gov/MedicalDevices/Safety/EmergencySituations/vsj676482.htm       Sodium     129       Specific Gravity, UA   >=1.030         Specimen UA   Urine, Clean Catch         Troponin I             UROBILINOGEN UA   Negative         WBC, UA   5         WBC                 All pertinent labs within the past 24 hours have been reviewed.    Significant Imaging: I have reviewed and interpreted all pertinent imaging results/findings within the past 24 hours.      Assessment/Plan:      Atypical pneumonia  04/29/2020:  · Begin empiric antibiotic therapy with ceftriaxone 1 g q.12 hours, and this azithromycin 500 mg IV daily  · Continue respiratory toilet with DuoNeb nebulizer treatments every 4 hr  · Incentive spirometry every 4 hr  · Repeat chest x-ray in the a.m..  · Guaifenesin 600 mg p.o. b.i.d.  · Monitor progression of symptoms or chest x-ray findings in the a.m..  · Repeat ABG in the a.m    04/30/2020  Continue current  antibiotics.  Repeat labs in the a.m. to include CBC and BMP  Continue incentive spirometry  Add guaifenesin 600 mg p.o. b.i.d.  Follow-up otherwise as needed based on clinical response  Begin tapering corticosteroids today.        Essential hypertension  04/29/2020:  · Continue current medications for blood pressure in lisinopril 10 mg p.o. daily  · Change or add to this regimen as needed    04/30/2020 continue current medications for blood pressure control  Blood pressure is under continue his control and and patient has been afebrile.  No other significant findings on vital signs.      VTE Risk Mitigation (From admission, onward)         Ordered     enoxaparin injection 40 mg  Daily      04/29/20 1610     IP VTE HIGH RISK PATIENT  Once      04/29/20 1610     Place sequential compression device  Until discontinued      04/29/20 1610                      Diego Montes MD  Department of Hospital Medicine   [facility]

## 2020-04-30 NOTE — ASSESSMENT & PLAN NOTE
04/29/2020:  · Continue current medications for blood pressure in lisinopril 10 mg p.o. daily  · Change or add to this regimen as needed    04/30/2020 continue current medications for blood pressure control  Blood pressure is under continue his control and and patient has been afebrile.  No other significant findings on vital signs.

## 2020-04-30 NOTE — SUBJECTIVE & OBJECTIVE
Interval History:     Review of Systems   Constitutional: Positive for fever. Negative for activity change, appetite change and fatigue.   HENT: Positive for hearing loss. Negative for congestion, ear discharge, mouth sores, nosebleeds, rhinorrhea, sinus pressure, sinus pain and tinnitus.    Eyes: Negative.  Negative for pain, redness and itching.   Respiratory: Positive for cough, shortness of breath and wheezing. Negative for apnea, choking, chest tightness and stridor.    Cardiovascular: Negative for chest pain, palpitations and leg swelling.   Gastrointestinal: Negative for abdominal distention, abdominal pain, anal bleeding, blood in stool, constipation and diarrhea.   Endocrine: Negative.    Genitourinary: Negative for difficulty urinating, flank pain, frequency and urgency.   Musculoskeletal: Negative for arthralgias, back pain, gait problem and myalgias.   Skin: Negative for color change and pallor.   Allergic/Immunologic: Negative.    Neurological: Positive for weakness. Negative for dizziness, facial asymmetry, light-headedness and headaches.   Hematological: Negative for adenopathy. Does not bruise/bleed easily.   Psychiatric/Behavioral: The patient is nervous/anxious.      Objective:     Vital Signs (Most Recent):  Temp: 96.7 °F (35.9 °C) (04/30/20 1047)  Pulse: 80 (04/30/20 1047)  Resp: 20 (04/30/20 1047)  BP: (!) 96/59 (04/30/20 1047)  SpO2: 99 % (04/30/20 1047) Vital Signs (24h Range):  Temp:  [96.7 °F (35.9 °C)-102.4 °F (39.1 °C)] 96.7 °F (35.9 °C)  Pulse:  [] 80  Resp:  [16-34] 20  SpO2:  [88 %-99 %] 99 %  BP: ()/() 96/59     Weight: 98.8 kg (217 lb 13 oz)  Body mass index is 34.11 kg/m².    Intake/Output Summary (Last 24 hours) at 4/30/2020 1150  Last data filed at 4/30/2020 1100  Gross per 24 hour   Intake 1531.25 ml   Output 1625 ml   Net -93.75 ml      Physical Exam   Constitutional: He is oriented to person, place, and time. He appears well-developed and well-nourished.    HENT:   Head: Normocephalic and atraumatic.   Eyes: Pupils are equal, round, and reactive to light. EOM are normal.   Neck: Normal range of motion. Neck supple. No tracheal deviation present. No thyromegaly present.   Cardiovascular: Normal rate, regular rhythm, normal heart sounds and intact distal pulses.   Pulmonary/Chest: He is in respiratory distress. He has wheezes. He has rales.   Abdominal: Soft. Bowel sounds are normal. He exhibits no distension. There is no tenderness. There is no rebound and no guarding.   Musculoskeletal: Normal range of motion.   Lymphadenopathy:     He has no cervical adenopathy.   Neurological: He is alert and oriented to person, place, and time.   Skin: Skin is warm and dry. Capillary refill takes less than 2 seconds.   Psychiatric: He has a normal mood and affect. His behavior is normal. Judgment and thought content normal.   Nursing note and vitals reviewed.      Significant Labs:   Recent Lab Results       04/30/20  0606   04/30/20  0104   04/29/20  1801   04/29/20  1448   04/29/20  1400        Influenza A, Molecular               Influenza B, Molecular               Albumin               Alkaline Phosphatase               Allens Test       Pass       ALT               Anion Gap     11         Appearance, UA               AST               Bacteria, UA               Baso # 0.01       0.04     Basophil% 0.1       0.2     Bilirubin (UA)               BILIRUBIN TOTAL               Blood Culture, Routine         No Growth to date[P]     BNP         78  Comment:  Values of less than 100 pg/ml are consistent with non-CHF populations.     Site       RR       BUN, Bld     23         Calcium     8.0         Chloride     94         CO2     24         Color, UA               CPK   879 291         CPK MB   5.2 2.5         Creatinine     1.3         DelSys       Nasal Can       Differential Method Automated       Automated     eGFR if      >60.0         eGFR if non African  American     52.6  Comment:  Calculation used to obtain the estimated glomerular filtration  rate (eGFR) is the CKD-EPI equation.            Eos # 0.0       0.0     Eosinophil% 0.0       0.0     Flow       3       Flu A & B Source               Glucose     192         Glucose, UA               Gran # (ANC) 14.7       17.3     Gran% 95.5       92.3     Hematocrit 35.2       35.0     Hemoglobin 11.7       11.6     Hyaline Casts, UA               Immature Grans (Abs) 0.16  Comment:  Mild elevation in immature granulocytes is non specific and   can be seen in a variety of conditions including stress response,   acute inflammation, trauma and pregnancy. Correlation with other   laboratory and clinical findings is essential.         0.12  Comment:  Mild elevation in immature granulocytes is non specific and   can be seen in a variety of conditions including stress response,   acute inflammation, trauma and pregnancy. Correlation with other   laboratory and clinical findings is essential.       Immature Granulocytes 1.0       0.6     Ketones, UA               Lactate, Pepito     2.2  Comment:  Falsely low lactic acid results can be found in samples   containing >=13.0 mg/dL total bilirubin and/or >=3.5 mg/dL   direct bilirubin.           Leukocytes, UA               Lymph # 0.3       0.4     Lymph% 2.2       2.2     Magnesium               MB%   0.6  Comment:  To be positive, the MB% must be greater than 5% AND the CK-MB  greater than 6.5 ng/mL. Values not in the reference interval,   but not qualifying as positive, should be considered   &quot;trace&quot;.   0.9  Comment:  To be positive, the MB% must be greater than 5% AND the CK-MB  greater than 6.5 ng/mL. Values not in the reference interval,   but not qualifying as positive, should be considered   &quot;trace&quot;.           MCH 30.5       30.3     MCHC 33.2       33.1     MCV 92       91     Microscopic Comment               Mode       SPONT       Mono # 0.2       0.9      Mono% 1.2       4.7     MPV 10.6       10.1     NITRITE UA               nRBC 0       0     Occult Blood UA               pH, UA               Platelets 181       185     POC BE       -3       POC HCO3       21.0       POC PCO2       30.5       POC PH       7.446       POC PO2       67       POC SATURATED O2       94       POC TCO2       22       Potassium     3.5         PROTEIN TOTAL               Protein, UA               RBC 3.84       3.83     RBC, UA               RDW 13.9       13.6     Sample       ARTERIAL       SARS-CoV-2 RNA, Amplification, Qual               Sodium     129         Specific [place], UA               Specimen UA               Troponin I 0.05 0.07 0.07         UROBILINOGEN UA               WBC, UA               WBC 15.36       18.74                      04/29/20  1335   04/29/20  1301   04/29/20  1259   04/29/20  1257        Influenza A, Molecular Negative           Influenza B, Molecular Negative           Albumin     3.7       Alkaline Phosphatase     88       Allens Test             ALT     17       Anion Gap     12       Appearance, UA   Clear         AST     21       Bacteria, UA   Few         Baso #             Basophil%             Bilirubin (UA)   Negative         BILIRUBIN TOTAL     1.3  Comment:  For infants and newborns, interpretation of results should be based  on gestational age, weight and in agreement with clinical  observations.  Premature Infant recommended reference ranges:  Up to 24 hours.............<8.0 mg/dL  Up to 48 hours............<12.0 mg/dL  3-5 days..................<15.0 mg/dL  6-29 days.................<15.0 mg/dL         Blood Culture, Routine             BNP             Site             BUN, Bld     22       Calcium     8.3       Chloride     96       CO2     21       Color, UA   Yellow         CPK             CPK MB             Creatinine     1.0       DelSys             Differential Method             eGFR if      >60.0       eGFR if  non      >60.0  Comment:  Calculation used to obtain the estimated glomerular filtration  rate (eGFR) is the CKD-EPI equation.          Eos #             Eosinophil%             Flow             Flu A & B Source Nasal Swab           Glucose     226       Glucose, UA   Negative         Gran # (ANC)             Gran%             Hematocrit             Hemoglobin             Hyaline Casts, UA   0         Immature Grans (Abs)             Immature Granulocytes             Ketones, UA   Negative         Lactate, Pepito     2.5  Comment:  Falsely low lactic acid results can be found in samples   containing >=13.0 mg/dL total bilirubin and/or >=3.5 mg/dL   direct bilirubin.         Leukocytes, UA   Negative         Lymph #             Lymph%             Magnesium     1.3       MB%             MCH             MCHC             MCV             Microscopic Comment   SEE COMMENT  Comment:  Other formed elements not mentioned in the report are not   present in the microscopic examination.            Mode             Mono #             Mono%             MPV             NITRITE UA   Negative         nRBC             Occult Blood UA   Trace         pH, UA   6.0         Platelets             POC BE             POC HCO3             POC PCO2             POC PH             POC PO2             POC SATURATED O2             POC TCO2             Potassium     3.8       PROTEIN TOTAL     7.0       Protein, UA   2+  Comment:  Recommend a 24 hour urine protein or a urine   protein/creatinine ratio if globulin induced proteinuria is  clinically suspected.           RBC             RBC, UA   2         RDW             Sample             SARS-CoV-2 RNA, Amplification, Qual       Negative  Comment:  This test utilizes isothermal nucleic acid amplification   technology to detect the SARS-CoV-2 RdRp nucleic acid segment.   The analytical sensitivity (limit of detection) is 125 genome   equivalents/mL.   A POSITIVE result implies  infection with the SARS-CoV-2 virus;  the patient is presumed to be contagious.    A NEGATIVE result means that SARS-CoV-2 nucleic acids are not  present above the limit of detection. It does not rule out the   possibility of COVID-19 and should not be the sole basis for   treatment decisions. If COVID-19 is strongly suspected based on  clinical and exposure history, re-testing should be considered.   This test is only for use under the Food and Drug   Administration s Emergency Use Authorization (EUA).   Commercial kits are provided by [employer].   Performance characteristics of the EUA have been independently  verified by [facility] Department of  Pathology and Laboratory Medicine.   _________________________________________________________________  The ID NOW COVID-19 Letter of Authorization, along with the   authorized Fact Sheet for Healthcare Providers, the authorized Fact  Sheet for Patients, and authorized labeling are available on the FDA   website:  www.fda.gov/MedicalDevices/Safety/EmergencySituations/jhv449813.htm       Sodium     129       Specific Gravity, UA   >=1.030         Specimen UA   Urine, Clean Catch         Troponin I             UROBILINOGEN UA   Negative         WBC, UA   5         WBC                 All pertinent labs within the past 24 hours have been reviewed.    Significant Imaging: I have reviewed and interpreted all pertinent imaging results/findings within the past 24 hours.

## 2020-04-30 NOTE — PLAN OF CARE
Problem: Fall Injury Risk  Goal: Absence of Fall and Fall-Related Injury  Outcome: Ongoing, Progressing     Problem: Adult Inpatient Plan of Care  Goal: Plan of Care Review  Outcome: Ongoing, Progressing  Goal: Patient-Specific Goal (Individualization)  Outcome: Ongoing, Progressing  Goal: Absence of Hospital-Acquired Illness or Injury  Outcome: Ongoing, Progressing  Goal: Optimal Comfort and Wellbeing  Outcome: Ongoing, Progressing  Goal: Readiness for Transition of Care  Outcome: Ongoing, Progressing  Goal: Rounds/Family Conference  Outcome: Ongoing, Progressing     Problem: Fluid Imbalance (Pneumonia)  Goal: Fluid Balance  Outcome: Ongoing, Progressing     Problem: Infection (Pneumonia)  Goal: Resolution of Infection Signs/Symptoms  Outcome: Ongoing, Progressing     Problem: Respiratory Compromise (Pneumonia)  Goal: Effective Oxygenation and Ventilation  Outcome: Ongoing, Progressing

## 2020-04-30 NOTE — ASSESSMENT & PLAN NOTE
04/29/2020:  · Begin empiric antibiotic therapy with ceftriaxone 1 g q.12 hours, and this azithromycin 500 mg IV daily  · Continue respiratory toilet with DuoNeb nebulizer treatments every 4 hr  · Incentive spirometry every 4 hr  · Repeat chest x-ray in the a.m..  · Guaifenesin 600 mg p.o. b.i.d.  · Monitor progression of symptoms or chest x-ray findings in the a.m..  · Repeat ABG in the a.m    04/30/2020  Continue current antibiotics.  Repeat labs in the a.m. to include CBC and BMP  Continue incentive spirometry  Add guaifenesin 600 mg p.o. b.i.d.  Follow-up otherwise as needed based on clinical response  Begin tapering corticosteroids today.
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
## 2020-04-30 NOTE — PLAN OF CARE
Problem: Fall Injury Risk  Goal: Absence of Fall and Fall-Related Injury  Outcome: Ongoing, Progressing     Problem: Adult Inpatient Plan of Care  Goal: Plan of Care Review  Outcome: Ongoing, Progressing     Problem: Adult Inpatient Plan of Care  Goal: Patient-Specific Goal (Individualization)  Outcome: Ongoing, Progressing     Problem: Adult Inpatient Plan of Care  Goal: Absence of Hospital-Acquired Illness or Injury  Outcome: Ongoing, Progressing     Problem: Adult Inpatient Plan of Care  Goal: Optimal Comfort and Wellbeing  Outcome: Ongoing, Progressing

## 2020-05-01 VITALS
HEIGHT: 67 IN | SYSTOLIC BLOOD PRESSURE: 109 MMHG | TEMPERATURE: 97 F | OXYGEN SATURATION: 95 % | HEART RATE: 63 BPM | WEIGHT: 217.81 LBS | RESPIRATION RATE: 14 BRPM | BODY MASS INDEX: 34.19 KG/M2 | DIASTOLIC BLOOD PRESSURE: 61 MMHG

## 2020-05-01 LAB
ALBUMIN SERPL BCP-MCNC: 3.1 G/DL (ref 3.5–5.2)
ALP SERPL-CCNC: 74 U/L (ref 55–135)
ALT SERPL W/O P-5'-P-CCNC: 21 U/L (ref 10–44)
ANION GAP SERPL CALC-SCNC: 8 MMOL/L (ref 8–16)
AST SERPL-CCNC: 35 U/L (ref 10–40)
BASOPHILS # BLD AUTO: 0.01 K/UL (ref 0–0.2)
BASOPHILS NFR BLD: 0.1 % (ref 0–1.9)
BILIRUB SERPL-MCNC: 0.7 MG/DL (ref 0.1–1)
BUN SERPL-MCNC: 31 MG/DL (ref 8–23)
CALCIUM SERPL-MCNC: 7.7 MG/DL (ref 8.7–10.5)
CHLORIDE SERPL-SCNC: 98 MMOL/L (ref 95–110)
CK SERPL-CCNC: 475 U/L (ref 20–200)
CO2 SERPL-SCNC: 24 MMOL/L (ref 23–29)
CREAT SERPL-MCNC: 1.1 MG/DL (ref 0.5–1.4)
DIFFERENTIAL METHOD: ABNORMAL
EOSINOPHIL # BLD AUTO: 0 K/UL (ref 0–0.5)
EOSINOPHIL NFR BLD: 0 % (ref 0–8)
ERYTHROCYTE [DISTWIDTH] IN BLOOD BY AUTOMATED COUNT: 13.6 % (ref 11.5–14.5)
EST. GFR  (AFRICAN AMERICAN): >60 ML/MIN/1.73 M^2
EST. GFR  (NON AFRICAN AMERICAN): >60 ML/MIN/1.73 M^2
GLUCOSE SERPL-MCNC: 345 MG/DL (ref 70–110)
HCT VFR BLD AUTO: 33.2 % (ref 40–54)
HGB BLD-MCNC: 10.8 G/DL (ref 14–18)
IMM GRANULOCYTES # BLD AUTO: 0.07 K/UL (ref 0–0.04)
IMM GRANULOCYTES NFR BLD AUTO: 0.5 % (ref 0–0.5)
LYMPHOCYTES # BLD AUTO: 0.6 K/UL (ref 1–4.8)
LYMPHOCYTES NFR BLD: 3.7 % (ref 18–48)
MCH RBC QN AUTO: 29.9 PG (ref 27–31)
MCHC RBC AUTO-ENTMCNC: 32.5 G/DL (ref 32–36)
MCV RBC AUTO: 92 FL (ref 82–98)
MONOCYTES # BLD AUTO: 0.6 K/UL (ref 0.3–1)
MONOCYTES NFR BLD: 3.9 % (ref 4–15)
NEUTROPHILS # BLD AUTO: 14.1 K/UL (ref 1.8–7.7)
NEUTROPHILS NFR BLD: 91.8 % (ref 38–73)
NRBC BLD-RTO: 0 /100 WBC
PLATELET # BLD AUTO: 173 K/UL (ref 150–350)
PMV BLD AUTO: 11 FL (ref 9.2–12.9)
POTASSIUM SERPL-SCNC: 4.2 MMOL/L (ref 3.5–5.1)
PROT SERPL-MCNC: 6.7 G/DL (ref 6–8.4)
RBC # BLD AUTO: 3.61 M/UL (ref 4.6–6.2)
SODIUM SERPL-SCNC: 130 MMOL/L (ref 136–145)
WBC # BLD AUTO: 15.3 K/UL (ref 3.9–12.7)

## 2020-05-01 PROCEDURE — 82550 ASSAY OF CK (CPK): CPT

## 2020-05-01 PROCEDURE — 99239 PR HOSPITAL DISCHARGE DAY,>30 MIN: ICD-10-PCS | Mod: ,,, | Performed by: FAMILY MEDICINE

## 2020-05-01 PROCEDURE — 36415 COLL VENOUS BLD VENIPUNCTURE: CPT

## 2020-05-01 PROCEDURE — 63600175 PHARM REV CODE 636 W HCPCS: Performed by: INTERNAL MEDICINE

## 2020-05-01 PROCEDURE — 25000003 PHARM REV CODE 250: Performed by: INTERNAL MEDICINE

## 2020-05-01 PROCEDURE — 94761 N-INVAS EAR/PLS OXIMETRY MLT: CPT

## 2020-05-01 PROCEDURE — 94640 AIRWAY INHALATION TREATMENT: CPT

## 2020-05-01 PROCEDURE — 85025 COMPLETE CBC W/AUTO DIFF WBC: CPT

## 2020-05-01 PROCEDURE — 27000221 HC OXYGEN, UP TO 24 HOURS

## 2020-05-01 PROCEDURE — 25000242 PHARM REV CODE 250 ALT 637 W/ HCPCS: Performed by: INTERNAL MEDICINE

## 2020-05-01 PROCEDURE — 99239 HOSP IP/OBS DSCHRG MGMT >30: CPT | Mod: ,,, | Performed by: FAMILY MEDICINE

## 2020-05-01 PROCEDURE — 25000003 PHARM REV CODE 250: Performed by: FAMILY MEDICINE

## 2020-05-01 PROCEDURE — 80053 COMPREHEN METABOLIC PANEL: CPT

## 2020-05-01 RX ORDER — GUAIFENESIN 600 MG/1
600 TABLET, EXTENDED RELEASE ORAL 2 TIMES DAILY PRN
Qty: 30 TABLET | Refills: 0 | Status: ON HOLD | OUTPATIENT
Start: 2020-05-01 | End: 2022-09-09 | Stop reason: HOSPADM

## 2020-05-01 RX ORDER — PANTOPRAZOLE SODIUM 40 MG/1
40 TABLET, DELAYED RELEASE ORAL DAILY
Qty: 30 TABLET | Refills: 11 | Status: SHIPPED | OUTPATIENT
Start: 2020-05-02 | End: 2022-09-07

## 2020-05-01 RX ORDER — PANTOPRAZOLE SODIUM 40 MG/1
40 TABLET, DELAYED RELEASE ORAL DAILY
Status: DISCONTINUED | OUTPATIENT
Start: 2020-05-01 | End: 2020-05-01 | Stop reason: HOSPADM

## 2020-05-01 RX ORDER — PREDNISONE 10 MG/1
TABLET ORAL
Qty: 18 TABLET | Refills: 0 | Status: SHIPPED | OUTPATIENT
Start: 2020-05-01 | End: 2020-05-16

## 2020-05-01 RX ORDER — DOXYCYCLINE HYCLATE 100 MG
100 TABLET ORAL 2 TIMES DAILY
Qty: 10 TABLET | Refills: 0 | Status: ON HOLD | OUTPATIENT
Start: 2020-05-01 | End: 2022-09-09 | Stop reason: HOSPADM

## 2020-05-01 RX ADMIN — HYDROCODONE BITARTRATE AND ACETAMINOPHEN 1 TABLET: 5; 325 TABLET ORAL at 12:05

## 2020-05-01 RX ADMIN — IPRATROPIUM BROMIDE AND ALBUTEROL SULFATE 3 ML: .5; 3 SOLUTION RESPIRATORY (INHALATION) at 04:05

## 2020-05-01 RX ADMIN — SODIUM CHLORIDE: 0.9 INJECTION, SOLUTION INTRAVENOUS at 06:05

## 2020-05-01 RX ADMIN — GUAIFENESIN 600 MG: 600 TABLET, EXTENDED RELEASE ORAL at 08:05

## 2020-05-01 RX ADMIN — METHYLPREDNISOLONE SODIUM SUCCINATE 80 MG: 40 INJECTION, POWDER, FOR SOLUTION INTRAMUSCULAR; INTRAVENOUS at 02:05

## 2020-05-01 RX ADMIN — IPRATROPIUM BROMIDE AND ALBUTEROL SULFATE 3 ML: .5; 3 SOLUTION RESPIRATORY (INHALATION) at 12:05

## 2020-05-01 RX ADMIN — METHYLPREDNISOLONE SODIUM SUCCINATE 80 MG: 40 INJECTION, POWDER, FOR SOLUTION INTRAMUSCULAR; INTRAVENOUS at 05:05

## 2020-05-01 RX ADMIN — CEFTRIAXONE 1 G: 1 INJECTION, SOLUTION INTRAVENOUS at 02:05

## 2020-05-01 RX ADMIN — IPRATROPIUM BROMIDE AND ALBUTEROL SULFATE 3 ML: .5; 3 SOLUTION RESPIRATORY (INHALATION) at 07:05

## 2020-05-01 RX ADMIN — CEFTRIAXONE 1 G: 1 INJECTION, SOLUTION INTRAVENOUS at 01:05

## 2020-05-01 RX ADMIN — PANTOPRAZOLE SODIUM 40 MG: 40 TABLET, DELAYED RELEASE ORAL at 08:05

## 2020-05-01 NOTE — NURSING
PT TAKEN VIA WC TO POV OF HIS DAUGHTER/AVS AND RX INSTRUCTIONS GIVEN TO PTS DAUGHTER WHO VERBALIZED UNDERSTANDING. ALL PERSONAL BELONGINGS AND PTS MEDICATIONS GIVEN TO PTS DAUGHTER. PT GOT INTO POV WITHOUT ASSIST.

## 2020-05-01 NOTE — NURSING
"AM MEDICATIONS GIVEN. PT REFUSED TO TAKE THE LISINOPRIL STATING "I DON'T TAKE BLOOD PRESSURE MEDICINE ANYMORE". RN ATTEMPTED TO TALK WITH PT ABOUT LISINOPRIL AND PT REFUSED TO CHANGE HIS MIND.  "

## 2020-05-01 NOTE — NURSING
CC WITH DR REESE WITH RESULTS OF PTS INABILITY TO AMB DUE TO HIS ANKLE PROBLEM/PT LIVES WITH HIS DAUGHTER & SON IN LAW WHO HELP HIM/ROOM AIR O2 GOES DOWN TO 89-90 WHEN PT IS CARRYING ON A CONVERSATION BUT GOES BACK TO 97-99 AFTER HE STOPS TALKING.   PT AGREED TO GOING HOME TODAY.

## 2020-05-01 NOTE — NURSING
AVS AND RX INSTRUCTIONS GIVEN TO PT/PT VERBALIZED UNDERSTANDING. RN [facility] 8[facility]:30. PT VERBALIZED UNDERSTANDING.

## 2020-05-01 NOTE — PHYSICIAN QUERY
PT Name: Larry Lepley  MR #: [id number]    Physician Query Form - Respiratory Condition Clarification      CDS: Samuel Montez RN CCDS               Contact information: [email or web address]     This form is a permanent document in the medical record.    Query Date: May 1, 2020    By submitting this query, we are merely seeking further clarification of documentation. Please utilize your independent clinical judgment when addressing the question(s) below.    The Medical record contains the following   Indicators   Supporting Clinical Findings Location in Medical Record     x SOB, KHAN, Wheezing, Productive Cough, Use of Accessory Muscles, etc. increasing shortness of breath, wheezes, His initial O2 sat at the scene was 88%.  This increased to 95% on 4 L of nasal O2 4/29/2020 ED provider notes Kulwant Traore MD     x Acute/Chronic Illness Atypical pneumonia  COPD 4/29/2020 H&P Diego Montes MD  4/30/2020 Hospital Medicine progress notes Diego Montes MD     x Radiology Findings Cardiomegaly.  Mild nonspecific interstitial prominence. 4/29/2020 Chest x-ray      x Respiratory Distress or Failure respiratory distress 4/30/2020 Hospital Medicine progress notes Diego Montes MD    Hypoxia or Hypercapnia       x RR         ABGs         O2 sat O2 sat 88%, RR 25     4/29/2020 14:48   POC PH 7.446   POC PCO2 30.5 (L)   POC PO2 67 (L)   POC BE -3   POC HCO3 21.0 (L)   POC SATURATED O2 94 (L)   POC TCO2 22 (L)   Flow 3   Sample ARTERIAL    4/29/2020 Vitals    ABGs    BiPAP/Intubation       x Supplemental O2 O2 @ 3L 4/29/2020 Vitals    Home O2, Oxygen Dependence      Treatment      Other       Respiratory failure can be acute, chronic or both. It is generally further specified as hypoxic, hypercapnic or both. Lastly, it is important to identify an etiology, if known or suspected.   References::  https://www.acphospitalist.org/archives/2013/10/coding.htm http://[email or web address]-respiratory-failure-know     The  clinical guidelines noted below are only system guidelines, and do not replace the providers clinical judgment.    Provider, please specify diagnosis or diagnoses associated with above clinical findings.   [ x  ] Acute Respiratory Failure with Hypoxia - ABG pO2 < 60 mmHg or O2 sat of 88% on RA and respiratory symptoms documented   [   ] Hypoxia Only   [   ] Other Respiratory Diagnosis (please specify): _________________________________   [   ]  Clinically Undetermined       Please document in your progress notes daily for the duration of treatment until resolved and include in your discharge summary.

## 2020-05-04 LAB
BACTERIA BLD CULT: NORMAL
BACTERIA BLD CULT: NORMAL

## 2020-05-05 NOTE — PLAN OF CARE
05/04/20 1525   Final Note   Assessment Type Final Discharge Note   Anticipated Discharge Disposition Home   What phone number can be called within the next 1-3 days to see how you are doing after discharge? [phone]   Hospital Follow Up  Appt(s) scheduled? Yes   Discharge plans and expectations educations in teach back method with documentation complete? Yes     SHRUTHI scheduled post hospital follow up with [facility]/ Dr Raisa Barraza, MS for Tuesday 05/05/20 at 1500. Pt and his daughter were informed this is a telephonic appointment and the clinic will contact the pt on his cell phone. Daughter verbalized understanding of this appointment. [facility] he had previously scheduled is for mental health and it is on 05/06/20 at 0930. This information was provided to the pts daughter for understanding of two appointments. There are no other tasks noted. This plan is complete.

## 2020-05-14 NOTE — DISCHARGE SUMMARY
[facility] Medicine  Discharge Summary      Patient Name: Larry Lepley  MRN: [id number]  Admission Date: 4/29/2020  Hospital Length of Stay: 2 days  Discharge Date and Time: 5/1/2020  6:21 PM  Attending Physician: No att. providers found   Discharging Provider: Sabrina Huerta MD  Primary Care Provider: [facility] [place]        HPI: Patient is a 79-year-old male that presented to the emergency department complaining of increasing shortness of breath.  Patient also states he has had fever over the last 24 hr up to 102.  He states that he was having generalized myalgias arthralgias throughout the night along with fever.  He denies nausea and vomiting and states some fever and chills associated.  Patient states he was having increasing shortness of breath this morning and presented to the emergency department for evaluation.  Patient has past medical history significant for hypertension, gastroesophageal reflux disease, hearing deficit, and COPD.  Patient normally is a patient of the [facility] in [place].    * No surgery found *      Hospital Course:   Evaluation emergency department today revealed influenza swab negative, blood cultures are pending, BNP 78  White blood cell count 41840 hemoglobin 11.6 hematocrit 35 differential showed 92 granulocytes to lymphocytes  Lactic acid 2.5 elevated  Magnesium 1.3  Sodium 129 potassium 3.8 glucose 226 BUN creatinine 22 and 1.0 and anion gap 12 in GFR greater than 60  COVID negative  Urinalysis showed specific gravity 1.0302+ protein trace occult blood     04/30/2020:  Patient states feeling much better.  Still having some cough but no fever chills nausea vomiting.  White blood cell count 28828, hemoglobin 11.7 hematocrit 35.2 platelets 181, and differential showed granulocytes 95 in lymphocytes 2.2  White blood cell count thought to be related to corticosteroids.  All troponin levels have been normal and  CPK elevated at 879.  Lactic acid level 2.2.    5/01/2020  Patient improved significantly clinically. CK trending down. WBC stable though patient receiving significant amount of steroids. Glucose elevated so close follow up scheduled with PCP to monitor CK and glucose. Discussed with patient. Patient discharged home with specific instructions to continue antibiotics, complete long steroid taper, monitor blood pressure and glucose and follow up within 2-3 days with PCP. Patient had appointment scheduled.     Consults:     Final Active Diagnoses:    Diagnosis Date Noted POA    PRINCIPAL PROBLEM:  Shortness of breath [R06.02] 04/29/2020 Yes    Atypical pneumonia [J18.9] 04/29/2020 Unknown    Essential hypertension [I10] 04/16/2018 Yes      Problems Resolved During this Admission:      Discharged Condition: good    Disposition: Home or Self Care    Follow Up:  Follow-up Information     [facility]. Schedule an appointment as soon as possible for a visit on 5/5/2020.    Contact information:  [address] [place] [address]  [place] [address] [phone]                 Patient Instructions:      CK   Standing Status: Future Standing Exp. Date: 06/30/21     Diet Adult Regular     Notify your health care provider if you experience any of the following:  temperature >100.4     Notify your health care provider if you experience any of the following:  difficulty breathing or increased cough     Activity as tolerated     Medications:  Reconciled Home Medications:      Medication List      START taking these medications    doxycycline 100 MG tablet  Commonly known as:  VIBRA-TABS  Take 1 tablet (100 mg total) by mouth 2 (two) times daily.     guaiFENesin 600 mg 12 hr tablet  Commonly known as:  MUCINEX  Take 1 tablet (600 mg total) by mouth 2 (two) times daily as needed for Congestion.     ipratropium-albuteroL  mcg/actuation inhaler  Commonly known as:  COMBIVENT  Inhale 2 puffs into the lungs every 6  (six) hours as needed for Wheezing. Rescue     pantoprazole 40 MG tablet  Commonly known as:  PROTONIX  Take 1 tablet (40 mg total) by mouth once daily.     predniSONE 10 MG tablet  Commonly known as:  DELTASONE  Take 2 tablets (20 mg total) by mouth once daily for 5 days, THEN 1 tablet (10 mg total) once daily for 5 days, THEN 0.5 tablets (5 mg total) once daily for 5 days.  Start taking on:  May 1, 2020        CONTINUE taking these medications    acetaminophen 500 MG tablet  Commonly known as:  TYLENOL  Take 2 tablets (1,000 mg total) by mouth every 6 (six) hours as needed for Pain or Temperature greater than (or equal to 101 degree F).     HYDROcodone-acetaminophen 5-325 mg per tablet  Commonly known as:  NORCO  Take 1 tablet by mouth every 4 (four) hours as needed.     LISINOPRIL ORAL  Take 10 mg by mouth once daily.        STOP taking these medications    FLEXERIL ORAL            Significant Diagnostic Studies: Labs: BMP: No results for input(s): GLU, NA, K, CL, CO2, BUN, CREATININE, CALCIUM, MG in the last 48 hours., CBC No results for input(s): WBC, HGB, HCT, PLT in the last 48 hours. and All labs within the past 24 hours have been reviewed    CK trending down from 800s to 400s  WBC stable at 15,000  BUN 31  sCr 1.1    Radiology: X-Ray: CXR: X-Ray Chest PA and Lateral (CXR):   Results for orders placed or performed during the hospital encounter of 04/29/20   X-Ray Chest PA And Lateral    Narrative    EXAMINATION:  XR CHEST PA AND LATERAL    CLINICAL HISTORY:  pneumonia;    TECHNIQUE:  PA and lateral views of the chest were performed.    COMPARISON:  Yesterday    FINDINGS:  Two views of the chest were obtained with a better degree of inspiration.  Previously noted interstitial changes have largely resolved.  Lungs appear similar to an older chest x-ray from 2017.  There is no focal consolidation.  The upper lung zones are hyperlucent, suggesting underlying emphysema.  The cardiomediastinal silhouette is within  normal limits.  There is no pleural effusion.      Impression    Improved aeration of the lungs which currently appears similar to an older study from 2017.  Probable underlying COPD.      Electronically signed by: Tessa Leon  Date:    04/30/2020  Time:    09:01       Pending Diagnostic Studies:     None        Indwelling Lines/Drains at time of discharge:   Lines/Drains/Airways     None                 Time spent on the discharge of patient: 45 minutes  Patient was seen and examined on the date of discharge and determined to be suitable for discharge.         Sabrina Huerta MD  Department of Hospital Medicine  [facility]

## 2021-01-26 NOTE — PROGRESS NOTES
04/19/18 0752   Patient Assessment/Suction   Level of Consciousness (AVPU) alert   PRE-TX-O2-ETCO2   O2 Device (Oxygen Therapy) room air   SpO2 96 %   Pulse Oximetry Type Intermittent   $ Pulse Oximetry - Multiple Charge Pulse Oximetry - Multiple      Topical Sulfur Applications Pregnancy And Lactation Text: This medication is Pregnancy Category C and has an unknown safety profile during pregnancy. It is unknown if this topical medication is excreted in breast milk.

## 2021-05-23 ENCOUNTER — HOSPITAL ENCOUNTER (EMERGENCY)
Facility: HOSPITAL | Age: 81
Discharge: HOME | End: 2021-05-23
Attending: FAMILY MEDICINE
Payer: OTHER GOVERNMENT

## 2021-05-23 VITALS
HEART RATE: 64 BPM | SYSTOLIC BLOOD PRESSURE: 141 MMHG | RESPIRATION RATE: 22 BRPM | TEMPERATURE: 99 F | DIASTOLIC BLOOD PRESSURE: 83 MMHG | HEIGHT: 67 IN | WEIGHT: 262.81 LBS | OXYGEN SATURATION: 98 % | BODY MASS INDEX: 41.25 KG/M2

## 2021-05-23 DIAGNOSIS — R07.9 CHEST PAIN: ICD-10-CM

## 2021-05-23 DIAGNOSIS — N30.01 ACUTE CYSTITIS WITH HEMATURIA: Primary | ICD-10-CM

## 2021-05-23 LAB
ALBUMIN SERPL BCP-MCNC: 3.4 G/DL (ref 3.5–5.2)
ALP SERPL-CCNC: 92 U/L (ref 55–135)
ALT SERPL W/O P-5'-P-CCNC: 17 U/L (ref 10–44)
ANION GAP SERPL CALC-SCNC: 11 MMOL/L (ref 8–16)
AST SERPL-CCNC: 18 U/L (ref 10–40)
BACTERIA #/AREA URNS HPF: ABNORMAL /HPF
BASOPHILS # BLD AUTO: 0.02 K/UL (ref 0–0.2)
BASOPHILS NFR BLD: 0.1 % (ref 0–1.9)
BILIRUB SERPL-MCNC: 0.8 MG/DL (ref 0.1–1)
BILIRUB UR QL STRIP: NEGATIVE
BUN SERPL-MCNC: 22 MG/DL (ref 8–23)
CALCIUM SERPL-MCNC: 8.1 MG/DL (ref 8.7–10.5)
CHLORIDE SERPL-SCNC: 105 MMOL/L (ref 95–110)
CLARITY UR: CLEAR
CO2 SERPL-SCNC: 21 MMOL/L (ref 23–29)
COLOR UR: YELLOW
CREAT SERPL-MCNC: 0.9 MG/DL (ref 0.5–1.4)
DIFFERENTIAL METHOD: ABNORMAL
EOSINOPHIL # BLD AUTO: 0 K/UL (ref 0–0.5)
EOSINOPHIL NFR BLD: 0.1 % (ref 0–8)
ERYTHROCYTE [DISTWIDTH] IN BLOOD BY AUTOMATED COUNT: 12.8 % (ref 11.5–14.5)
EST. GFR  (AFRICAN AMERICAN): >60 ML/MIN/1.73 M^2
EST. GFR  (NON AFRICAN AMERICAN): >60 ML/MIN/1.73 M^2
GLUCOSE SERPL-MCNC: 124 MG/DL (ref 70–110)
GLUCOSE UR QL STRIP: NEGATIVE
HCT VFR BLD AUTO: 36.2 % (ref 40–54)
HGB BLD-MCNC: 11.8 G/DL (ref 14–18)
HGB UR QL STRIP: NEGATIVE
IMM GRANULOCYTES # BLD AUTO: 0.1 K/UL (ref 0–0.04)
IMM GRANULOCYTES NFR BLD AUTO: 0.7 % (ref 0–0.5)
KETONES UR QL STRIP: NEGATIVE
LEUKOCYTE ESTERASE UR QL STRIP: ABNORMAL
LIPASE SERPL-CCNC: 34 U/L (ref 4–60)
LYMPHOCYTES # BLD AUTO: 0.5 K/UL (ref 1–4.8)
LYMPHOCYTES NFR BLD: 3.4 % (ref 18–48)
MCH RBC QN AUTO: 30.2 PG (ref 27–31)
MCHC RBC AUTO-ENTMCNC: 32.6 G/DL (ref 32–36)
MCV RBC AUTO: 93 FL (ref 82–98)
MICROSCOPIC COMMENT: ABNORMAL
MONOCYTES # BLD AUTO: 0.7 K/UL (ref 0.3–1)
MONOCYTES NFR BLD: 4.9 % (ref 4–15)
NEUTROPHILS # BLD AUTO: 13.2 K/UL (ref 1.8–7.7)
NEUTROPHILS NFR BLD: 90.8 % (ref 38–73)
NITRITE UR QL STRIP: NEGATIVE
NRBC BLD-RTO: 0 /100 WBC
PH UR STRIP: 6 [PH] (ref 5–8)
PLATELET # BLD AUTO: 176 K/UL (ref 150–450)
PMV BLD AUTO: 10.3 FL (ref 9.2–12.9)
POTASSIUM SERPL-SCNC: 3.9 MMOL/L (ref 3.5–5.1)
PROT SERPL-MCNC: 6.4 G/DL (ref 6–8.4)
PROT UR QL STRIP: NEGATIVE
RBC # BLD AUTO: 3.91 M/UL (ref 4.6–6.2)
RBC #/AREA URNS HPF: 4 /HPF (ref 0–4)
SARS-COV-2 RDRP RESP QL NAA+PROBE: NEGATIVE
SODIUM SERPL-SCNC: 137 MMOL/L (ref 136–145)
SP GR UR STRIP: 1.02 (ref 1–1.03)
SQUAMOUS #/AREA URNS HPF: 6 /HPF
TROPONIN I SERPL DL<=0.01 NG/ML-MCNC: 0.01 NG/ML (ref 0–0.03)
URN SPEC COLLECT METH UR: ABNORMAL
UROBILINOGEN UR STRIP-ACNC: NEGATIVE EU/DL
WBC # BLD AUTO: 14.5 K/UL (ref 3.9–12.7)
WBC #/AREA URNS HPF: 15 /HPF (ref 0–5)

## 2021-05-23 PROCEDURE — 87086 URINE CULTURE/COLONY COUNT: CPT | Performed by: FAMILY MEDICINE

## 2021-05-23 PROCEDURE — 85025 COMPLETE CBC W/AUTO DIFF WBC: CPT | Performed by: FAMILY MEDICINE

## 2021-05-23 PROCEDURE — 80053 COMPREHEN METABOLIC PANEL: CPT | Performed by: FAMILY MEDICINE

## 2021-05-23 PROCEDURE — 71045 X-RAY EXAM CHEST 1 VIEW: CPT | Mod: TC,FY

## 2021-05-23 PROCEDURE — 71045 X-RAY EXAM CHEST 1 VIEW: CPT | Mod: 26,,, | Performed by: RADIOLOGY

## 2021-05-23 PROCEDURE — 96361 HYDRATE IV INFUSION ADD-ON: CPT

## 2021-05-23 PROCEDURE — 96360 HYDRATION IV INFUSION INIT: CPT

## 2021-05-23 PROCEDURE — 84484 ASSAY OF TROPONIN QUANT: CPT | Performed by: FAMILY MEDICINE

## 2021-05-23 PROCEDURE — U0002 COVID-19 LAB TEST NON-CDC: HCPCS | Performed by: FAMILY MEDICINE

## 2021-05-23 PROCEDURE — 99284 EMERGENCY DEPT VISIT MOD MDM: CPT | Mod: 25

## 2021-05-23 PROCEDURE — 71045 XR CHEST AP PORTABLE: ICD-10-PCS | Mod: 26,,, | Performed by: RADIOLOGY

## 2021-05-23 PROCEDURE — 83690 ASSAY OF LIPASE: CPT | Performed by: FAMILY MEDICINE

## 2021-05-23 PROCEDURE — 81000 URINALYSIS NONAUTO W/SCOPE: CPT | Performed by: FAMILY MEDICINE

## 2021-05-23 PROCEDURE — 25000003 PHARM REV CODE 250: Performed by: FAMILY MEDICINE

## 2021-05-23 RX ORDER — AMOXICILLIN 500 MG/1
500 CAPSULE ORAL 3 TIMES DAILY
Qty: 21 CAPSULE | Refills: 0 | Status: SHIPPED | OUTPATIENT
Start: 2021-05-23 | End: 2021-06-09

## 2021-05-23 RX ORDER — AMOXICILLIN 250 MG/1
500 CAPSULE ORAL
Status: CANCELLED | OUTPATIENT
Start: 2021-05-23 | End: 2021-05-23

## 2021-05-23 RX ORDER — SODIUM CHLORIDE 9 MG/ML
INJECTION, SOLUTION INTRAVENOUS
Status: COMPLETED | OUTPATIENT
Start: 2021-05-23 | End: 2021-05-23

## 2021-05-23 RX ADMIN — SODIUM CHLORIDE: 0.9 INJECTION, SOLUTION INTRAVENOUS at 07:05

## 2021-05-25 LAB — BACTERIA UR CULT: NO GROWTH

## 2021-06-05 ENCOUNTER — HOSPITAL ENCOUNTER (EMERGENCY)
Facility: HOSPITAL | Age: 81
Discharge: HOME | End: 2021-06-05
Attending: INTERNAL MEDICINE
Payer: OTHER GOVERNMENT

## 2021-06-05 VITALS
HEART RATE: 84 BPM | OXYGEN SATURATION: 97 % | BODY MASS INDEX: 34.53 KG/M2 | RESPIRATION RATE: 18 BRPM | TEMPERATURE: 100 F | SYSTOLIC BLOOD PRESSURE: 146 MMHG | HEIGHT: 67 IN | WEIGHT: 220 LBS | DIASTOLIC BLOOD PRESSURE: 101 MMHG

## 2021-06-05 DIAGNOSIS — F41.9 ANXIETY: Primary | ICD-10-CM

## 2021-06-05 DIAGNOSIS — R52 PAIN: ICD-10-CM

## 2021-06-05 LAB
ALBUMIN SERPL BCP-MCNC: 3.6 G/DL (ref 3.5–5.2)
ALP SERPL-CCNC: 88 U/L (ref 55–135)
ALT SERPL W/O P-5'-P-CCNC: 12 U/L (ref 10–44)
AMPHET+METHAMPHET UR QL: NEGATIVE
ANION GAP SERPL CALC-SCNC: 12 MMOL/L (ref 8–16)
APAP SERPL-MCNC: <3 UG/ML (ref 10–20)
AST SERPL-CCNC: 17 U/L (ref 10–40)
BARBITURATES UR QL SCN>200 NG/ML: NEGATIVE
BASOPHILS # BLD AUTO: 0.03 K/UL (ref 0–0.2)
BASOPHILS NFR BLD: 0.3 % (ref 0–1.9)
BENZODIAZ UR QL SCN>200 NG/ML: NEGATIVE
BILIRUB SERPL-MCNC: 0.6 MG/DL (ref 0.1–1)
BILIRUB UR QL STRIP: NEGATIVE
BUN SERPL-MCNC: 14 MG/DL (ref 8–23)
BZE UR QL SCN: NEGATIVE
CALCIUM SERPL-MCNC: 8.6 MG/DL (ref 8.7–10.5)
CANNABINOIDS UR QL SCN: NEGATIVE
CHLORIDE SERPL-SCNC: 106 MMOL/L (ref 95–110)
CLARITY UR: CLEAR
CO2 SERPL-SCNC: 24 MMOL/L (ref 23–29)
COLOR UR: YELLOW
CREAT SERPL-MCNC: 1 MG/DL (ref 0.5–1.4)
CREAT UR-MCNC: 187.1 MG/DL (ref 23–375)
DIFFERENTIAL METHOD: ABNORMAL
EOSINOPHIL # BLD AUTO: 0.2 K/UL (ref 0–0.5)
EOSINOPHIL NFR BLD: 2.1 % (ref 0–8)
ERYTHROCYTE [DISTWIDTH] IN BLOOD BY AUTOMATED COUNT: 12.4 % (ref 11.5–14.5)
EST. GFR  (AFRICAN AMERICAN): >60 ML/MIN/1.73 M^2
EST. GFR  (NON AFRICAN AMERICAN): >60 ML/MIN/1.73 M^2
ETHANOL SERPL-MCNC: <10 MG/DL (ref 0–10)
GLUCOSE SERPL-MCNC: 109 MG/DL (ref 70–110)
GLUCOSE UR QL STRIP: NEGATIVE
HCT VFR BLD AUTO: 37.3 % (ref 40–54)
HGB BLD-MCNC: 12.4 G/DL (ref 14–18)
HGB UR QL STRIP: NEGATIVE
IMM GRANULOCYTES # BLD AUTO: 0.05 K/UL (ref 0–0.04)
IMM GRANULOCYTES NFR BLD AUTO: 0.5 % (ref 0–0.5)
KETONES UR QL STRIP: NEGATIVE
LEUKOCYTE ESTERASE UR QL STRIP: NEGATIVE
LYMPHOCYTES # BLD AUTO: 1.2 K/UL (ref 1–4.8)
LYMPHOCYTES NFR BLD: 13.4 % (ref 18–48)
MCH RBC QN AUTO: 30.8 PG (ref 27–31)
MCHC RBC AUTO-ENTMCNC: 33.2 G/DL (ref 32–36)
MCV RBC AUTO: 93 FL (ref 82–98)
METHADONE UR QL SCN>300 NG/ML: NEGATIVE
MONOCYTES # BLD AUTO: 0.7 K/UL (ref 0.3–1)
MONOCYTES NFR BLD: 7.7 % (ref 4–15)
NEUTROPHILS # BLD AUTO: 7 K/UL (ref 1.8–7.7)
NEUTROPHILS NFR BLD: 76 % (ref 38–73)
NITRITE UR QL STRIP: NEGATIVE
NRBC BLD-RTO: 0 /100 WBC
OPIATES UR QL SCN: NEGATIVE
PCP UR QL SCN>25 NG/ML: NEGATIVE
PH UR STRIP: 6 [PH] (ref 5–8)
PLATELET # BLD AUTO: 223 K/UL (ref 150–450)
PMV BLD AUTO: 9.7 FL (ref 9.2–12.9)
POTASSIUM SERPL-SCNC: 3.9 MMOL/L (ref 3.5–5.1)
PROT SERPL-MCNC: 6.9 G/DL (ref 6–8.4)
PROT UR QL STRIP: NEGATIVE
RBC # BLD AUTO: 4.03 M/UL (ref 4.6–6.2)
SALICYLATES SERPL-MCNC: <5 MG/DL (ref 15–30)
SARS-COV-2 RDRP RESP QL NAA+PROBE: NEGATIVE
SODIUM SERPL-SCNC: 142 MMOL/L (ref 136–145)
SP GR UR STRIP: 1.02 (ref 1–1.03)
TOXICOLOGY INFORMATION: NORMAL
URN SPEC COLLECT METH UR: ABNORMAL
UROBILINOGEN UR STRIP-ACNC: ABNORMAL EU/DL
WBC # BLD AUTO: 9.26 K/UL (ref 3.9–12.7)

## 2021-06-05 PROCEDURE — 82077 ASSAY SPEC XCP UR&BREATH IA: CPT | Performed by: INTERNAL MEDICINE

## 2021-06-05 PROCEDURE — 73502 X-RAY EXAM HIP UNI 2-3 VIEWS: CPT | Mod: 26,LT,, | Performed by: RADIOLOGY

## 2021-06-05 PROCEDURE — 80307 DRUG TEST PRSMV CHEM ANLYZR: CPT | Performed by: INTERNAL MEDICINE

## 2021-06-05 PROCEDURE — 73610 X-RAY EXAM OF ANKLE: CPT | Mod: 26,LT,, | Performed by: RADIOLOGY

## 2021-06-05 PROCEDURE — 99284 EMERGENCY DEPT VISIT MOD MDM: CPT | Mod: 25

## 2021-06-05 PROCEDURE — 36415 COLL VENOUS BLD VENIPUNCTURE: CPT | Performed by: INTERNAL MEDICINE

## 2021-06-05 PROCEDURE — 73610 X-RAY EXAM OF ANKLE: CPT | Mod: TC,FY,LT

## 2021-06-05 PROCEDURE — 73502 XR PELVIS 3 VIEW INC HIP 2 VIEW LEFT: ICD-10-PCS | Mod: 26,LT,, | Performed by: RADIOLOGY

## 2021-06-05 PROCEDURE — 85025 COMPLETE CBC W/AUTO DIFF WBC: CPT | Performed by: INTERNAL MEDICINE

## 2021-06-05 PROCEDURE — 73610 XR ANKLE COMPLETE 3 VIEW LEFT: ICD-10-PCS | Mod: 26,LT,, | Performed by: RADIOLOGY

## 2021-06-05 PROCEDURE — 73562 XR KNEE 3 VIEW LEFT: ICD-10-PCS | Mod: 26,LT,, | Performed by: RADIOLOGY

## 2021-06-05 PROCEDURE — U0002 COVID-19 LAB TEST NON-CDC: HCPCS | Performed by: INTERNAL MEDICINE

## 2021-06-05 PROCEDURE — 81003 URINALYSIS AUTO W/O SCOPE: CPT | Performed by: INTERNAL MEDICINE

## 2021-06-05 PROCEDURE — 80143 DRUG ASSAY ACETAMINOPHEN: CPT | Performed by: INTERNAL MEDICINE

## 2021-06-05 PROCEDURE — 73562 X-RAY EXAM OF KNEE 3: CPT | Mod: 26,LT,, | Performed by: RADIOLOGY

## 2021-06-05 PROCEDURE — 80053 COMPREHEN METABOLIC PANEL: CPT | Performed by: INTERNAL MEDICINE

## 2021-06-05 PROCEDURE — 73502 X-RAY EXAM HIP UNI 2-3 VIEWS: CPT | Mod: TC,FY,LT

## 2021-06-05 PROCEDURE — 73562 X-RAY EXAM OF KNEE 3: CPT | Mod: TC,FY,LT

## 2021-06-05 PROCEDURE — 80179 DRUG ASSAY SALICYLATE: CPT | Performed by: INTERNAL MEDICINE

## 2021-07-17 ENCOUNTER — HOSPITAL ENCOUNTER (EMERGENCY)
Facility: HOSPITAL | Age: 81
Discharge: HOME | End: 2021-07-17
Attending: EMERGENCY MEDICINE
Payer: OTHER GOVERNMENT

## 2021-07-17 VITALS
WEIGHT: 200 LBS | SYSTOLIC BLOOD PRESSURE: 138 MMHG | OXYGEN SATURATION: 97 % | DIASTOLIC BLOOD PRESSURE: 90 MMHG | TEMPERATURE: 98 F | BODY MASS INDEX: 31.39 KG/M2 | RESPIRATION RATE: 20 BRPM | HEART RATE: 91 BPM | HEIGHT: 67 IN

## 2021-07-17 DIAGNOSIS — L03.116 CELLULITIS OF LEFT ANTERIOR LOWER LEG: Primary | ICD-10-CM

## 2021-07-17 DIAGNOSIS — M25.472 ANKLE SWELLING, LEFT: ICD-10-CM

## 2021-07-17 LAB
ALBUMIN SERPL BCP-MCNC: 3.7 G/DL (ref 3.5–5.2)
ALP SERPL-CCNC: 86 U/L (ref 55–135)
ALT SERPL W/O P-5'-P-CCNC: 19 U/L (ref 10–44)
ANION GAP SERPL CALC-SCNC: 12 MMOL/L (ref 8–16)
AST SERPL-CCNC: 15 U/L (ref 10–40)
BASOPHILS # BLD AUTO: 0.03 K/UL (ref 0–0.2)
BASOPHILS NFR BLD: 0.4 % (ref 0–1.9)
BILIRUB SERPL-MCNC: 0.7 MG/DL (ref 0.1–1)
BUN SERPL-MCNC: 20 MG/DL (ref 8–23)
CALCIUM SERPL-MCNC: 9.6 MG/DL (ref 8.7–10.5)
CHLORIDE SERPL-SCNC: 105 MMOL/L (ref 95–110)
CO2 SERPL-SCNC: 24 MMOL/L (ref 23–29)
CREAT SERPL-MCNC: 1.1 MG/DL (ref 0.5–1.4)
CRP SERPL-MCNC: 37.4 MG/L (ref 0–8.2)
D DIMER PPP IA.FEU-MCNC: 0.56 MG/L FEU
DIFFERENTIAL METHOD: ABNORMAL
EOSINOPHIL # BLD AUTO: 0.2 K/UL (ref 0–0.5)
EOSINOPHIL NFR BLD: 2.2 % (ref 0–8)
ERYTHROCYTE [DISTWIDTH] IN BLOOD BY AUTOMATED COUNT: 12.5 % (ref 11.5–14.5)
ERYTHROCYTE [SEDIMENTATION RATE] IN BLOOD BY WESTERGREN METHOD: 36 MM/HR (ref 0–10)
EST. GFR  (AFRICAN AMERICAN): >60 ML/MIN/1.73 M^2
EST. GFR  (NON AFRICAN AMERICAN): >60 ML/MIN/1.73 M^2
GLUCOSE SERPL-MCNC: 121 MG/DL (ref 70–110)
HCT VFR BLD AUTO: 41.5 % (ref 40–54)
HGB BLD-MCNC: 13.5 G/DL (ref 14–18)
IMM GRANULOCYTES # BLD AUTO: 0.07 K/UL (ref 0–0.04)
IMM GRANULOCYTES NFR BLD AUTO: 0.9 % (ref 0–0.5)
LYMPHOCYTES # BLD AUTO: 1.5 K/UL (ref 1–4.8)
LYMPHOCYTES NFR BLD: 19.7 % (ref 18–48)
MCH RBC QN AUTO: 30.5 PG (ref 27–31)
MCHC RBC AUTO-ENTMCNC: 32.5 G/DL (ref 32–36)
MCV RBC AUTO: 94 FL (ref 82–98)
MONOCYTES # BLD AUTO: 0.7 K/UL (ref 0.3–1)
MONOCYTES NFR BLD: 8.8 % (ref 4–15)
NEUTROPHILS # BLD AUTO: 5.1 K/UL (ref 1.8–7.7)
NEUTROPHILS NFR BLD: 68 % (ref 38–73)
NRBC BLD-RTO: 0 /100 WBC
PLATELET # BLD AUTO: 207 K/UL (ref 150–450)
PMV BLD AUTO: 10.1 FL (ref 9.2–12.9)
POTASSIUM SERPL-SCNC: 4.8 MMOL/L (ref 3.5–5.1)
PROT SERPL-MCNC: 7.9 G/DL (ref 6–8.4)
RBC # BLD AUTO: 4.43 M/UL (ref 4.6–6.2)
SODIUM SERPL-SCNC: 141 MMOL/L (ref 136–145)
WBC # BLD AUTO: 7.42 K/UL (ref 3.9–12.7)

## 2021-07-17 PROCEDURE — 73590 X-RAY EXAM OF LOWER LEG: CPT | Mod: TC,FY,LT

## 2021-07-17 PROCEDURE — 99284 EMERGENCY DEPT VISIT MOD MDM: CPT

## 2021-07-17 PROCEDURE — 80053 COMPREHEN METABOLIC PANEL: CPT | Performed by: EMERGENCY MEDICINE

## 2021-07-17 PROCEDURE — 86140 C-REACTIVE PROTEIN: CPT | Performed by: EMERGENCY MEDICINE

## 2021-07-17 PROCEDURE — 73590 XR TIBIA FIBULA 2 VIEW LEFT: ICD-10-PCS | Mod: 26,LT,, | Performed by: RADIOLOGY

## 2021-07-17 PROCEDURE — 85025 COMPLETE CBC W/AUTO DIFF WBC: CPT | Performed by: EMERGENCY MEDICINE

## 2021-07-17 PROCEDURE — 73590 X-RAY EXAM OF LOWER LEG: CPT | Mod: 26,LT,, | Performed by: RADIOLOGY

## 2021-07-17 PROCEDURE — 85651 RBC SED RATE NONAUTOMATED: CPT | Performed by: EMERGENCY MEDICINE

## 2021-07-17 PROCEDURE — 85379 FIBRIN DEGRADATION QUANT: CPT | Performed by: EMERGENCY MEDICINE

## 2021-07-17 PROCEDURE — 25000003 PHARM REV CODE 250: Performed by: EMERGENCY MEDICINE

## 2021-07-17 RX ORDER — CEPHALEXIN 250 MG/1
500 CAPSULE ORAL
Status: COMPLETED | OUTPATIENT
Start: 2021-07-17 | End: 2021-07-17

## 2021-07-17 RX ORDER — CEPHALEXIN 250 MG/1
250 CAPSULE ORAL 4 TIMES DAILY
Qty: 28 CAPSULE | Refills: 0 | Status: SHIPPED | OUTPATIENT
Start: 2021-07-17 | End: 2021-07-24

## 2021-07-17 RX ADMIN — CEPHALEXIN 500 MG: 250 CAPSULE ORAL at 05:07

## 2021-08-21 ENCOUNTER — HOSPITAL ENCOUNTER (EMERGENCY)
Facility: HOSPITAL | Age: 81
Discharge: HOME | End: 2021-08-21
Payer: OTHER GOVERNMENT

## 2021-08-21 VITALS
OXYGEN SATURATION: 96 % | DIASTOLIC BLOOD PRESSURE: 99 MMHG | SYSTOLIC BLOOD PRESSURE: 141 MMHG | WEIGHT: 200 LBS | HEART RATE: 96 BPM | BODY MASS INDEX: 31.39 KG/M2 | HEIGHT: 67 IN | TEMPERATURE: 98 F | RESPIRATION RATE: 16 BRPM

## 2021-08-21 DIAGNOSIS — R19.7 DIARRHEA, UNSPECIFIED TYPE: Primary | ICD-10-CM

## 2021-08-21 LAB
ALBUMIN SERPL BCP-MCNC: 3.2 G/DL (ref 3.5–5.2)
ALP SERPL-CCNC: 73 U/L (ref 55–135)
ALT SERPL W/O P-5'-P-CCNC: 14 U/L (ref 10–44)
ANION GAP SERPL CALC-SCNC: 9 MMOL/L (ref 8–16)
AST SERPL-CCNC: 15 U/L (ref 10–40)
BASOPHILS # BLD AUTO: 0.03 K/UL (ref 0–0.2)
BASOPHILS NFR BLD: 0.2 % (ref 0–1.9)
BILIRUB SERPL-MCNC: 1 MG/DL (ref 0.1–1)
BILIRUB UR QL STRIP: NEGATIVE
BUN SERPL-MCNC: 18 MG/DL (ref 8–23)
C DIFF GDH STL QL: NEGATIVE
C DIFF TOX A+B STL QL IA: NEGATIVE
CALCIUM SERPL-MCNC: 8.2 MG/DL (ref 8.7–10.5)
CHLORIDE SERPL-SCNC: 106 MMOL/L (ref 95–110)
CLARITY UR: CLEAR
CO2 SERPL-SCNC: 22 MMOL/L (ref 23–29)
COLOR UR: YELLOW
CREAT SERPL-MCNC: 1.1 MG/DL (ref 0.5–1.4)
DIFFERENTIAL METHOD: ABNORMAL
EOSINOPHIL # BLD AUTO: 0 K/UL (ref 0–0.5)
EOSINOPHIL NFR BLD: 0 % (ref 0–8)
ERYTHROCYTE [DISTWIDTH] IN BLOOD BY AUTOMATED COUNT: 13 % (ref 11.5–14.5)
EST. GFR  (AFRICAN AMERICAN): >60 ML/MIN/1.73 M^2
EST. GFR  (NON AFRICAN AMERICAN): >60 ML/MIN/1.73 M^2
GLUCOSE SERPL-MCNC: 133 MG/DL (ref 70–110)
GLUCOSE UR QL STRIP: NEGATIVE
HCT VFR BLD AUTO: 36.1 % (ref 40–54)
HGB BLD-MCNC: 12.2 G/DL (ref 14–18)
HGB UR QL STRIP: NEGATIVE
IMM GRANULOCYTES # BLD AUTO: 0.07 K/UL (ref 0–0.04)
IMM GRANULOCYTES NFR BLD AUTO: 0.5 % (ref 0–0.5)
KETONES UR QL STRIP: NEGATIVE
LEUKOCYTE ESTERASE UR QL STRIP: NEGATIVE
LYMPHOCYTES # BLD AUTO: 0.5 K/UL (ref 1–4.8)
LYMPHOCYTES NFR BLD: 3.9 % (ref 18–48)
MCH RBC QN AUTO: 31.1 PG (ref 27–31)
MCHC RBC AUTO-ENTMCNC: 33.8 G/DL (ref 32–36)
MCV RBC AUTO: 92 FL (ref 82–98)
MONOCYTES # BLD AUTO: 0.7 K/UL (ref 0.3–1)
MONOCYTES NFR BLD: 4.8 % (ref 4–15)
NEUTROPHILS # BLD AUTO: 12.6 K/UL (ref 1.8–7.7)
NEUTROPHILS NFR BLD: 90.6 % (ref 38–73)
NITRITE UR QL STRIP: NEGATIVE
NRBC BLD-RTO: 0 /100 WBC
PH UR STRIP: 5 [PH] (ref 5–8)
PLATELET # BLD AUTO: 146 K/UL (ref 150–450)
PMV BLD AUTO: 9.7 FL (ref 9.2–12.9)
POTASSIUM SERPL-SCNC: 3.3 MMOL/L (ref 3.5–5.1)
PROT SERPL-MCNC: 6.3 G/DL (ref 6–8.4)
PROT UR QL STRIP: NEGATIVE
RBC # BLD AUTO: 3.92 M/UL (ref 4.6–6.2)
RV AG STL QL IA.RAPID: NEGATIVE
SARS-COV-2 RDRP RESP QL NAA+PROBE: NEGATIVE
SODIUM SERPL-SCNC: 137 MMOL/L (ref 136–145)
SP GR UR STRIP: 1.01 (ref 1–1.03)
URN SPEC COLLECT METH UR: NORMAL
UROBILINOGEN UR STRIP-ACNC: NEGATIVE EU/DL
WBC # BLD AUTO: 13.89 K/UL (ref 3.9–12.7)

## 2021-08-21 PROCEDURE — 87425 ROTAVIRUS AG IA: CPT | Performed by: NURSE PRACTITIONER

## 2021-08-21 PROCEDURE — 99284 EMERGENCY DEPT VISIT MOD MDM: CPT | Mod: 25

## 2021-08-21 PROCEDURE — 85025 COMPLETE CBC W/AUTO DIFF WBC: CPT | Performed by: NURSE PRACTITIONER

## 2021-08-21 PROCEDURE — U0002 COVID-19 LAB TEST NON-CDC: HCPCS | Performed by: NURSE PRACTITIONER

## 2021-08-21 PROCEDURE — 87045 FECES CULTURE AEROBIC BACT: CPT | Performed by: NURSE PRACTITIONER

## 2021-08-21 PROCEDURE — 96360 HYDRATION IV INFUSION INIT: CPT

## 2021-08-21 PROCEDURE — 80053 COMPREHEN METABOLIC PANEL: CPT | Performed by: NURSE PRACTITIONER

## 2021-08-21 PROCEDURE — 87427 SHIGA-LIKE TOXIN AG IA: CPT | Mod: 59 | Performed by: NURSE PRACTITIONER

## 2021-08-21 PROCEDURE — 25000003 PHARM REV CODE 250: Performed by: NURSE PRACTITIONER

## 2021-08-21 PROCEDURE — 87046 STOOL CULTR AEROBIC BACT EA: CPT | Performed by: NURSE PRACTITIONER

## 2021-08-21 PROCEDURE — 87449 NOS EACH ORGANISM AG IA: CPT | Performed by: NURSE PRACTITIONER

## 2021-08-21 PROCEDURE — 81003 URINALYSIS AUTO W/O SCOPE: CPT | Performed by: NURSE PRACTITIONER

## 2021-08-21 PROCEDURE — 87324 CLOSTRIDIUM AG IA: CPT | Performed by: NURSE PRACTITIONER

## 2021-08-21 RX ORDER — OMEPRAZOLE 20 MG/1
1 CAPSULE, DELAYED RELEASE ORAL DAILY
COMMUNITY
Start: 2021-05-24 | End: 2022-05-29

## 2021-08-21 RX ORDER — L. ACIDOPHILUS/L.BULGARICUS 100MM CELL
1 GRANULES IN PACKET (EA) ORAL 2 TIMES DAILY
Status: DISCONTINUED | OUTPATIENT
Start: 2021-08-21 | End: 2021-08-21

## 2021-08-21 RX ORDER — SODIUM CHLORIDE 9 MG/ML
INJECTION, SOLUTION INTRAVENOUS
Status: COMPLETED | OUTPATIENT
Start: 2021-08-21 | End: 2021-08-21

## 2021-08-21 RX ORDER — ATORVASTATIN CALCIUM 40 MG/1
1 TABLET, FILM COATED ORAL NIGHTLY
COMMUNITY
Start: 2021-07-21

## 2021-08-21 RX ORDER — FLUTICASONE PROPIONATE 50 MCG
SPRAY, SUSPENSION (ML) NASAL
COMMUNITY
Start: 2021-05-24 | End: 2022-05-29

## 2021-08-21 RX ADMIN — SODIUM CHLORIDE 500 ML/HR: 0.9 INJECTION, SOLUTION INTRAVENOUS at 07:08

## 2021-08-24 LAB
E COLI SXT1 STL QL IA: NEGATIVE
E COLI SXT2 STL QL IA: NEGATIVE

## 2021-08-25 LAB — BACTERIA STL CULT: NORMAL

## 2022-05-04 ENCOUNTER — HOSPITAL ENCOUNTER (EMERGENCY)
Facility: HOSPITAL | Age: 82
Discharge: HOME | End: 2022-05-04
Attending: EMERGENCY MEDICINE
Payer: OTHER GOVERNMENT

## 2022-05-04 VITALS
WEIGHT: 205 LBS | OXYGEN SATURATION: 97 % | HEIGHT: 67 IN | TEMPERATURE: 99 F | BODY MASS INDEX: 32.18 KG/M2 | HEART RATE: 86 BPM | DIASTOLIC BLOOD PRESSURE: 72 MMHG | RESPIRATION RATE: 18 BRPM | SYSTOLIC BLOOD PRESSURE: 128 MMHG

## 2022-05-04 DIAGNOSIS — R53.83 FATIGUE: ICD-10-CM

## 2022-05-04 DIAGNOSIS — R53.83 FATIGUE, UNSPECIFIED TYPE: Primary | ICD-10-CM

## 2022-05-04 LAB
ALBUMIN SERPL BCP-MCNC: 3.7 G/DL (ref 3.5–5.2)
ALP SERPL-CCNC: 88 U/L (ref 55–135)
ALT SERPL W/O P-5'-P-CCNC: 15 U/L (ref 10–44)
ANION GAP SERPL CALC-SCNC: 12 MMOL/L (ref 8–16)
AST SERPL-CCNC: 13 U/L (ref 10–40)
BASOPHILS # BLD AUTO: 0.04 K/UL (ref 0–0.2)
BASOPHILS NFR BLD: 0.6 % (ref 0–1.9)
BILIRUB SERPL-MCNC: 0.6 MG/DL (ref 0.1–1)
BUN SERPL-MCNC: 19 MG/DL (ref 8–23)
CALCIUM SERPL-MCNC: 9.5 MG/DL (ref 8.7–10.5)
CHLORIDE SERPL-SCNC: 109 MMOL/L (ref 95–110)
CO2 SERPL-SCNC: 23 MMOL/L (ref 23–29)
CREAT SERPL-MCNC: 1.5 MG/DL (ref 0.5–1.4)
DIFFERENTIAL METHOD: ABNORMAL
EOSINOPHIL # BLD AUTO: 0.2 K/UL (ref 0–0.5)
EOSINOPHIL NFR BLD: 2.8 % (ref 0–8)
ERYTHROCYTE [DISTWIDTH] IN BLOOD BY AUTOMATED COUNT: 12.8 % (ref 11.5–14.5)
EST. GFR  (AFRICAN AMERICAN): 50.5 ML/MIN/1.73 M^2
EST. GFR  (NON AFRICAN AMERICAN): 43.7 ML/MIN/1.73 M^2
GLUCOSE SERPL-MCNC: 150 MG/DL (ref 70–110)
HCT VFR BLD AUTO: 38.7 % (ref 40–54)
HGB BLD-MCNC: 12.9 G/DL (ref 14–18)
IMM GRANULOCYTES # BLD AUTO: 0.03 K/UL (ref 0–0.04)
IMM GRANULOCYTES NFR BLD AUTO: 0.5 % (ref 0–0.5)
INFLUENZA A, MOLECULAR: NEGATIVE
INFLUENZA B, MOLECULAR: NEGATIVE
LYMPHOCYTES # BLD AUTO: 1.2 K/UL (ref 1–4.8)
LYMPHOCYTES NFR BLD: 18.2 % (ref 18–48)
MCH RBC QN AUTO: 30.6 PG (ref 27–31)
MCHC RBC AUTO-ENTMCNC: 33.3 G/DL (ref 32–36)
MCV RBC AUTO: 92 FL (ref 82–98)
MONOCYTES # BLD AUTO: 0.6 K/UL (ref 0.3–1)
MONOCYTES NFR BLD: 9 % (ref 4–15)
NEUTROPHILS # BLD AUTO: 4.4 K/UL (ref 1.8–7.7)
NEUTROPHILS NFR BLD: 68.9 % (ref 38–73)
NRBC BLD-RTO: 0 /100 WBC
PLATELET # BLD AUTO: 144 K/UL (ref 150–450)
PMV BLD AUTO: 11 FL (ref 9.2–12.9)
POTASSIUM SERPL-SCNC: 3.8 MMOL/L (ref 3.5–5.1)
PROT SERPL-MCNC: 7.4 G/DL (ref 6–8.4)
RBC # BLD AUTO: 4.21 M/UL (ref 4.6–6.2)
SARS-COV-2 RDRP RESP QL NAA+PROBE: NEGATIVE
SODIUM SERPL-SCNC: 144 MMOL/L (ref 136–145)
SPECIMEN SOURCE: NORMAL
TROPONIN I SERPL DL<=0.01 NG/ML-MCNC: <0.006 NG/ML (ref 0–0.03)
WBC # BLD AUTO: 6.32 K/UL (ref 3.9–12.7)

## 2022-05-04 PROCEDURE — 80053 COMPREHEN METABOLIC PANEL: CPT | Performed by: EMERGENCY MEDICINE

## 2022-05-04 PROCEDURE — 36415 COLL VENOUS BLD VENIPUNCTURE: CPT | Performed by: EMERGENCY MEDICINE

## 2022-05-04 PROCEDURE — 84484 ASSAY OF TROPONIN QUANT: CPT | Performed by: EMERGENCY MEDICINE

## 2022-05-04 PROCEDURE — 93005 ELECTROCARDIOGRAM TRACING: CPT

## 2022-05-04 PROCEDURE — 93010 EKG 12-LEAD: ICD-10-PCS | Mod: ,,, | Performed by: INTERNAL MEDICINE

## 2022-05-04 PROCEDURE — 99284 EMERGENCY DEPT VISIT MOD MDM: CPT | Mod: 25

## 2022-05-04 PROCEDURE — 87502 INFLUENZA DNA AMP PROBE: CPT | Performed by: EMERGENCY MEDICINE

## 2022-05-04 PROCEDURE — 93010 ELECTROCARDIOGRAM REPORT: CPT | Mod: ,,, | Performed by: INTERNAL MEDICINE

## 2022-05-04 PROCEDURE — 85025 COMPLETE CBC W/AUTO DIFF WBC: CPT | Performed by: EMERGENCY MEDICINE

## 2022-05-04 PROCEDURE — U0002 COVID-19 LAB TEST NON-CDC: HCPCS | Performed by: EMERGENCY MEDICINE
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
## 2022-05-04 NOTE — ED NOTES
Patient states that today he began to feel weak and have some dizziness. Pt states that he woke up this morning not feeling well. Pt states that he does have a headache and jaw pain on the left side due to several broken teeth. Pt states that he felt like he was going to pass out. Pt denies any chest pain or other symptoms at this time. Pt states that he does having heart burn.

## 2022-09-07 ENCOUNTER — HOSPITAL ENCOUNTER (OUTPATIENT)
Facility: HOSPITAL | Age: 82
Discharge: HOME | End: 2022-09-09
Attending: EMERGENCY MEDICINE | Admitting: HOSPITALIST
Payer: OTHER GOVERNMENT

## 2022-09-07 DIAGNOSIS — R06.02 SOB (SHORTNESS OF BREATH): ICD-10-CM

## 2022-09-07 DIAGNOSIS — E83.42 HYPOMAGNESEMIA: ICD-10-CM

## 2022-09-07 DIAGNOSIS — J18.9 PNEUMONIA OF RIGHT MIDDLE LOBE DUE TO INFECTIOUS ORGANISM: Primary | ICD-10-CM

## 2022-09-07 DIAGNOSIS — R19.7 DIARRHEA, UNSPECIFIED TYPE: ICD-10-CM

## 2022-09-07 LAB
ALBUMIN SERPL BCP-MCNC: 3.2 G/DL (ref 3.5–5.2)
ALP SERPL-CCNC: 76 U/L (ref 55–135)
ALT SERPL W/O P-5'-P-CCNC: 18 U/L (ref 10–44)
ANION GAP SERPL CALC-SCNC: 15 MMOL/L (ref 8–16)
AST SERPL-CCNC: 20 U/L (ref 10–40)
BASOPHILS # BLD AUTO: 0.02 K/UL (ref 0–0.2)
BASOPHILS NFR BLD: 0.2 % (ref 0–1.9)
BILIRUB SERPL-MCNC: 0.8 MG/DL (ref 0.1–1)
BUN SERPL-MCNC: 17 MG/DL (ref 8–23)
CALCIUM SERPL-MCNC: 8.6 MG/DL (ref 8.7–10.5)
CHLORIDE SERPL-SCNC: 96 MMOL/L (ref 95–110)
CO2 SERPL-SCNC: 22 MMOL/L (ref 23–29)
CREAT SERPL-MCNC: 1.2 MG/DL (ref 0.5–1.4)
DIFFERENTIAL METHOD: ABNORMAL
EOSINOPHIL # BLD AUTO: 0 K/UL (ref 0–0.5)
EOSINOPHIL NFR BLD: 0.2 % (ref 0–8)
ERYTHROCYTE [DISTWIDTH] IN BLOOD BY AUTOMATED COUNT: 12.4 % (ref 11.5–14.5)
EST. GFR  (NO RACE VARIABLE): >60 ML/MIN/1.73 M^2
GLUCOSE SERPL-MCNC: 176 MG/DL (ref 70–110)
HCT VFR BLD AUTO: 36.8 % (ref 40–54)
HGB BLD-MCNC: 12.5 G/DL (ref 14–18)
IMM GRANULOCYTES # BLD AUTO: 0.04 K/UL (ref 0–0.04)
IMM GRANULOCYTES NFR BLD AUTO: 0.4 % (ref 0–0.5)
INFLUENZA A, MOLECULAR: NEGATIVE
INFLUENZA B, MOLECULAR: NEGATIVE
LACTATE SERPL-SCNC: 1.1 MMOL/L (ref 0.5–2.2)
LIPASE SERPL-CCNC: 32 U/L (ref 4–60)
LYMPHOCYTES # BLD AUTO: 0.7 K/UL (ref 1–4.8)
LYMPHOCYTES NFR BLD: 8.2 % (ref 18–48)
MAGNESIUM SERPL-MCNC: 1.4 MG/DL (ref 1.6–2.6)
MCH RBC QN AUTO: 30.1 PG (ref 27–31)
MCHC RBC AUTO-ENTMCNC: 34 G/DL (ref 32–36)
MCV RBC AUTO: 89 FL (ref 82–98)
MONOCYTES # BLD AUTO: 0.7 K/UL (ref 0.3–1)
MONOCYTES NFR BLD: 7.6 % (ref 4–15)
NEUTROPHILS # BLD AUTO: 7.4 K/UL (ref 1.8–7.7)
NEUTROPHILS NFR BLD: 83.4 % (ref 38–73)
NRBC BLD-RTO: 0 /100 WBC
PLATELET # BLD AUTO: 183 K/UL (ref 150–450)
PMV BLD AUTO: 10.1 FL (ref 9.2–12.9)
POTASSIUM SERPL-SCNC: 3.9 MMOL/L (ref 3.5–5.1)
PROT SERPL-MCNC: 7.4 G/DL (ref 6–8.4)
RBC # BLD AUTO: 4.15 M/UL (ref 4.6–6.2)
SARS-COV-2 RDRP RESP QL NAA+PROBE: NEGATIVE
SODIUM SERPL-SCNC: 133 MMOL/L (ref 136–145)
SPECIMEN SOURCE: NORMAL
WBC # BLD AUTO: 8.92 K/UL (ref 3.9–12.7)

## 2022-09-07 PROCEDURE — 71045 XR CHEST AP PORTABLE: ICD-10-PCS | Mod: 26,,, | Performed by: RADIOLOGY

## 2022-09-07 PROCEDURE — U0002 COVID-19 LAB TEST NON-CDC: HCPCS | Performed by: EMERGENCY MEDICINE

## 2022-09-07 PROCEDURE — 71260 CT CHEST ABDOMEN PELVIS WITH CONTRAST (XPD): ICD-10-PCS | Mod: 26,,, | Performed by: RADIOLOGY

## 2022-09-07 PROCEDURE — 74177 CT CHEST ABDOMEN PELVIS WITH CONTRAST (XPD): ICD-10-PCS | Mod: 26,,, | Performed by: RADIOLOGY

## 2022-09-07 PROCEDURE — 71260 CT THORAX DX C+: CPT | Mod: TC

## 2022-09-07 PROCEDURE — 74177 CT ABD & PELVIS W/CONTRAST: CPT | Mod: 26,,, | Performed by: RADIOLOGY

## 2022-09-07 PROCEDURE — 93010 EKG 12-LEAD: ICD-10-PCS | Mod: ,,, | Performed by: INTERNAL MEDICINE

## 2022-09-07 PROCEDURE — 83690 ASSAY OF LIPASE: CPT | Performed by: EMERGENCY MEDICINE

## 2022-09-07 PROCEDURE — 96375 TX/PRO/DX INJ NEW DRUG ADDON: CPT | Mod: 59

## 2022-09-07 PROCEDURE — 83735 ASSAY OF MAGNESIUM: CPT | Performed by: EMERGENCY MEDICINE

## 2022-09-07 PROCEDURE — 85025 COMPLETE CBC W/AUTO DIFF WBC: CPT | Performed by: EMERGENCY MEDICINE

## 2022-09-07 PROCEDURE — 93010 ELECTROCARDIOGRAM REPORT: CPT | Mod: ,,, | Performed by: INTERNAL MEDICINE

## 2022-09-07 PROCEDURE — 87040 BLOOD CULTURE FOR BACTERIA: CPT | Performed by: EMERGENCY MEDICINE

## 2022-09-07 PROCEDURE — 71045 X-RAY EXAM CHEST 1 VIEW: CPT | Mod: 26,,, | Performed by: RADIOLOGY

## 2022-09-07 PROCEDURE — 80053 COMPREHEN METABOLIC PANEL: CPT | Performed by: EMERGENCY MEDICINE

## 2022-09-07 PROCEDURE — 94640 AIRWAY INHALATION TREATMENT: CPT

## 2022-09-07 PROCEDURE — 99285 EMERGENCY DEPT VISIT HI MDM: CPT | Mod: 25

## 2022-09-07 PROCEDURE — 87502 INFLUENZA DNA AMP PROBE: CPT | Performed by: EMERGENCY MEDICINE

## 2022-09-07 PROCEDURE — G0378 HOSPITAL OBSERVATION PER HR: HCPCS

## 2022-09-07 PROCEDURE — 81000 URINALYSIS NONAUTO W/SCOPE: CPT | Performed by: EMERGENCY MEDICINE

## 2022-09-07 PROCEDURE — 63600175 PHARM REV CODE 636 W HCPCS: Performed by: EMERGENCY MEDICINE

## 2022-09-07 PROCEDURE — 96361 HYDRATE IV INFUSION ADD-ON: CPT

## 2022-09-07 PROCEDURE — 71260 CT THORAX DX C+: CPT | Mod: 26,,, | Performed by: RADIOLOGY

## 2022-09-07 PROCEDURE — 93005 ELECTROCARDIOGRAM TRACING: CPT

## 2022-09-07 PROCEDURE — 96366 THER/PROPH/DIAG IV INF ADDON: CPT

## 2022-09-07 PROCEDURE — 96365 THER/PROPH/DIAG IV INF INIT: CPT

## 2022-09-07 PROCEDURE — 83605 ASSAY OF LACTIC ACID: CPT | Performed by: EMERGENCY MEDICINE

## 2022-09-07 PROCEDURE — 25000003 PHARM REV CODE 250: Performed by: EMERGENCY MEDICINE

## 2022-09-07 PROCEDURE — 25500020 PHARM REV CODE 255: Performed by: EMERGENCY MEDICINE

## 2022-09-07 PROCEDURE — 74177 CT ABD & PELVIS W/CONTRAST: CPT | Mod: TC

## 2022-09-07 PROCEDURE — 25000242 PHARM REV CODE 250 ALT 637 W/ HCPCS: Performed by: EMERGENCY MEDICINE

## 2022-09-07 PROCEDURE — 71045 X-RAY EXAM CHEST 1 VIEW: CPT | Mod: TC

## 2022-09-07 RX ORDER — MORPHINE SULFATE 4 MG/ML
4 INJECTION, SOLUTION INTRAMUSCULAR; INTRAVENOUS
Status: COMPLETED | OUTPATIENT
Start: 2022-09-07 | End: 2022-09-07

## 2022-09-07 RX ORDER — ACETAMINOPHEN 500 MG
1000 TABLET ORAL
Status: COMPLETED | OUTPATIENT
Start: 2022-09-07 | End: 2022-09-07

## 2022-09-07 RX ORDER — ONDANSETRON 2 MG/ML
4 INJECTION INTRAMUSCULAR; INTRAVENOUS
Status: COMPLETED | OUTPATIENT
Start: 2022-09-07 | End: 2022-09-07

## 2022-09-07 RX ORDER — MAGNESIUM SULFATE HEPTAHYDRATE 40 MG/ML
2 INJECTION, SOLUTION INTRAVENOUS
Status: COMPLETED | OUTPATIENT
Start: 2022-09-07 | End: 2022-09-07

## 2022-09-07 RX ORDER — LEVOFLOXACIN 5 MG/ML
750 INJECTION, SOLUTION INTRAVENOUS
Status: COMPLETED | OUTPATIENT
Start: 2022-09-07 | End: 2022-09-07

## 2022-09-07 RX ORDER — IPRATROPIUM BROMIDE AND ALBUTEROL SULFATE 2.5; .5 MG/3ML; MG/3ML
3 SOLUTION RESPIRATORY (INHALATION)
Status: COMPLETED | OUTPATIENT
Start: 2022-09-07 | End: 2022-09-07

## 2022-09-07 RX ADMIN — SODIUM CHLORIDE 1000 ML: 0.9 INJECTION, SOLUTION INTRAVENOUS at 08:09

## 2022-09-07 RX ADMIN — ACETAMINOPHEN 1000 MG: 500 TABLET ORAL at 08:09

## 2022-09-07 RX ADMIN — IOHEXOL 100 ML: 350 INJECTION, SOLUTION INTRAVENOUS at 10:09

## 2022-09-07 RX ADMIN — MAGNESIUM SULFATE 2 G: 2 INJECTION INTRAVENOUS at 09:09

## 2022-09-07 RX ADMIN — IPRATROPIUM BROMIDE AND ALBUTEROL SULFATE 3 ML: 2.5; .5 SOLUTION RESPIRATORY (INHALATION) at 09:09

## 2022-09-07 RX ADMIN — MORPHINE SULFATE 4 MG: 4 INJECTION INTRAVENOUS at 09:09

## 2022-09-07 RX ADMIN — LEVOFLOXACIN 750 MG: 750 INJECTION, SOLUTION INTRAVENOUS at 09:09

## 2022-09-07 RX ADMIN — ONDANSETRON 4 MG: 2 INJECTION INTRAMUSCULAR; INTRAVENOUS at 09:09

## 2022-09-08 PROBLEM — E78.5 HYPERLIPIDEMIA: Status: ACTIVE | Noted: 2022-09-08

## 2022-09-08 LAB
ANION GAP SERPL CALC-SCNC: 13 MMOL/L (ref 8–16)
BACTERIA #/AREA URNS HPF: ABNORMAL /HPF
BASOPHILS # BLD AUTO: 0.01 K/UL (ref 0–0.2)
BASOPHILS NFR BLD: 0.1 % (ref 0–1.9)
BILIRUB UR QL STRIP: NEGATIVE
BUN SERPL-MCNC: 16 MG/DL (ref 8–23)
CALCIUM SERPL-MCNC: 8.1 MG/DL (ref 8.7–10.5)
CHLORIDE SERPL-SCNC: 98 MMOL/L (ref 95–110)
CLARITY UR: CLEAR
CO2 SERPL-SCNC: 22 MMOL/L (ref 23–29)
COLOR UR: YELLOW
CREAT SERPL-MCNC: 1.1 MG/DL (ref 0.5–1.4)
DIFFERENTIAL METHOD: ABNORMAL
EOSINOPHIL # BLD AUTO: 0 K/UL (ref 0–0.5)
EOSINOPHIL NFR BLD: 0.3 % (ref 0–8)
ERYTHROCYTE [DISTWIDTH] IN BLOOD BY AUTOMATED COUNT: 12.5 % (ref 11.5–14.5)
EST. GFR  (NO RACE VARIABLE): >60 ML/MIN/1.73 M^2
GLUCOSE SERPL-MCNC: 188 MG/DL (ref 70–110)
GLUCOSE UR QL STRIP: NEGATIVE
HCT VFR BLD AUTO: 32.8 % (ref 40–54)
HGB BLD-MCNC: 11.3 G/DL (ref 14–18)
HGB UR QL STRIP: NEGATIVE
HYALINE CASTS #/AREA URNS LPF: 2 /LPF
IMM GRANULOCYTES # BLD AUTO: 0.05 K/UL (ref 0–0.04)
IMM GRANULOCYTES NFR BLD AUTO: 0.7 % (ref 0–0.5)
KETONES UR QL STRIP: NEGATIVE
LEUKOCYTE ESTERASE UR QL STRIP: NEGATIVE
LYMPHOCYTES # BLD AUTO: 0.6 K/UL (ref 1–4.8)
LYMPHOCYTES NFR BLD: 8 % (ref 18–48)
MAGNESIUM SERPL-MCNC: 1.8 MG/DL (ref 1.6–2.6)
MCH RBC QN AUTO: 30.5 PG (ref 27–31)
MCHC RBC AUTO-ENTMCNC: 34.5 G/DL (ref 32–36)
MCV RBC AUTO: 88 FL (ref 82–98)
MICROSCOPIC COMMENT: ABNORMAL
MONOCYTES # BLD AUTO: 0.9 K/UL (ref 0.3–1)
MONOCYTES NFR BLD: 11.7 % (ref 4–15)
NEUTROPHILS # BLD AUTO: 6 K/UL (ref 1.8–7.7)
NEUTROPHILS NFR BLD: 79.2 % (ref 38–73)
NITRITE UR QL STRIP: NEGATIVE
NRBC BLD-RTO: 0 /100 WBC
PH UR STRIP: 6 [PH] (ref 5–8)
PHOSPHATE SERPL-MCNC: 2.7 MG/DL (ref 2.7–4.5)
PLATELET # BLD AUTO: 156 K/UL (ref 150–450)
PMV BLD AUTO: 10 FL (ref 9.2–12.9)
POTASSIUM SERPL-SCNC: 3.7 MMOL/L (ref 3.5–5.1)
PROT UR QL STRIP: ABNORMAL
RBC # BLD AUTO: 3.71 M/UL (ref 4.6–6.2)
RBC #/AREA URNS HPF: 0 /HPF (ref 0–4)
SODIUM SERPL-SCNC: 133 MMOL/L (ref 136–145)
SP GR UR STRIP: 1.01 (ref 1–1.03)
SQUAMOUS #/AREA URNS HPF: 1 /HPF
URN SPEC COLLECT METH UR: ABNORMAL
UROBILINOGEN UR STRIP-ACNC: 1 EU/DL
WBC # BLD AUTO: 7.52 K/UL (ref 3.9–12.7)
WBC #/AREA URNS HPF: 1 /HPF (ref 0–5)

## 2022-09-08 PROCEDURE — 99220 PR INITIAL OBSERVATION CARE,LEVL III: CPT | Mod: 95,,, | Performed by: HOSPITALIST

## 2022-09-08 PROCEDURE — 25000003 PHARM REV CODE 250: Performed by: FAMILY MEDICINE

## 2022-09-08 PROCEDURE — 84100 ASSAY OF PHOSPHORUS: CPT | Performed by: HOSPITALIST

## 2022-09-08 PROCEDURE — 94761 N-INVAS EAR/PLS OXIMETRY MLT: CPT

## 2022-09-08 PROCEDURE — 97162 PT EVAL MOD COMPLEX 30 MIN: CPT

## 2022-09-08 PROCEDURE — 83735 ASSAY OF MAGNESIUM: CPT | Performed by: HOSPITALIST

## 2022-09-08 PROCEDURE — 96372 THER/PROPH/DIAG INJ SC/IM: CPT | Performed by: HOSPITALIST

## 2022-09-08 PROCEDURE — 25000242 PHARM REV CODE 250 ALT 637 W/ HCPCS: Performed by: HOSPITALIST

## 2022-09-08 PROCEDURE — 85025 COMPLETE CBC W/AUTO DIFF WBC: CPT | Performed by: HOSPITALIST

## 2022-09-08 PROCEDURE — 63600175 PHARM REV CODE 636 W HCPCS: Performed by: HOSPITALIST

## 2022-09-08 PROCEDURE — G0378 HOSPITAL OBSERVATION PER HR: HCPCS

## 2022-09-08 PROCEDURE — 94640 AIRWAY INHALATION TREATMENT: CPT

## 2022-09-08 PROCEDURE — 25000003 PHARM REV CODE 250: Performed by: HOSPITALIST

## 2022-09-08 PROCEDURE — 99220 PR INITIAL OBSERVATION CARE,LEVL III: ICD-10-PCS | Mod: 95,,, | Performed by: HOSPITALIST

## 2022-09-08 PROCEDURE — 80048 BASIC METABOLIC PNL TOTAL CA: CPT | Performed by: HOSPITALIST

## 2022-09-08 PROCEDURE — 96366 THER/PROPH/DIAG IV INF ADDON: CPT

## 2022-09-08 RX ORDER — ENOXAPARIN SODIUM 100 MG/ML
40 INJECTION SUBCUTANEOUS EVERY 24 HOURS
Status: DISCONTINUED | OUTPATIENT
Start: 2022-09-08 | End: 2022-09-09 | Stop reason: HOSPADM

## 2022-09-08 RX ORDER — ATORVASTATIN CALCIUM 40 MG/1
40 TABLET, FILM COATED ORAL NIGHTLY
Status: DISCONTINUED | OUTPATIENT
Start: 2022-09-08 | End: 2022-09-09 | Stop reason: HOSPADM

## 2022-09-08 RX ORDER — IPRATROPIUM BROMIDE AND ALBUTEROL SULFATE 2.5; .5 MG/3ML; MG/3ML
3 SOLUTION RESPIRATORY (INHALATION) EVERY 4 HOURS PRN
Status: DISCONTINUED | OUTPATIENT
Start: 2022-09-08 | End: 2022-09-09 | Stop reason: HOSPADM

## 2022-09-08 RX ORDER — LEVOFLOXACIN 5 MG/ML
750 INJECTION, SOLUTION INTRAVENOUS
Status: DISCONTINUED | OUTPATIENT
Start: 2022-09-08 | End: 2022-09-09 | Stop reason: HOSPADM

## 2022-09-08 RX ORDER — ONDANSETRON 4 MG/1
4 TABLET, ORALLY DISINTEGRATING ORAL EVERY 6 HOURS PRN
Status: DISCONTINUED | OUTPATIENT
Start: 2022-09-08 | End: 2022-09-09 | Stop reason: HOSPADM

## 2022-09-08 RX ORDER — ACETAMINOPHEN 325 MG/1
650 TABLET ORAL EVERY 4 HOURS PRN
Status: DISCONTINUED | OUTPATIENT
Start: 2022-09-08 | End: 2022-09-09 | Stop reason: HOSPADM

## 2022-09-08 RX ORDER — SODIUM CHLORIDE 0.9 % (FLUSH) 0.9 %
10 SYRINGE (ML) INJECTION EVERY 12 HOURS PRN
Status: DISCONTINUED | OUTPATIENT
Start: 2022-09-08 | End: 2022-09-09 | Stop reason: HOSPADM

## 2022-09-08 RX ADMIN — LEVOFLOXACIN 750 MG: 750 INJECTION, SOLUTION INTRAVENOUS at 09:09

## 2022-09-08 RX ADMIN — ATORVASTATIN CALCIUM 40 MG: 40 TABLET, FILM COATED ORAL at 09:09

## 2022-09-08 RX ADMIN — ACETAMINOPHEN 650 MG: 325 TABLET ORAL at 09:09

## 2022-09-08 RX ADMIN — IPRATROPIUM BROMIDE AND ALBUTEROL SULFATE 3 ML: 2.5; .5 SOLUTION RESPIRATORY (INHALATION) at 08:09

## 2022-09-08 RX ADMIN — ACETAMINOPHEN 650 MG: 325 TABLET ORAL at 05:09

## 2022-09-08 RX ADMIN — ACETAMINOPHEN 650 MG: 325 TABLET ORAL at 10:09

## 2022-09-08 RX ADMIN — ENOXAPARIN SODIUM 40 MG: 40 INJECTION SUBCUTANEOUS at 05:09

## 2022-09-08 RX ADMIN — ACETAMINOPHEN 650 MG: 325 TABLET ORAL at 03:09

## 2022-09-08 NOTE — PROGRESS NOTES
[facility]  Progress Note    Patient Name: Larry Lepley  MRN: [id number]  Patient Class: OP- Observation   Admission Date: 9/7/2022  Length of Stay: 0 days  Attending Physician: Savannah Luo MD  Primary Care Provider: [facility]        Subjective:     Principal Problem:Community acquired pneumonia        HPI:  The patient is an 81 y/o male with PMH of HTN who presents with SOB and weakness. He reports fevers, chills, headahces, and diarrhea. He was negative for covid but work up shows right sided pneumonia. He denies any CP, bloody stools, or other symptoms.       Overview/Hospital Course:  No notes on file    Interval History: no acute events overnight    Review of Systems   Constitutional:  Positive for fatigue. Negative for fever.   HENT:  Positive for congestion.    Eyes:  Negative for visual disturbance.   Respiratory:  Positive for cough and shortness of breath.    Cardiovascular:  Negative for chest pain.   Gastrointestinal:  Negative for abdominal pain.   Endocrine: Negative.    Genitourinary: Negative.    Musculoskeletal: Negative.    Skin: Negative.    Allergic/Immunologic: Negative.    Neurological: Negative.    Hematological: Negative.    Psychiatric/Behavioral: Negative.     Objective:     Vital Signs (Most Recent):  Temp: 97.8 °F (36.6 °C) (09/08/22 1246)  Pulse: 85 (09/08/22 1246)  Resp: 19 (09/08/22 1246)  BP: (!) 163/88 (09/08/22 1246)  SpO2: (!) 92 % (09/08/22 1246)   Vital Signs (24h Range):  Temp:  [97.8 °F (36.6 °C)-102.4 °F (39.1 °C)] 97.8 °F (36.6 °C)  Pulse:  [] 85  Resp:  [18-30] 19  SpO2:  [91 %-99 %] 92 %  BP: (101-163)/() 163/88     Weight: 93.5 kg (206 lb 2.1 oz)  Body mass index is 32.28 kg/m².    Intake/Output Summary (Last 24 hours) at 9/8/2022 1344  Last data filed at 9/8/2022 0800  Gross per 24 hour   Intake 720 ml   Output --   Net 720 ml      Physical Exam  Vitals reviewed.   Constitutional:       General: He is  not in acute distress.     Appearance: He is ill-appearing. He is not toxic-appearing or diaphoretic.   HENT:      Head: Normocephalic and atraumatic.      Right Ear: External ear normal.      Left Ear: External ear normal.      Nose: Nose normal.      Mouth/Throat:      Mouth: Mucous membranes are moist.   Eyes:      Conjunctiva/sclera: Conjunctivae normal.   Cardiovascular:      Rate and Rhythm: Normal rate and regular rhythm.      Pulses: Normal pulses.      Heart sounds: No murmur heard.    No gallop.   Pulmonary:      Effort: Pulmonary effort is normal. No respiratory distress.      Breath sounds: Rales present. No wheezing.   Abdominal:      Palpations: Abdomen is soft.   Musculoskeletal:      Right lower leg: No edema.      Left lower leg: No edema.   Skin:     General: Skin is warm and dry.   Neurological:      Mental Status: He is alert. Mental status is at baseline.   Psychiatric:         Mood and Affect: Mood normal.       Significant Labs: All pertinent labs within the past 24 hours have been reviewed.  CBC:   Recent Labs   Lab 09/07/22 2042 09/08/22  0521   WBC 8.92 7.52   HGB 12.5* 11.3*   HCT 36.8* 32.8*    156     CMP:   Recent Labs   Lab 09/07/22 2042 09/08/22  0521   * 133*   K 3.9 3.7   CL 96 98   CO2 22* 22*   * 188*   BUN 17 16   CREATININE 1.2 1.1   CALCIUM 8.6* 8.1*   PROT 7.4  --    ALBUMIN 3.2*  --    BILITOT 0.8  --    ALKPHOS 76  --    AST 20  --    ALT 18  --    ANIONGAP 15 13       Significant Imaging: I have reviewed all pertinent imaging results/findings within the past 24 hours.  X-Ray Pelvis 3 view inc Hip 2 view Right   Final Result      1. 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   2. Remote ORIF and posttraumatic deformity of the left pelvis.         Electronically signed by: Simone Belle   Date:    09/08/2022   Time:    10:02      CT Chest Abdomen Pelvis With Contrast (xpd)   Final Result      Pneumonia in the right lung.  Right hilar and  mediastinal adenopathy, likely reactive, follow-up recommended.      No acute abnormality in the abdomen or pelvis.  Gallstones, left adrenal adenoma, fatty infiltration of the liver, colonic diverticula.      Small nodules in the left lung measuring up to 5 mm in size not clearly seen on the 2014 study.  For multiple solid nodules all <6 mm, Fleischner Society 2017 guidelines recommend no routine follow up for a low risk patient, or follow up with non-contrast chest CT at 12 months after discovery in a high risk patient.         Electronically signed by: Tessa Leon   Date:    09/08/2022   Time:    08:30      X-Ray Chest AP Portable   Final Result      Findings concerning for pneumonia in the right lung.         Electronically signed by: Tessa Leon   Date:    09/08/2022   Time:    08:16              Assessment/Plan:      * Community acquired pneumonia  Continue levaquin  Oxygen PRN   duonebs - may need to be changed to scheduled  Febrile since admission         Hyperlipidemia  Continue atorvastatin         VTE Risk Mitigation (From admission, onward)         Ordered     enoxaparin injection 40 mg  Daily         09/08/22 0125     IP VTE HIGH RISK PATIENT  Once         09/08/22 0125     Place sequential compression device  Until discontinued         09/08/22 0125                Discharge Planning   SUAD:      Code Status: Full Code   Is the patient medically ready for discharge?:     Reason for patient still in hospital (select all that apply): Treatment                     Sabrina Huerta MD  Department of Hospital Medicine   [facility]

## 2022-09-08 NOTE — SUBJECTIVE & OBJECTIVE
Past Medical History:   Diagnosis Date    Hypertension        Past Surgical History:   Procedure Laterality Date    CARPAL TUNNEL RELEASE Bilateral     HIP ARTHROPLASTY Left        Review of patient's allergies indicates:   Allergen Reactions    Aspirin (bulk) Swelling    Sulfa (sulfonamide antibiotics)        No current facility-administered medications on file prior to encounter.     Current Outpatient Medications on File Prior to Encounter   Medication Sig    acetaminophen (TYLENOL) 500 MG tablet Take 2 tablets (1,000 mg total) by mouth every 6 (six) hours as needed for Pain or Temperature greater than (or equal to 101 degree F).    atorvastatin (LIPITOR) 40 MG tablet Take 1 tablet by mouth nightly.    pantoprazole (PROTONIX) 40 MG tablet Take 1 tablet (40 mg total) by mouth once daily.    doxycycline (VIBRA-TABS) 100 MG tablet Take 1 tablet (100 mg total) by mouth 2 (two) times daily.    guaiFENesin (MUCINEX) 600 mg 12 hr tablet Take 1 tablet (600 mg total) by mouth 2 (two) times daily as needed for Congestion.    hydrocodone-acetaminophen 5-325mg (NORCO) 5-325 mg per tablet Take 1 tablet by mouth every 4 (four) hours as needed.    ipratropium-albuteroL (COMBIVENT)  mcg/actuation inhaler Inhale 2 puffs into the lungs every 6 (six) hours as needed for Wheezing. Rescue    LISINOPRIL ORAL Take 10 mg by mouth once daily.     omeprazole (PRILOSEC) 20 MG capsule Take 1 capsule by mouth once daily.     Family History       Problem Relation (Age of Onset)    No Known Problems Mother, Father          Tobacco Use    Smoking status: Former     Types: Cigarettes     Quit date: 1997     Years since quittin.4    Smokeless tobacco: Never   Substance and Sexual Activity    Alcohol use: No    Drug use: No    Sexual activity: Not Currently     Review of Systems   Constitutional:  Positive for fatigue and fever. Negative for activity change, appetite change and chills.   HENT:  Negative for congestion, rhinorrhea,  sneezing and sore throat.    Eyes:  Negative for photophobia and visual disturbance.   Respiratory:  Negative for cough, shortness of breath and wheezing.    Cardiovascular:  Negative for chest pain, palpitations and leg swelling.   Gastrointestinal:  Positive for diarrhea. Negative for abdominal pain, constipation, nausea and vomiting.   Endocrine: Negative for polydipsia and polyuria.   Genitourinary:  Negative for difficulty urinating, frequency and hematuria.   Musculoskeletal:  Positive for myalgias. Negative for arthralgias.   Skin:  Negative for rash and wound.   Neurological:  Negative for syncope, weakness, light-headedness and headaches.   Hematological:  Does not bruise/bleed easily.   Psychiatric/Behavioral:  Negative for confusion and hallucinations.    Objective:     Vital Signs (Most Recent):  Temp: 98.9 °F (37.2 °C) (09/07/22 2235)  Pulse: 88 (09/08/22 0033)  Resp: 19 (09/08/22 0015)  BP: 123/75 (09/08/22 0033)  SpO2: (!) 94 % (09/08/22 0033)   Vital Signs (24h Range):  Temp:  [98.9 °F (37.2 °C)-102.4 °F (39.1 °C)] 98.9 °F (37.2 °C)  Pulse:  [] 88  Resp:  [19-30] 19  SpO2:  [92 %-99 %] 94 %  BP: (101-143)/() 123/75     Weight: 93 kg (205 lb)  Body mass index is 32.11 kg/m².    Physical Exam  Vitals reviewed.   Constitutional:       General: He is not in acute distress.  HENT:      Head: Normocephalic and atraumatic.   Eyes:      General: No scleral icterus.  Cardiovascular:      Rate and Rhythm: Tachycardia present.   Pulmonary:      Effort: Pulmonary effort is normal. No respiratory distress.      Comments: On RA  Musculoskeletal:         General: No swelling.      Right lower leg: No edema.      Left lower leg: No edema.   Skin:     Coloration: Skin is not jaundiced.      Findings: No erythema.   Neurological:      Mental Status: He is alert and oriented to person, place, and time.           Significant Labs: All pertinent labs within the past 24 hours have been reviewed.    Significant  Imaging: I have reviewed all pertinent imaging results/findings within the past 24 hours.

## 2022-09-08 NOTE — H&P
[facility] Medicine  History & Physical    Patient Name: Larry Lepley  MRN: [id number]  Admission Date: 9/7/2022  Attending Physician: Savannah Luo MD   Primary Care Provider: [facility]         Patient information was obtained from patient and ER records.       Subjective:     Principal Problem:Community acquired pneumonia    Chief Complaint:   Chief Complaint   Patient presents with    COVID-19 Concerns     Pt reports to the ED via EMS with c/o of Diarrhea, fever, sob, and generalized malaise x2 days. Hx COPD.         HPI: The patient is an 83 y/o male with PMH of HTN who presents with SOB and weakness. He reports fevers, chills, headahces, and diarrhea. He was negative for covid but work up shows right sided pneumonia. He denies any CP, bloody stools, or other symptoms.       Past Medical History:   Diagnosis Date    Hypertension        Past Surgical History:   Procedure Laterality Date    CARPAL TUNNEL RELEASE Bilateral     HIP ARTHROPLASTY Left        Review of patient's allergies indicates:   Allergen Reactions    Aspirin (bulk) Swelling    Sulfa (sulfonamide antibiotics)        No current facility-administered medications on file prior to encounter.     Current Outpatient Medications on File Prior to Encounter   Medication Sig    acetaminophen (TYLENOL) 500 MG tablet Take 2 tablets (1,000 mg total) by mouth every 6 (six) hours as needed for Pain or Temperature greater than (or equal to 101 degree F).    atorvastatin (LIPITOR) 40 MG tablet Take 1 tablet by mouth nightly.    pantoprazole (PROTONIX) 40 MG tablet Take 1 tablet (40 mg total) by mouth once daily.    doxycycline (VIBRA-TABS) 100 MG tablet Take 1 tablet (100 mg total) by mouth 2 (two) times daily.    guaiFENesin (MUCINEX) 600 mg 12 hr tablet Take 1 tablet (600 mg total) by mouth 2 (two) times daily as needed for Congestion.    hydrocodone-acetaminophen 5-325mg (NORCO) 5-325 mg per tablet Take  1 tablet by mouth every 4 (four) hours as needed.    ipratropium-albuteroL (COMBIVENT)  mcg/actuation inhaler Inhale 2 puffs into the lungs every 6 (six) hours as needed for Wheezing. Rescue    LISINOPRIL ORAL Take 10 mg by mouth once daily.     omeprazole (PRILOSEC) 20 MG capsule Take 1 capsule by mouth once daily.     Family History       Problem Relation (Age of Onset)    No Known Problems Mother, Father          Tobacco Use    Smoking status: Former     Types: Cigarettes     Quit date: 1997     Years since quittin.4    Smokeless tobacco: Never   Substance and Sexual Activity    Alcohol use: No    Drug use: No    Sexual activity: Not Currently     Review of Systems   Constitutional:  Positive for fatigue and fever. Negative for activity change, appetite change and chills.   HENT:  Negative for congestion, rhinorrhea, sneezing and sore throat.    Eyes:  Negative for photophobia and visual disturbance.   Respiratory:  Negative for cough, shortness of breath and wheezing.    Cardiovascular:  Negative for chest pain, palpitations and leg swelling.   Gastrointestinal:  Positive for diarrhea. Negative for abdominal pain, constipation, nausea and vomiting.   Endocrine: Negative for polydipsia and polyuria.   Genitourinary:  Negative for difficulty urinating, frequency and hematuria.   Musculoskeletal:  Positive for myalgias. Negative for arthralgias.   Skin:  Negative for rash and wound.   Neurological:  Negative for syncope, weakness, light-headedness and headaches.   Hematological:  Does not bruise/bleed easily.   Psychiatric/Behavioral:  Negative for confusion and hallucinations.    Objective:     Vital Signs (Most Recent):  Temp: 98.9 °F (37.2 °C) (225)  Pulse: 88 (223)  Resp: 19 (22 0015)  BP: 123/75 (223)  SpO2: (!) 94 % (22)   Vital Signs (24h Range):  Temp:  [98.9 °F (37.2 °C)-102.4 °F (39.1 °C)] 98.9 °F (37.2 °C)  Pulse:  [] 88  Resp:   [19-30] 19  SpO2:  [92 %-99 %] 94 %  BP: (101-143)/() 123/75     Weight: 93 kg (205 lb)  Body mass index is 32.11 kg/m².    Physical Exam  Vitals reviewed.   Constitutional:       General: He is not in acute distress.  HENT:      Head: Normocephalic and atraumatic.   Eyes:      General: No scleral icterus.  Cardiovascular:      Rate and Rhythm: Tachycardia present.   Pulmonary:      Effort: Pulmonary effort is normal. No respiratory distress.      Comments: On RA  Musculoskeletal:         General: No swelling.      Right lower leg: No edema.      Left lower leg: No edema.   Skin:     Coloration: Skin is not jaundiced.      Findings: No erythema.   Neurological:      Mental Status: He is alert and oriented to person, place, and time.           Significant Labs: All pertinent labs within the past 24 hours have been reviewed.    Significant Imaging: I have reviewed all pertinent imaging results/findings within the past 24 hours.    Assessment/Plan:     * Community acquired pneumonia  Continue levaquin  Oxygen PRN   duoneb  Stable       Hyperlipidemia  Continue atorvastatin         VTE Risk Mitigation (From admission, onward)    None               The attending portion of this evaluation, treatment, and documentation was performed per Savannah Luo MD via Telemedicine AudioVisual using the secure [employer] software platform with 2 way audio/video. The provider was located off-site and the patient is located in the hospital. The aforementioned video software was utilized to document the relevant history and physical exam      Savannah Luo MD  Department of Hospital Medicine   [facility]

## 2022-09-08 NOTE — PT/OT/SLP EVAL
Physical Therapy Evaluation    Patient Name:  Larry Lepley   MRN:  [id number]    Recommendations:     Discharge Recommendations:  home, outpatient PT   Discharge Equipment Recommendations:  (assessment ongoing)   Barriers to discharge: None    Assessment:     Larry Lepley is a 82 y.o. male admitted with a medical diagnosis of Community acquired pneumonia.  He presents with the following impairments/functional limitations:  weakness, impaired endurance, gait instability, impaired balance, pain, decreased ROM.    Rehab Prognosis: Good; patient would benefit from acute skilled PT services to address these deficits and reach maximum level of function.    Recent Surgery: * No surgery found *      Plan:     During this hospitalization, patient to be seen  (3-5 x/wk) to address the identified rehab impairments via gait training, therapeutic activities, therapeutic exercises and progress toward the following goals:    Plan of Care Expires:   (upon discharge from hospital)    Subjective     Chief Complaint: pneumonia  Patient/Family Comments/goals: Patient stating he had a recent fall due to weakness in his left leg. Patient reports multiple ortho surgeries 43 years ago secondary to an accident. He had left EUNICE, left ankle fusion, and aaron in his femur.  Pain/Comfort:  Pain Rating 1:  (patient reporting pain in BLE's but did not provide a numerical pain rating)    Patients cultural, spiritual, Rastafari conflicts given the current situation: no    Living Environment:  Patient lives with his daughter, son-in-law, and grandchildren in a SS home with 2-3 steps and ramp to enter.  Prior to admission, patients level of function was mod I.  Equipment used at home: wheelchair, cane, straight, walker, rolling.  DME owned (not currently used): none.  Upon discharge, patient will have assistance from his family.    Objective:     Communicated with RN prior to session.  Patient found HOB elevated with peripheral IV, telemetry, avasys  upon PT entry to room.    General Precautions: Standard, fall   Orthopedic Precautions:N/A   Braces: N/A  Respiratory Status: Room air    Exams:  Cognitive Exam:  Patient is oriented to Person, Place, Time, and Situation  RLE ROM: WFL  RLE Strength: 4-/5  LLE ROM: WFL except ankle secondary to previous fusion  LLE Strength: 3+/5    Functional Mobility:  Bed Mobility:  Supine to Sit: stand by assistance  Sit to Supine: stand by assistance  Transfers:  Sit to Stand:  contact guard assistance with no AD  Gait: patient ambulated 50' with CGA for safety    Treatment and Education:  Patient performed bed mobility with SBA, sit to stand with CGA. Patient ambulated 50' with CGA for safety. Patient demonstrated antalgic gait pattern with increased deviation in path and limp on left. PT education in consistent use of AD for safe gait and decrease fall risk.   Patient educated in call light use, importance of OOB activity and functional mobility to negate the negative effects of prolonged bed rest during this hospitalization, safe transfers/ambulation and discharge planning recommendations/options.      AM-PAC 6 CLICK MOBILITY  Total Score:      Patient left HOB elevated with all lines intact, RN notified, and avasys in use .    GOALS:   Patient to be mod I with all aspects of bed mobility  Patient to require SBA for transfers using LRAD  Patient to ambulate >150' with SBA using LRAD    History:     Past Medical History:   Diagnosis Date    Hypertension        Past Surgical History:   Procedure Laterality Date    CARPAL TUNNEL RELEASE Bilateral     HIP ARTHROPLASTY Left        Time Tracking:     PT Received On: 09/08/22  PT Start Time: 1231     PT Stop Time: 1251  PT Total Time (min): 20 min     Billable Minutes: Evaluation 20 min      09/08/2022

## 2022-09-08 NOTE — ED PROVIDER NOTES
Encounter Date: 2022       History     Chief Complaint   Patient presents with    COVID-19 Concerns     Pt reports to the ED via EMS with c/o of Diarrhea, fever, sob, and generalized malaise x2 days. Hx COPD.      Pt here with c/o fever, chills, bodyaches, HA, gen weakness and diarrhea the past 2days. Mild cough. He denies abd pain but says it feels bloated. No urinary sxs. No recent abx. Watery stools x4 today. No rectal bleeding or melena. No known sick contacts.     The history is provided by the patient and the EMS personnel.   Review of patient's allergies indicates:   Allergen Reactions    Aspirin (bulk) Swelling    Sulfa (sulfonamide antibiotics)      Past Medical History:   Diagnosis Date    Hypertension      Past Surgical History:   Procedure Laterality Date    CARPAL TUNNEL RELEASE Bilateral     HIP ARTHROPLASTY Left      Family History   Problem Relation Age of Onset    No Known Problems Mother     No Known Problems Father      Social History     Tobacco Use    Smoking status: Former     Types: Cigarettes     Quit date: 1997     Years since quittin.4    Smokeless tobacco: Never   Substance Use Topics    Alcohol use: No    Drug use: No     Review of Systems   Constitutional:  Positive for chills, fatigue and fever.   Respiratory:  Positive for cough.    Gastrointestinal:  Positive for abdominal pain, diarrhea and nausea.   Genitourinary:  Negative for dysuria, hematuria and testicular pain.   Musculoskeletal:  Positive for myalgias. Negative for back pain.   Skin:  Negative for rash.   Neurological:  Positive for headaches.     Physical Exam     Initial Vitals [22]   BP Pulse Resp Temp SpO2   (!) 143/117 (!) 112 (!) 24 (!) 102.4 °F (39.1 °C) 95 %      MAP       --         Physical Exam    Nursing note and vitals reviewed.  Constitutional: He appears well-developed and well-nourished.   Mildly ill appearing elderly WM. Nontoxic.   HENT:   Head: Normocephalic and atraumatic.    Mouth/Throat: Oropharynx is clear and moist.   Eyes: Conjunctivae and EOM are normal. No scleral icterus.   Neck: Neck supple. No JVD present.   Normal range of motion.  Cardiovascular:  Regular rhythm, normal heart sounds and intact distal pulses.           tachy   Pulmonary/Chest: He has no rales. He exhibits no tenderness.   Diminished anteriorly, no acute distress   Abdominal: Abdomen is soft. Bowel sounds are normal. He exhibits distension. He exhibits no mass. There is abdominal tenderness. There is guarding. There is no rebound.   Musculoskeletal:         General: No tenderness or edema. Normal range of motion.      Cervical back: Normal range of motion and neck supple.     Neurological: He is alert and oriented to person, place, and time.   Skin: Skin is warm and dry. Capillary refill takes less than 2 seconds. No rash noted. No erythema. There is pallor.       ED Course   Procedures  Labs Reviewed   CBC W/ AUTO DIFFERENTIAL - Abnormal; Notable for the following components:       Result Value    RBC 4.15 (*)     Hemoglobin 12.5 (*)     Hematocrit 36.8 (*)     Lymph # 0.7 (*)     Gran % 83.4 (*)     Lymph % 8.2 (*)     All other components within normal limits   COMPREHENSIVE METABOLIC PANEL - Abnormal; Notable for the following components:    Sodium 133 (*)     CO2 22 (*)     Glucose 176 (*)     Calcium 8.6 (*)     Albumin 3.2 (*)     All other components within normal limits   MAGNESIUM - Abnormal; Notable for the following components:    Magnesium 1.4 (*)     All other components within normal limits   INFLUENZA A & B BY MOLECULAR   CULTURE, BLOOD   CULTURE, BLOOD   LIPASE   SARS-COV-2 RNA AMPLIFICATION, QUAL    Narrative:     Is the patient symptomatic?->Yes   LACTIC ACID, PLASMA   URINALYSIS, REFLEX TO URINE CULTURE     EKG Readings: (Independently Interpreted)   Rhythm: Sinus Tachycardia. Heart Rate: 112. Ectopy: PACs. Conduction: Normal. ST Segments: Normal ST Segments. T Waves: Normal. Axis: Normal.  Clinical Impression: Sinus Tachycardia with PACs     Imaging Results              CT Chest Abdomen Pelvis With Contrast (xpd) (In process)                      X-Ray Chest AP Portable (In process)                      Medications   levoFLOXacin 750 mg/150 mL IVPB 750 mg (750 mg Intravenous New Bag 9/7/22 2126)   magnesium sulfate 2g in water 50mL IVPB (premix) (2 g Intravenous New Bag 9/7/22 2136)   sodium chloride 0.9% bolus 1,000 mL (1,000 mLs Intravenous New Bag 9/7/22 2039)   acetaminophen tablet 1,000 mg (1,000 mg Oral Given 9/7/22 2037)   albuterol-ipratropium 2.5 mg-0.5 mg/3 mL nebulizer solution 3 mL (3 mLs Nebulization Given 9/7/22 2140)   morphine injection 4 mg (4 mg Intravenous Given 9/7/22 2132)   ondansetron injection 4 mg (4 mg Intravenous Given 9/7/22 2132)   iohexoL (OMNIPAQUE 350) injection 100 mL (100 mLs Intravenous Given 9/7/22 2215)     Medical Decision Making:   Differential Diagnosis:   Covid, viral syndrome, sepsis, pneumonia, CHF  Clinical Tests:   Lab Tests: Ordered and Reviewed  Radiological Study: Ordered and Reviewed  Medical Tests: Ordered and Reviewed  ED Management:  Pt presented with cough, sob, bodyaches and diarrhea. Suspected covid but swabbed neg. CXR c/w right side pneumonia. RA sat 91%. Pt is former smoker but no hx of COPD. CBC, CMP, lipase unremarkable. Mg 1.4, 2g given. Lactate normal. UA neg for infection. Flu also neg. Pt given IVFs and levaquin 750mg IV. Port score 80 but will benefit from hospital admission for nebs, IVFs and abx. Will attempt to collect stool for Cx.                     Clinical Impression:   Final diagnoses:  [R06.02] SOB (shortness of breath)  [J18.9] Pneumonia of right middle lobe due to infectious organism (Primary)  [E83.42] Hypomagnesemia  [R19.7] Diarrhea, unspecified type      ED Disposition Condition    Observation                 Rusty Downey Jr., MD  09/07/22 0215

## 2022-09-08 NOTE — HPI
The patient is an 83 y/o male with PMH of HTN who presents with SOB and weakness. He reports fevers, chills, headahces, and diarrhea. He was negative for covid but work up shows right sided pneumonia. He denies any CP, bloody stools, or other symptoms.

## 2022-09-08 NOTE — SUBJECTIVE & OBJECTIVE
Interval History: no acute events overnight    Review of Systems   Constitutional:  Positive for fatigue. Negative for fever.   HENT:  Positive for congestion.    Eyes:  Negative for visual disturbance.   Respiratory:  Positive for cough and shortness of breath.    Cardiovascular:  Negative for chest pain.   Gastrointestinal:  Negative for abdominal pain.   Endocrine: Negative.    Genitourinary: Negative.    Musculoskeletal: Negative.    Skin: Negative.    Allergic/Immunologic: Negative.    Neurological: Negative.    Hematological: Negative.    Psychiatric/Behavioral: Negative.     Objective:     Vital Signs (Most Recent):  Temp: 97.8 °F (36.6 °C) (09/08/22 1246)  Pulse: 85 (09/08/22 1246)  Resp: 19 (09/08/22 1246)  BP: (!) 163/88 (09/08/22 1246)  SpO2: (!) 92 % (09/08/22 1246)   Vital Signs (24h Range):  Temp:  [97.8 °F (36.6 °C)-102.4 °F (39.1 °C)] 97.8 °F (36.6 °C)  Pulse:  [] 85  Resp:  [18-30] 19  SpO2:  [91 %-99 %] 92 %  BP: (101-163)/() 163/88     Weight: 93.5 kg (206 lb 2.1 oz)  Body mass index is 32.28 kg/m².    Intake/Output Summary (Last 24 hours) at 9/8/2022 1344  Last data filed at 9/8/2022 0800  Gross per 24 hour   Intake 720 ml   Output --   Net 720 ml      Physical Exam  Vitals reviewed.   Constitutional:       General: He is not in acute distress.     Appearance: He is ill-appearing. He is not toxic-appearing or diaphoretic.   HENT:      Head: Normocephalic and atraumatic.      Right Ear: External ear normal.      Left Ear: External ear normal.      Nose: Nose normal.      Mouth/Throat:      Mouth: Mucous membranes are moist.   Eyes:      Conjunctiva/sclera: Conjunctivae normal.   Cardiovascular:      Rate and Rhythm: Normal rate and regular rhythm.      Pulses: Normal pulses.      Heart sounds: No murmur heard.    No gallop.   Pulmonary:      Effort: Pulmonary effort is normal. No respiratory distress.      Breath sounds: Rales present. No wheezing.   Abdominal:      Palpations: Abdomen  is soft.   Musculoskeletal:      Right lower leg: No edema.      Left lower leg: No edema.   Skin:     General: Skin is warm and dry.   Neurological:      Mental Status: He is alert. Mental status is at baseline.   Psychiatric:         Mood and Affect: Mood normal.       Significant Labs: All pertinent labs within the past 24 hours have been reviewed.  CBC:   Recent Labs   Lab 09/07/22 2042 09/08/22  0521   WBC 8.92 7.52   HGB 12.5* 11.3*   HCT 36.8* 32.8*    156     CMP:   Recent Labs   Lab 09/07/22 2042 09/08/22  0521   * 133*   K 3.9 3.7   CL 96 98   CO2 22* 22*   * 188*   BUN 17 16   CREATININE 1.2 1.1   CALCIUM 8.6* 8.1*   PROT 7.4  --    ALBUMIN 3.2*  --    BILITOT 0.8  --    ALKPHOS 76  --    AST 20  --    ALT 18  --    ANIONGAP 15 13       Significant Imaging: I have reviewed all pertinent imaging results/findings within the past 24 hours.  X-Ray Pelvis 3 view inc Hip 2 view Right   Final Result      1. 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   2. Remote ORIF and posttraumatic deformity of the left pelvis.         Electronically signed by: Simone Belle   Date:    09/08/2022   Time:    10:02      CT Chest Abdomen Pelvis With Contrast (xpd)   Final Result      Pneumonia in the right lung.  Right hilar and mediastinal adenopathy, likely reactive, follow-up recommended.      No acute abnormality in the abdomen or pelvis.  Gallstones, left adrenal adenoma, fatty infiltration of the liver, colonic diverticula.      Small nodules in the left lung measuring up to 5 mm in size not clearly seen on the 2014 study.  For multiple solid nodules all <6 mm, Fleischner Society 2017 guidelines recommend no routine follow up for a low risk patient, or follow up with non-contrast chest CT at 12 months after discovery in a high risk patient.         Electronically signed by: Tessa Leon   Date:    09/08/2022   Time:    08:30      X-Ray Chest AP Portable   Final Result      Findings  concerning for pneumonia in the right lung.         Electronically signed by: Tessa Leon   Date:    09/08/2022   Time:    08:16

## 2022-09-08 NOTE — ASSESSMENT & PLAN NOTE
Continue levaquin  Oxygen PRN   duonebs - may need to be changed to scheduled  Febrile since admission

## 2022-09-08 NOTE — NURSING
"0130>Pt reports he falls, "At least 4-5 times a week."  Agitated when staff explained bed alarm, staff assist for getting up, and bedside commode.  Pt reports he wants to walk on his own and doesn't need help.  Uses profanity and interrupts nurse while she explains.  Bed alarm refused.  Telesitter placed.  "

## 2022-09-08 NOTE — PLAN OF CARE
Problem: Adult Inpatient Plan of Care  Goal: Plan of Care Review  Outcome: Ongoing, Not Progressing     Problem: Adult Inpatient Plan of Care  Goal: Patient-Specific Goal (Individualized)  Outcome: Ongoing, Not Progressing     Problem: Adult Inpatient Plan of Care  Goal: Absence of Hospital-Acquired Illness or Injury  Outcome: Ongoing, Not Progressing     Problem: Adult Inpatient Plan of Care  Goal: Optimal Comfort and Wellbeing  Outcome: Ongoing, Not Progressing     Problem: Fluid Imbalance (Pneumonia)  Goal: Fluid Balance  Outcome: Ongoing, Not Progressing     Problem: Adult Inpatient Plan of Care  Goal: Optimal Comfort and Wellbeing  Outcome: Ongoing, Not Progressing     Problem: Respiratory Compromise (Pneumonia)  Goal: Effective Oxygenation and Ventilation  Outcome: Ongoing, Not Progressing     Problem: Mobility Impairment  Goal: Optimal Mobility  Outcome: Ongoing, Not Progressing     Problem: Pain Acute  Goal: Acceptable Pain Control and Functional Ability  Outcome: Ongoing, Not Progressing

## 2022-09-08 NOTE — PLAN OF CARE
09/08/22 1345   Discharge Assessment   Assessment Type Discharge Planning Assessment   Confirmed/corrected address, phone number and insurance Yes   Confirmed Demographics Contacted registration to update   Source of Information patient   When was your last doctors appointment?   (a few months ago)   Does patient/caregiver understand observation status Yes   Communicated SUAD with patient/caregiver Yes   Reason For Admission pneumonia   Lives With child(micky), adult;grandchild(micky);other relative(s)   Do you expect to return to your current living situation? Yes   Do you have help at home or someone to help you manage your care at home? Yes   Who are your caregiver(s) and their phone number(s)? Luma Rogel daughter [phone]   Prior to hospitilization cognitive status: Alert/Oriented   Current cognitive status: Alert/Oriented   Walking or Climbing Stairs Difficulty none   Dressing/Bathing Difficulty none   Do you have any problems with: Errands/Grocery  (daughter does grocery shopping)   Home Accessibility other (see comments);wheelchair accessible  (has a ramp)   Home Layout Able to live on 1st floor   Equipment Currently Used at Home cane, straight;shower chair;walker, rolling;wheelchair   Readmission within 30 days? No   Patient currently being followed by outpatient case management? No   Do you currently have service(s) that help you manage your care at home? No   Do you take prescription medications? Yes   Do you have prescription coverage? Yes   [facility]age [facility]   Do you have any problems affording any of your prescribed medications? No   Is the patient taking medications as prescribed? yes   Who is going to help you get home at discharge? Luma Rogel daughter [phone]   How do you get to doctors appointments? car, drives self   Are you on dialysis? No   Do you take coumadin? No   Discharge Plan A Home with family   Discharge Plan B Home with family   DME Needed Upon Discharge  none   Discharge Plan  discussed with: Patient   Discharge Barriers Identified None   Physical Activity   On average, how many days per week do you engage in moderate to strenuous exercise (like a brisk walk)? 7 days   On average, how many minutes do you engage in exercise at this level? 150+ min   Financial Resource Strain   How hard is it for you to pay for the very basics like food, housing, medical care, and heating? Not hard   Housing Stability   In the last 12 months, was there a time when you were not able to pay the mortgage or rent on time? N   In the last 12 months, how many places have you lived? 2   In the last 12 months, was there a time when you did not have a steady place to sleep or slept in a shelter (including now)? N   Transportation Needs   In the past 12 months, has lack of transportation kept you from medical appointments or from getting medications? no   In the past 12 months, has lack of transportation kept you from meetings, work, or from getting things needed for daily living? No   Food Insecurity   Within the past 12 months, you worried that your food would run out before you got the money to buy more. Never true   Within the past 12 months, the food you bought just didn't last and you didn't have money to get more. Never true   Stress   Do you feel stress - tense, restless, nervous, or anxious, or unable to sleep at night because your mind is troubled all the time - these days? Only a littl   Social Connections   In a typical week, how many times do you talk on the phone with family, friends, or neighbors? More than 3   How often do you get together with friends or relatives? More than 3   How often do you attend Orthodoxy or Mu-ism services? Never   Do you belong to any clubs or organizations such as Orthodoxy groups, unions, fraternal or athletic groups, or school groups? No   Are you , , , , never , or living with a partner?    Alcohol Use   Q1: How often do you have  a drink containing alcohol? Monthly or l   Q2: How many drinks containing alcohol do you have on a typical day when you are drinking? 1 or 2   Relationship/Environment   Name(s) of Who Lives With Patient Luma Rogel daughter [phone]   Patient alert & oriented hard of hearing lives at home with his daughter Luma, her spouse & their son. He has a walker, cane & wheelchair at home but doesn't use any of them. [facility]. His P[facility] is Dr [facility]. He normally drives himself to his appointments. [facility]. Denies any needs at this time.

## 2022-09-08 NOTE — PT/OT/SLP EVAL
OT order received and chart reviewed.  Based on functional assessment and UE assessment performed by OT, patient does not appear to have any functional deficits that warrant OT services.        Nirmal Torres, OTR/L

## 2022-09-08 NOTE — PLAN OF CARE
Patient in no apparent distress. Sat's  94 % on room air. Prn treatment given . Will continue to monitor.

## 2022-09-09 VITALS
HEART RATE: 77 BPM | RESPIRATION RATE: 17 BRPM | OXYGEN SATURATION: 92 % | BODY MASS INDEX: 32.35 KG/M2 | WEIGHT: 206.13 LBS | SYSTOLIC BLOOD PRESSURE: 153 MMHG | DIASTOLIC BLOOD PRESSURE: 91 MMHG | HEIGHT: 67 IN | TEMPERATURE: 98 F

## 2022-09-09 LAB
MRSA ID BY PCR: NEGATIVE
STAPH AUREUS ID BY PCR: NEGATIVE

## 2022-09-09 PROCEDURE — 87150 DNA/RNA AMPLIFIED PROBE: CPT | Performed by: EMERGENCY MEDICINE

## 2022-09-09 PROCEDURE — 99217 PR OBSERVATION CARE DISCHARGE: CPT | Mod: ,,, | Performed by: FAMILY MEDICINE

## 2022-09-09 PROCEDURE — G0378 HOSPITAL OBSERVATION PER HR: HCPCS

## 2022-09-09 PROCEDURE — 97116 GAIT TRAINING THERAPY: CPT

## 2022-09-09 PROCEDURE — 99217 PR OBSERVATION CARE DISCHARGE: ICD-10-PCS | Mod: ,,, | Performed by: FAMILY MEDICINE

## 2022-09-09 PROCEDURE — 25000003 PHARM REV CODE 250: Performed by: FAMILY MEDICINE

## 2022-09-09 RX ORDER — LEVOFLOXACIN 750 MG/1
750 TABLET ORAL DAILY
Qty: 4 TABLET | Refills: 0 | Status: SHIPPED | OUTPATIENT
Start: 2022-09-09 | End: 2022-09-13

## 2022-09-09 RX ADMIN — ACETAMINOPHEN 650 MG: 325 TABLET ORAL at 07:09

## 2022-09-09 NOTE — PT/OT/SLP PROGRESS
Physical Therapy Treatment    Patient Name:  Larry Lepley   MRN:  [id number]    Recommendations:     Discharge Recommendations:  home, outpatient PT   Discharge Equipment Recommendations:  (assessment ongoing)   Barriers to discharge: None    Assessment:     Larry Lepley is a 82 y.o. male admitted with a medical diagnosis of Community acquired pneumonia.  He presents with the following impairments/functional limitations:  weakness, impaired endurance, gait instability, impaired balance, pain, decreased ROM.  Patient demonstrates unsteady gait with increase fall risk. He will benefit from continued PT following discharge from hospital to address deficits.     Rehab Prognosis: Good; patient would benefit from acute skilled PT services to address these deficits and reach maximum level of function.    Recent Surgery: * No surgery found *      Plan:     During this hospitalization, patient to be seen  (3-5 x/wk) to address the identified rehab impairments via gait training, therapeutic activities, therapeutic exercises and progress toward the following goals:    Plan of Care Expires:   (upon discharge from hospital)    Subjective     Chief Complaint: community acquired pneumonia  Patient/Family Comments/goals: patient stating he walks better without an assistive device  Pain/Comfort:  Pain Rating 1: 0/10      Objective:     Communicated with RN prior to session.  Patient found sitting edge of bed with peripheral IV, telemetry upon PT entry to room.     General Precautions: Standard, fall   Orthopedic Precautions:N/A   Braces:  N/A  Respiratory Status: Room air     Functional Mobility:  Bed Mobility:  Supine to Sit: stand by assistance  Sit to Supine: stand by assistance  Transfers:  Sit to Stand:  contact guard assistance with rolling walker, verbal cueing for hand placement and technique  Gait: patient ambulated 80' with CGA to min assist using RW and wearing orthopedic shoes    Treatment and Education:  Patient performed  bed mobility with SBA, sit to stand with CGA using RW. Patient ambulated 80' with CGA to min assist for safety and balance requiring PT assist twice secondary to LOB. Patient demonstrates gait antalgic slap foot gait pattern. Decreased lateral sway and deviation in path noted when wearing orthopedic shoes, however, balance continues to be impaired increasing risk of falling. Patient insistent that he ambulates better w/o an AD. PT education in consistent use of AD for safe gait and decrease fall risk.   Patient educated in call light use, importance of OOB activity and functional mobility to negate the negative effects of prolonged bed rest during this hospitalization, safe transfers/ambulation and discharge planning recommendations/options    Patient left sitting edge of bed with all lines intact, call button in reach, RN notified, and avasys in use .    GOALS:   Patient to be mod I with all aspects of bed mobility  Patient to require SBA for transfers using LRAD  Patient to ambulate >150' with SBA using LRAD    Time Tracking:     PT Received On: 09/09/22  PT Start Time: 1240     PT Stop Time: 1300  PT Total Time (min): 20 min     Billable Minutes: Gait Training 20 min    Treatment Type: Treatment  PT/PTA: PT           09/09/2022

## 2022-09-09 NOTE — NURSING
D/C instructions provided and explained to pt, understanding of D/C instructions verbalized. IV removed, catheter intact. Tolerated well. Telemetry monitor removed and returned to monitor room. VSS. Pt denies complaints. Waiting for transportation.

## 2022-09-09 NOTE — PLAN OF CARE
09/09/22 1345   Final Note   Assessment Type Final Discharge Note   Anticipated Discharge Disposition Home   What phone number can be called within the next 1-3 days to see how you are doing after discharge? [phone]   Post-Acute Status   Discharge Delays None known at this time   [facility] that [facility] with Dr Barraza. States he [facility]. States if he doesn't hear from Dr Barraza by that appointment he will check with them. Provided him with voucher to use at [facility]. [facility] has used them in the past. Denies any other needs at this time.

## 2022-09-11 LAB
BACTERIA BLD CULT: ABNORMAL

## 2022-09-13 LAB — BACTERIA BLD CULT: NORMAL

## 2022-09-19 NOTE — DISCHARGE SUMMARY
[facility] Hospital Medicine  Discharge Summary      Patient Name: Larry Lepley  MRN: [id number]  Patient Class: OP- Observation  Admission Date: 9/7/2022  Hospital Length of Stay: 0 days  Discharge Date and Time: 9/9/2022  3:47 PM  Attending Physician: Sabrina Huerta MD  Discharging Provider: Sabrina Huerta MD  Primary Care Provider: [facility]      HPI:   The patient is an 83 y/o male with PMH of HTN who presents with SOB and weakness. He reports fevers, chills, headahces, and diarrhea. He was negative for covid but work up shows right sided pneumonia. He denies any CP, bloody stools, or other symptoms.       * No surgery found *        Goals of Care Treatment Preferences:  Code Status: Full Code      Consults:     Hospital Course:   Patient admitted and treated with levaquin, duonebs, supplemental oxygen as needed. Had high fevers requiring continued hospitalization to ensure defervescence. Once symptoms improved and afebrile, he was discharged home in good condition with follow up scheduled with PCP.    * Community acquired pneumonia  Continue levaquin  Oxygen PRN   duonebs - may need to be changed to scheduled  Febrile since admission         Hyperlipidemia  Continue atorvastatin             Final Active Diagnoses:    Diagnosis Date Noted POA    PRINCIPAL PROBLEM:  Community acquired pneumonia [J18.9] 04/29/2020 Yes    Hyperlipidemia [E78.5] 09/08/2022 Yes      Problems Resolved During this Admission:       Discharged Condition: good    Disposition: Home or Self Care    Follow Up:   Follow-up Information       Raisa Barraza MD .    Specialty: Internal Medicine  Why: someone will call with follow up appointment  Contact information:  [address] [facility] [place] [address]  [phone]                           Patient Instructions:      Diet Adult Regular     Notify your health care provider if you experience any of the following:  temperature  >100.4     Notify your health care provider if you experience any of the following:  difficulty breathing or increased cough     Activity as tolerated       Significant Diagnostic Studies:   Results for orders placed or performed during the hospital encounter of 09/07/22   Influenza A & B by Molecular    Specimen: Nasopharyngeal Swab   Result Value Ref Range    Influenza A, Molecular Negative Negative    Influenza B, Molecular Negative Negative    Flu A & B Source Nasal Swab    Blood culture #1 **CANNOT BE ORDERED STAT**    Specimen: Peripheral, Hand, Right; Blood   Result Value Ref Range    Blood Culture, Routine       Gram stain aer bottle: Gram positive cocci in clusters resembling Staph    Blood Culture, Routine       Results called to and read back by:Yen Monk RN 09/09/2022  18:18    Blood Culture, Routine (A)      COAGULASE-NEGATIVE STAPHYLOCOCCUS SPECIES  Organism is a probable contaminant     Blood culture #2 **CANNOT BE ORDERED STAT**    Specimen: Peripheral, Hand, Left; Blood   Result Value Ref Range    Blood Culture, Routine No growth after 5 days.    MRSA/SA Rapid ID by PCR from Blood culture   Result Value Ref Range    Staph aureus ID by PCR Negative Negative    MRSA ID by PCR Negative Negative   Complete Blood Count (CBC)   Result Value Ref Range    WBC 8.92 3.90 - 12.70 K/uL    RBC 4.15 (L) 4.60 - 6.20 M/uL    Hemoglobin 12.5 (L) 14.0 - 18.0 g/dL    Hematocrit 36.8 (L) 40.0 - 54.0 %    MCV 89 82 - 98 fL    MCH 30.1 27.0 - 31.0 pg    MCHC 34.0 32.0 - 36.0 g/dL    RDW 12.4 11.5 - 14.5 %    Platelets 183 150 - 450 K/uL    MPV 10.1 9.2 - 12.9 fL    Immature Granulocytes 0.4 0.0 - 0.5 %    Gran # (ANC) 7.4 1.8 - 7.7 K/uL    Immature Grans (Abs) 0.04 0.00 - 0.04 K/uL    Lymph # 0.7 (L) 1.0 - 4.8 K/uL    Mono # 0.7 0.3 - 1.0 K/uL    Eos # 0.0 0.0 - 0.5 K/uL    Baso # 0.02 0.00 - 0.20 K/uL    nRBC 0 0 /100 WBC    Gran % 83.4 (H) 38.0 - 73.0 %    Lymph % 8.2 (L) 18.0 - 48.0 %    Mono % 7.6 4.0 - 15.0 %     Eosinophil % 0.2 0.0 - 8.0 %    Basophil % 0.2 0.0 - 1.9 %    Differential Method Automated    Comprehensive Metabolic Panel (CMP)   Result Value Ref Range    Sodium 133 (L) 136 - 145 mmol/L    Potassium 3.9 3.5 - 5.1 mmol/L    Chloride 96 95 - 110 mmol/L    CO2 22 (L) 23 - 29 mmol/L    Glucose 176 (H) 70 - 110 mg/dL    BUN 17 8 - 23 mg/dL    Creatinine 1.2 0.5 - 1.4 mg/dL    Calcium 8.6 (L) 8.7 - 10.5 mg/dL    Total Protein 7.4 6.0 - 8.4 g/dL    Albumin 3.2 (L) 3.5 - 5.2 g/dL    Total Bilirubin 0.8 0.1 - 1.0 mg/dL    Alkaline Phosphatase 76 55 - 135 U/L    AST 20 10 - 40 U/L    ALT 18 10 - 44 U/L    Anion Gap 15 8 - 16 mmol/L    eGFR >60.0 >60 mL/min/1.73 m^2   Magnesium   Result Value Ref Range    Magnesium 1.4 (L) 1.6 - 2.6 mg/dL   Lipase   Result Value Ref Range    Lipase 32 4 - 60 U/L   Urinalysis, Reflex to Urine Culture Urine, Clean Catch    Specimen: Urine   Result Value Ref Range    Specimen UA Urine, Unspecified     Color, UA Yellow Yellow, Straw, Tran    Appearance, UA Clear Clear    pH, UA 6.0 5.0 - 8.0    Specific Gravity, UA 1.010 1.005 - 1.030    Protein, UA 2+ (A) Negative    Glucose, UA Negative Negative    Ketones, UA Negative Negative    Bilirubin (UA) Negative Negative    Occult Blood UA Negative Negative    Nitrite, UA Negative Negative    Urobilinogen, UA 1.0 Negative EU/dL    Leukocytes, UA Negative Negative   COVID-19 Rapid Screening   Result Value Ref Range    SARS-CoV-2 RNA, Amplification, Qual Negative Negative   Lactic acid, plasma   Result Value Ref Range    Lactate (Lactic Acid) 1.1 0.5 - 2.2 mmol/L   Urinalysis Microscopic   Result Value Ref Range    RBC, UA 0 0 - 4 /hpf    WBC, UA 1 0 - 5 /hpf    Bacteria Rare None-Occ /hpf    Squam Epithel, UA 1 /hpf    Hyaline Casts, UA 2 (A) 0-1/lpf /lpf    Microscopic Comment SEE COMMENT    Basic Metabolic Panel (BMP)   Result Value Ref Range    Sodium 133 (L) 136 - 145 mmol/L    Potassium 3.7 3.5 - 5.1 mmol/L    Chloride 98 95 - 110 mmol/L     CO2 22 (L) 23 - 29 mmol/L    Glucose 188 (H) 70 - 110 mg/dL    BUN 16 8 - 23 mg/dL    Creatinine 1.1 0.5 - 1.4 mg/dL    Calcium 8.1 (L) 8.7 - 10.5 mg/dL    Anion Gap 13 8 - 16 mmol/L    eGFR >60.0 >60 mL/min/1.73 m^2   Magnesium   Result Value Ref Range    Magnesium 1.8 1.6 - 2.6 mg/dL   Phosphorus   Result Value Ref Range    Phosphorus 2.7 2.7 - 4.5 mg/dL   CBC auto differential   Result Value Ref Range    WBC 7.52 3.90 - 12.70 K/uL    RBC 3.71 (L) 4.60 - 6.20 M/uL    Hemoglobin 11.3 (L) 14.0 - 18.0 g/dL    Hematocrit 32.8 (L) 40.0 - 54.0 %    MCV 88 82 - 98 fL    MCH 30.5 27.0 - 31.0 pg    MCHC 34.5 32.0 - 36.0 g/dL    RDW 12.5 11.5 - 14.5 %    Platelets 156 150 - 450 K/uL    MPV 10.0 9.2 - 12.9 fL    Immature Granulocytes 0.7 (H) 0.0 - 0.5 %    Gran # (ANC) 6.0 1.8 - 7.7 K/uL    Immature Grans (Abs) 0.05 (H) 0.00 - 0.04 K/uL    Lymph # 0.6 (L) 1.0 - 4.8 K/uL    Mono # 0.9 0.3 - 1.0 K/uL    Eos # 0.0 0.0 - 0.5 K/uL    Baso # 0.01 0.00 - 0.20 K/uL    nRBC 0 0 /100 WBC    Gran % 79.2 (H) 38.0 - 73.0 %    Lymph % 8.0 (L) 18.0 - 48.0 %    Mono % 11.7 4.0 - 15.0 %    Eosinophil % 0.3 0.0 - 8.0 %    Basophil % 0.1 0.0 - 1.9 %    Differential Method Automated      X-Ray Pelvis 3 view inc Hip 2 view Right   Final Result      1. 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   2. Remote ORIF and posttraumatic deformity of the left pelvis.         Electronically signed by: Simone Belle   Date:    09/08/2022   Time:    10:02      CT Chest Abdomen Pelvis With Contrast (xpd)   Final Result      Pneumonia in the right lung.  Right hilar and mediastinal adenopathy, likely reactive, follow-up recommended.      No acute abnormality in the abdomen or pelvis.  Gallstones, left adrenal adenoma, fatty infiltration of the liver, colonic diverticula.      Small nodules in the left lung measuring up to 5 mm in size not clearly seen on the 2014 study.  For multiple solid nodules all <6 mm, Fleischner Society 2017 guidelines  recommend no routine follow up for a low risk patient, or follow up with non-contrast chest CT at 12 months after discovery in a high risk patient.         Electronically signed by: Tessa Leon   Date:    09/08/2022   Time:    08:30      X-Ray Chest AP Portable   Final Result      Findings concerning for pneumonia in the right lung.         Electronically signed by: Tessa Leon   Date:    09/08/2022   Time:    08:16            Pending Diagnostic Studies:       None           Medications:  Reconciled Home Medications:      Medication List        CONTINUE taking these medications      acetaminophen 500 MG tablet  Commonly known as: TYLENOL  Take 2 tablets (1,000 mg total) by mouth every 6 (six) hours as needed for Pain or Temperature greater than (or equal to 101 degree F).     atorvastatin 40 MG tablet  Commonly known as: LIPITOR  Take 1 tablet by mouth nightly.     ipratropium-albuteroL  mcg/actuation inhaler  Commonly known as: CombiVENT  Inhale 2 puffs into the lungs every 6 (six) hours as needed for Wheezing. Rescue     LISINOPRIL ORAL  Take 10 mg by mouth once daily.     omeprazole 20 MG capsule  Commonly known as: PRILOSEC  Take 1 capsule by mouth once daily.     pantoprazole 40 MG tablet  Commonly known as: PROTONIX  Take 1 tablet (40 mg total) by mouth once daily.            STOP taking these medications      doxycycline 100 MG tablet  Commonly known as: VIBRA-TABS     guaiFENesin 600 mg 12 hr tablet  Commonly known as: MUCINEX     HYDROcodone-acetaminophen 5-325 mg per tablet  Commonly known as: NORCO            ASK your doctor about these medications      levoFLOXacin 750 MG tablet  Commonly known as: LEVAQUIN  Take 1 tablet (750 mg total) by mouth once daily. for 4 days  Ask about: Should I take this medication?              Indwelling Lines/Drains at time of discharge:   Lines/Drains/Airways       None                   Time spent on the discharge of patient: 45 minutes          Sabrina Huerta MD  Department of Hospital Medicine  [facility]

## 2022-12-12 PROBLEM — J18.9 COMMUNITY ACQUIRED PNEUMONIA: Status: RESOLVED | Noted: 2020-04-29 | Resolved: 2022-12-12

## 2023-01-03 ENCOUNTER — HOSPITAL ENCOUNTER (EMERGENCY)
Facility: HOSPITAL | Age: 83
Discharge: HOME | End: 2023-01-03
Attending: FAMILY MEDICINE
Payer: OTHER GOVERNMENT

## 2023-01-03 VITALS
DIASTOLIC BLOOD PRESSURE: 98 MMHG | HEIGHT: 67 IN | HEART RATE: 66 BPM | TEMPERATURE: 98 F | BODY MASS INDEX: 31.23 KG/M2 | OXYGEN SATURATION: 95 % | WEIGHT: 199 LBS | SYSTOLIC BLOOD PRESSURE: 157 MMHG | RESPIRATION RATE: 20 BRPM

## 2023-01-03 DIAGNOSIS — R41.82 AMS (ALTERED MENTAL STATUS): ICD-10-CM

## 2023-01-03 DIAGNOSIS — G45.9 TIA (TRANSIENT ISCHEMIC ATTACK): Primary | ICD-10-CM

## 2023-01-03 LAB
ALBUMIN SERPL BCP-MCNC: 3.4 G/DL (ref 3.5–5.2)
ALP SERPL-CCNC: 116 U/L (ref 55–135)
ALT SERPL W/O P-5'-P-CCNC: 16 U/L (ref 10–44)
ANION GAP SERPL CALC-SCNC: 9 MMOL/L (ref 8–16)
AST SERPL-CCNC: 12 U/L (ref 10–40)
BASOPHILS # BLD AUTO: 0.05 K/UL (ref 0–0.2)
BASOPHILS NFR BLD: 0.7 % (ref 0–1.9)
BILIRUB SERPL-MCNC: 0.3 MG/DL (ref 0.1–1)
BUN SERPL-MCNC: 18 MG/DL (ref 8–23)
CALCIUM SERPL-MCNC: 8.7 MG/DL (ref 8.7–10.5)
CHLORIDE SERPL-SCNC: 105 MMOL/L (ref 95–110)
CO2 SERPL-SCNC: 26 MMOL/L (ref 23–29)
CREAT SERPL-MCNC: 1.2 MG/DL (ref 0.5–1.4)
DIFFERENTIAL METHOD: ABNORMAL
EOSINOPHIL # BLD AUTO: 0.4 K/UL (ref 0–0.5)
EOSINOPHIL NFR BLD: 5 % (ref 0–8)
ERYTHROCYTE [DISTWIDTH] IN BLOOD BY AUTOMATED COUNT: 12.5 % (ref 11.5–14.5)
EST. GFR  (NO RACE VARIABLE): >60 ML/MIN/1.73 M^2
GLUCOSE SERPL-MCNC: 320 MG/DL (ref 70–110)
HCT VFR BLD AUTO: 38.3 % (ref 40–54)
HCV AB SERPL QL IA: NORMAL
HGB BLD-MCNC: 13.1 G/DL (ref 14–18)
HIV 1+2 AB+HIV1 P24 AG SERPL QL IA: NORMAL
IMM GRANULOCYTES # BLD AUTO: 0.03 K/UL (ref 0–0.04)
IMM GRANULOCYTES NFR BLD AUTO: 0.4 % (ref 0–0.5)
LYMPHOCYTES # BLD AUTO: 1.6 K/UL (ref 1–4.8)
LYMPHOCYTES NFR BLD: 20.8 % (ref 18–48)
MCH RBC QN AUTO: 31 PG (ref 27–31)
MCHC RBC AUTO-ENTMCNC: 34.2 G/DL (ref 32–36)
MCV RBC AUTO: 91 FL (ref 82–98)
MONOCYTES # BLD AUTO: 0.6 K/UL (ref 0.3–1)
MONOCYTES NFR BLD: 8.2 % (ref 4–15)
NEUTROPHILS # BLD AUTO: 4.9 K/UL (ref 1.8–7.7)
NEUTROPHILS NFR BLD: 64.9 % (ref 38–73)
NRBC BLD-RTO: 0 /100 WBC
PLATELET # BLD AUTO: 187 K/UL (ref 150–450)
PMV BLD AUTO: 11.1 FL (ref 9.2–12.9)
POCT GLUCOSE: 269 MG/DL (ref 70–110)
POCT GLUCOSE: 326 MG/DL (ref 70–110)
POTASSIUM SERPL-SCNC: 3.9 MMOL/L (ref 3.5–5.1)
PROT SERPL-MCNC: 6.4 G/DL (ref 6–8.4)
RBC # BLD AUTO: 4.22 M/UL (ref 4.6–6.2)
SODIUM SERPL-SCNC: 140 MMOL/L (ref 136–145)
WBC # BLD AUTO: 7.55 K/UL (ref 3.9–12.7)

## 2023-01-03 PROCEDURE — 70450 CT HEAD/BRAIN W/O DYE: CPT | Mod: 26,,, | Performed by: RADIOLOGY

## 2023-01-03 PROCEDURE — 25000003 PHARM REV CODE 250: Performed by: FAMILY MEDICINE

## 2023-01-03 PROCEDURE — 86803 HEPATITIS C AB TEST: CPT | Performed by: FAMILY MEDICINE

## 2023-01-03 PROCEDURE — 93010 EKG 12-LEAD: ICD-10-PCS | Mod: ,,, | Performed by: INTERNAL MEDICINE

## 2023-01-03 PROCEDURE — 80053 COMPREHEN METABOLIC PANEL: CPT | Performed by: FAMILY MEDICINE

## 2023-01-03 PROCEDURE — 96361 HYDRATE IV INFUSION ADD-ON: CPT

## 2023-01-03 PROCEDURE — 36415 COLL VENOUS BLD VENIPUNCTURE: CPT | Performed by: FAMILY MEDICINE

## 2023-01-03 PROCEDURE — 93010 ELECTROCARDIOGRAM REPORT: CPT | Mod: ,,, | Performed by: INTERNAL MEDICINE

## 2023-01-03 PROCEDURE — 93005 ELECTROCARDIOGRAM TRACING: CPT

## 2023-01-03 PROCEDURE — 99285 EMERGENCY DEPT VISIT HI MDM: CPT | Mod: 25

## 2023-01-03 PROCEDURE — 82962 GLUCOSE BLOOD TEST: CPT

## 2023-01-03 PROCEDURE — 87389 HIV-1 AG W/HIV-1&-2 AB AG IA: CPT | Performed by: FAMILY MEDICINE

## 2023-01-03 PROCEDURE — 96360 HYDRATION IV INFUSION INIT: CPT

## 2023-01-03 PROCEDURE — 70450 CT HEAD/BRAIN W/O DYE: CPT | Mod: TC

## 2023-01-03 PROCEDURE — 85025 COMPLETE CBC W/AUTO DIFF WBC: CPT | Performed by: FAMILY MEDICINE

## 2023-01-03 PROCEDURE — 70450 CT HEAD WITHOUT CONTRAST: ICD-10-PCS | Mod: 26,,, | Performed by: RADIOLOGY

## 2023-01-03 RX ORDER — CLONIDINE HYDROCHLORIDE 0.1 MG/1
0.2 TABLET ORAL
Status: DISCONTINUED | OUTPATIENT
Start: 2023-01-03 | End: 2023-01-03 | Stop reason: HOSPADM

## 2023-01-03 RX ORDER — SODIUM CHLORIDE 9 MG/ML
1000 INJECTION, SOLUTION INTRAVENOUS
Status: COMPLETED | OUTPATIENT
Start: 2023-01-03 | End: 2023-01-03

## 2023-01-03 RX ADMIN — SODIUM CHLORIDE 1000 ML: 0.9 INJECTION, SOLUTION INTRAVENOUS at 02:01
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
## 2023-01-27 ENCOUNTER — HOSPITAL ENCOUNTER (EMERGENCY)
Facility: HOSPITAL | Age: 83
Discharge: HOME | End: 2023-01-27
Attending: EMERGENCY MEDICINE
Payer: OTHER GOVERNMENT

## 2023-01-27 VITALS
OXYGEN SATURATION: 96 % | WEIGHT: 200 LBS | HEIGHT: 67 IN | DIASTOLIC BLOOD PRESSURE: 98 MMHG | RESPIRATION RATE: 22 BRPM | BODY MASS INDEX: 31.39 KG/M2 | HEART RATE: 61 BPM | SYSTOLIC BLOOD PRESSURE: 170 MMHG | TEMPERATURE: 97 F

## 2023-01-27 DIAGNOSIS — S20.211A CONTUSION OF RIB ON RIGHT SIDE, INITIAL ENCOUNTER: Primary | ICD-10-CM

## 2023-01-27 DIAGNOSIS — S29.9XXA CHEST TRAUMA: ICD-10-CM

## 2023-01-27 PROCEDURE — 71100 XR RIBS 2 VIEW RIGHT: ICD-10-PCS | Mod: 26,RT,, | Performed by: RADIOLOGY

## 2023-01-27 PROCEDURE — 25000003 PHARM REV CODE 250: Performed by: EMERGENCY MEDICINE

## 2023-01-27 PROCEDURE — 99284 EMERGENCY DEPT VISIT MOD MDM: CPT

## 2023-01-27 PROCEDURE — 71100 X-RAY EXAM RIBS UNI 2 VIEWS: CPT | Mod: TC,RT

## 2023-01-27 PROCEDURE — 71100 X-RAY EXAM RIBS UNI 2 VIEWS: CPT | Mod: 26,RT,, | Performed by: RADIOLOGY

## 2023-01-27 RX ORDER — LIDOCAINE 50 MG/G
1 PATCH TOPICAL DAILY PRN
Qty: 10 PATCH | Refills: 0 | Status: SHIPPED | OUTPATIENT
Start: 2023-01-27

## 2023-01-27 RX ORDER — HYDROCODONE BITARTRATE AND ACETAMINOPHEN 7.5; 325 MG/1; MG/1
1 TABLET ORAL EVERY 6 HOURS PRN
Qty: 20 TABLET | Refills: 0 | Status: SHIPPED | OUTPATIENT
Start: 2023-01-27

## 2023-01-27 RX ORDER — HYDROCODONE BITARTRATE AND ACETAMINOPHEN 10; 325 MG/1; MG/1
1 TABLET ORAL
Status: COMPLETED | OUTPATIENT
Start: 2023-01-27 | End: 2023-01-27

## 2023-01-27 RX ADMIN — HYDROCODONE BITARTRATE AND ACETAMINOPHEN 1 TABLET: 10; 325 TABLET ORAL at 06:01
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
## 2023-05-08 ENCOUNTER — HOSPITAL ENCOUNTER (EMERGENCY)
Facility: HOSPITAL | Age: 83
Discharge: HOME | End: 2023-05-08
Payer: OTHER GOVERNMENT

## 2023-05-08 VITALS
HEART RATE: 89 BPM | TEMPERATURE: 98 F | RESPIRATION RATE: 18 BRPM | HEIGHT: 67 IN | SYSTOLIC BLOOD PRESSURE: 114 MMHG | BODY MASS INDEX: 32.02 KG/M2 | OXYGEN SATURATION: 97 % | DIASTOLIC BLOOD PRESSURE: 79 MMHG | WEIGHT: 204 LBS

## 2023-05-08 DIAGNOSIS — J18.9 PNEUMONIA OF RIGHT LOWER LOBE DUE TO INFECTIOUS ORGANISM: ICD-10-CM

## 2023-05-08 DIAGNOSIS — R50.9 FEVER: ICD-10-CM

## 2023-05-08 DIAGNOSIS — R11.2 NAUSEA AND VOMITING, UNSPECIFIED VOMITING TYPE: ICD-10-CM

## 2023-05-08 DIAGNOSIS — K52.9 GASTROENTERITIS: Primary | ICD-10-CM

## 2023-05-08 LAB
INFLUENZA A, MOLECULAR: NEGATIVE
INFLUENZA B, MOLECULAR: NEGATIVE
SARS-COV-2 RDRP RESP QL NAA+PROBE: NEGATIVE
SPECIMEN SOURCE: NORMAL

## 2023-05-08 PROCEDURE — 99284 EMERGENCY DEPT VISIT MOD MDM: CPT | Mod: 25

## 2023-05-08 PROCEDURE — 87502 INFLUENZA DNA AMP PROBE: CPT | Performed by: NURSE PRACTITIONER

## 2023-05-08 PROCEDURE — 71045 X-RAY EXAM CHEST 1 VIEW: CPT | Mod: 26,,, | Performed by: RADIOLOGY

## 2023-05-08 PROCEDURE — 71045 X-RAY EXAM CHEST 1 VIEW: CPT | Mod: TC

## 2023-05-08 PROCEDURE — 71045 XR CHEST AP PORTABLE: ICD-10-PCS | Mod: 26,,, | Performed by: RADIOLOGY

## 2023-05-08 PROCEDURE — U0002 COVID-19 LAB TEST NON-CDC: HCPCS | Performed by: NURSE PRACTITIONER

## 2023-05-08 RX ORDER — CLARITHROMYCIN 500 MG/1
500 TABLET, FILM COATED ORAL 2 TIMES DAILY
Qty: 14 TABLET | Refills: 0 | Status: SHIPPED | OUTPATIENT
Start: 2023-05-08 | End: 2023-05-08 | Stop reason: SDUPTHER

## 2023-05-08 RX ORDER — ONDANSETRON 4 MG/1
4 TABLET, FILM COATED ORAL EVERY 6 HOURS
Qty: 12 TABLET | Refills: 0 | Status: SHIPPED | OUTPATIENT
Start: 2023-05-08

## 2023-05-08 RX ORDER — CLARITHROMYCIN 500 MG/1
500 TABLET, FILM COATED ORAL 2 TIMES DAILY
Qty: 14 TABLET | Refills: 0 | Status: SHIPPED | OUTPATIENT
Start: 2023-05-08

## 2023-05-08 RX ORDER — ONDANSETRON 4 MG/1
4 TABLET, FILM COATED ORAL EVERY 6 HOURS
Qty: 12 TABLET | Refills: 0 | Status: SHIPPED | OUTPATIENT
Start: 2023-05-08 | End: 2023-05-08 | Stop reason: SDUPTHER

## 2023-05-08 NOTE — ED PROVIDER NOTES
Encounter Date: 2023       History     Chief Complaint   Patient presents with    Cough    Nasal Congestion    Nausea    Vomiting    Diarrhea     X 2 days       82-year-old casually dressed  male ambulatory to the emergency department with reports of general malaise, body aches, nausea, vomiting, and 2 bouts of diarrhea onset on yesterday.  States the feeling is the same as when he had pneumonia 2 months ago.    Review of patient's allergies indicates:   Allergen Reactions    Aspirin (bulk) Swelling    Ibuprofen     Sulfa (sulfonamide antibiotics)      Past Medical History:   Diagnosis Date    Hypertension      Past Surgical History:   Procedure Laterality Date    CARPAL TUNNEL RELEASE Bilateral     HIP ARTHROPLASTY Left      Family History   Problem Relation Age of Onset    No Known Problems Mother     No Known Problems Father      Social History     Tobacco Use    Smoking status: Former     Types: Cigarettes     Quit date: 1997     Years since quittin.0    Smokeless tobacco: Never   Substance Use Topics    Alcohol use: No    Drug use: No     Review of Systems   Constitutional:  Positive for chills, fatigue and fever.   HENT: Negative.     Eyes: Negative.    Respiratory:  Positive for cough.         Intermittent unproductive cough times 2 days.   Cardiovascular: Negative.    Gastrointestinal:  Positive for diarrhea, nausea and vomiting.        Vomiting x1 episode, diarrhea x2 episodes onset yesterday.   Endocrine: Negative.    Genitourinary: Negative.    Musculoskeletal: Negative.    Skin: Negative.    Allergic/Immunologic: Negative.    Neurological: Negative.    Hematological: Negative.    Psychiatric/Behavioral: Negative.     All other systems reviewed and are negative.    Physical Exam     Initial Vitals [23 0932]   BP Pulse Resp Temp SpO2   (!) 135/100 95 18 99.1 °F (37.3 °C) 95 %      MAP       --         Physical Exam    Nursing note and vitals reviewed.  Constitutional: He  appears well-developed and well-nourished.   HENT:   Head: Normocephalic and atraumatic.   Right Ear: External ear normal.   Left Ear: External ear normal.   Mouth/Throat: Oropharynx is clear and moist.   Eyes: EOM are normal. Pupils are equal, round, and reactive to light.   Neck: Neck supple.   Normal range of motion.  Cardiovascular:  Normal rate, regular rhythm, normal heart sounds and intact distal pulses.           Pulmonary/Chest: He has rhonchi.   Rhonchi to left upper lobe on inspiration.   Abdominal: Abdomen is soft. Bowel sounds are normal.   Musculoskeletal:         General: Normal range of motion.      Cervical back: Normal range of motion and neck supple.     Neurological: He is alert and oriented to person, place, and time. He has normal strength.   Skin: Skin is warm and dry. Capillary refill takes 2 to 3 seconds.   Psychiatric: He has a normal mood and affect. His behavior is normal. Judgment and thought content normal.       ED Course   Procedures  Labs Reviewed   INFLUENZA A & B BY MOLECULAR   SARS-COV-2 RNA AMPLIFICATION, QUAL          Imaging Results    None       X-Rays:   Independently Interpreted Readings:   Other Readings:  X-ray reveals possible developing infiltrate to the right lower lobe.  Medications - No data to display  Medical Decision Making:   Initial Assessment:   Abdomen is soft protuberant, heart is regular rate and rhythm without murmur, fine rhonchi to left upper lobe, no wheezing, no retraction, denies dyspnea, oropharynx pink and moist, bilateral nares are patent, bilateral turbinates are free of injection, clear drainage is present bilaterally, bilateral auditory canals are clear, tympanic membranes  Differential Diagnosis:   Are visible and intact.  Influenza, gastroenteritis, COVID-19.  Clinical Tests:   Lab Tests: Ordered  Radiological Study: Ordered                        Clinical Impression:                 Davey Desai NP  05/08/23 111

## 2023-05-08 NOTE — DISCHARGE INSTRUCTIONS
Zofran for nausea  Increase oral fluids  Tylenol or Motrin for fever or discomfort  Breathing exercises as discussed 4 times daily  Imodium for loose stools up to 3 doses daily  Follow up with your primary care physician later this week for recheck\  Return for worsening

## 2023-11-06 ENCOUNTER — HOSPITAL ENCOUNTER (EMERGENCY)
Facility: HOSPITAL | Age: 83
Discharge: HOME | End: 2023-11-06
Attending: EMERGENCY MEDICINE
Payer: OTHER GOVERNMENT

## 2023-11-06 VITALS
TEMPERATURE: 99 F | DIASTOLIC BLOOD PRESSURE: 110 MMHG | SYSTOLIC BLOOD PRESSURE: 158 MMHG | HEIGHT: 67 IN | WEIGHT: 200 LBS | HEART RATE: 67 BPM | RESPIRATION RATE: 20 BRPM | BODY MASS INDEX: 31.39 KG/M2 | OXYGEN SATURATION: 97 %

## 2023-11-06 DIAGNOSIS — R53.1 GENERALIZED WEAKNESS: ICD-10-CM

## 2023-11-06 DIAGNOSIS — M25.551 RIGHT HIP PAIN: Primary | ICD-10-CM

## 2023-11-06 LAB
ALBUMIN SERPL BCP-MCNC: 3.6 G/DL (ref 3.5–5.2)
ALP SERPL-CCNC: 107 U/L (ref 55–135)
ALT SERPL W/O P-5'-P-CCNC: 16 U/L (ref 10–44)
ANION GAP SERPL CALC-SCNC: 13 MMOL/L (ref 8–16)
AST SERPL-CCNC: 18 U/L (ref 10–40)
BASOPHILS # BLD AUTO: 0.04 K/UL (ref 0–0.2)
BASOPHILS NFR BLD: 0.5 % (ref 0–1.9)
BILIRUB SERPL-MCNC: 0.4 MG/DL (ref 0.1–1)
BILIRUB UR QL STRIP: NEGATIVE
BUN SERPL-MCNC: 19 MG/DL (ref 8–23)
CALCIUM SERPL-MCNC: 8.4 MG/DL (ref 8.7–10.5)
CHLORIDE SERPL-SCNC: 108 MMOL/L (ref 95–110)
CLARITY UR: CLEAR
CO2 SERPL-SCNC: 21 MMOL/L (ref 23–29)
COLOR UR: YELLOW
CREAT SERPL-MCNC: 1.1 MG/DL (ref 0.5–1.4)
DIFFERENTIAL METHOD: ABNORMAL
EOSINOPHIL # BLD AUTO: 0.1 K/UL (ref 0–0.5)
EOSINOPHIL NFR BLD: 1.8 % (ref 0–8)
ERYTHROCYTE [DISTWIDTH] IN BLOOD BY AUTOMATED COUNT: 12.9 % (ref 11.5–14.5)
EST. GFR  (NO RACE VARIABLE): >60 ML/MIN/1.73 M^2
GLUCOSE SERPL-MCNC: 103 MG/DL (ref 70–110)
GLUCOSE UR QL STRIP: NEGATIVE
HCT VFR BLD AUTO: 37.3 % (ref 40–54)
HGB BLD-MCNC: 12.5 G/DL (ref 14–18)
HGB UR QL STRIP: NEGATIVE
IMM GRANULOCYTES # BLD AUTO: 0.02 K/UL (ref 0–0.04)
IMM GRANULOCYTES NFR BLD AUTO: 0.3 % (ref 0–0.5)
KETONES UR QL STRIP: NEGATIVE
LEUKOCYTE ESTERASE UR QL STRIP: NEGATIVE
LYMPHOCYTES # BLD AUTO: 1.7 K/UL (ref 1–4.8)
LYMPHOCYTES NFR BLD: 22.9 % (ref 18–48)
MCH RBC QN AUTO: 30.6 PG (ref 27–31)
MCHC RBC AUTO-ENTMCNC: 33.5 G/DL (ref 32–36)
MCV RBC AUTO: 91 FL (ref 82–98)
MONOCYTES # BLD AUTO: 0.5 K/UL (ref 0.3–1)
MONOCYTES NFR BLD: 7.2 % (ref 4–15)
NEUTROPHILS # BLD AUTO: 5 K/UL (ref 1.8–7.7)
NEUTROPHILS NFR BLD: 67.3 % (ref 38–73)
NITRITE UR QL STRIP: NEGATIVE
NRBC BLD-RTO: 0 /100 WBC
PH UR STRIP: 6 [PH] (ref 5–8)
PLATELET # BLD AUTO: 191 K/UL (ref 150–450)
PMV BLD AUTO: 9.9 FL (ref 9.2–12.9)
POTASSIUM SERPL-SCNC: 4.2 MMOL/L (ref 3.5–5.1)
PROT SERPL-MCNC: 7 G/DL (ref 6–8.4)
PROT UR QL STRIP: ABNORMAL
RBC # BLD AUTO: 4.09 M/UL (ref 4.6–6.2)
SODIUM SERPL-SCNC: 142 MMOL/L (ref 136–145)
SP GR UR STRIP: 1.02 (ref 1–1.03)
URN SPEC COLLECT METH UR: ABNORMAL
UROBILINOGEN UR STRIP-ACNC: 1 EU/DL
WBC # BLD AUTO: 7.41 K/UL (ref 3.9–12.7)

## 2023-11-06 PROCEDURE — 73502 X-RAY EXAM HIP UNI 2-3 VIEWS: CPT | Mod: 26,RT,, | Performed by: RADIOLOGY

## 2023-11-06 PROCEDURE — 96372 THER/PROPH/DIAG INJ SC/IM: CPT | Performed by: NURSE PRACTITIONER

## 2023-11-06 PROCEDURE — 80053 COMPREHEN METABOLIC PANEL: CPT | Performed by: NURSE PRACTITIONER

## 2023-11-06 PROCEDURE — 93005 ELECTROCARDIOGRAM TRACING: CPT

## 2023-11-06 PROCEDURE — 73502 X-RAY EXAM HIP UNI 2-3 VIEWS: CPT | Mod: TC,RT

## 2023-11-06 PROCEDURE — 85025 COMPLETE CBC W/AUTO DIFF WBC: CPT | Performed by: NURSE PRACTITIONER

## 2023-11-06 PROCEDURE — 93010 EKG 12-LEAD: ICD-10-PCS | Mod: ,,, | Performed by: INTERNAL MEDICINE

## 2023-11-06 PROCEDURE — 81003 URINALYSIS AUTO W/O SCOPE: CPT | Performed by: NURSE PRACTITIONER

## 2023-11-06 PROCEDURE — 63600175 PHARM REV CODE 636 W HCPCS: Performed by: NURSE PRACTITIONER

## 2023-11-06 PROCEDURE — 73502 XR HIP WITH PELVIS WHEN PERFORMED, 2 OR 3  VIEWS RIGHT: ICD-10-PCS | Mod: 26,RT,, | Performed by: RADIOLOGY

## 2023-11-06 PROCEDURE — 93010 ELECTROCARDIOGRAM REPORT: CPT | Mod: ,,, | Performed by: INTERNAL MEDICINE

## 2023-11-06 PROCEDURE — 99285 EMERGENCY DEPT VISIT HI MDM: CPT

## 2023-11-06 RX ORDER — METHYLPREDNISOLONE ACETATE 80 MG/ML
80 INJECTION, SUSPENSION INTRA-ARTICULAR; INTRALESIONAL; INTRAMUSCULAR; SOFT TISSUE
Status: COMPLETED | OUTPATIENT
Start: 2023-11-06 | End: 2023-11-06

## 2023-11-06 RX ADMIN — METHYLPREDNISOLONE ACETATE 80 MG: 80 INJECTION, SUSPENSION INTRA-ARTICULAR; INTRALESIONAL; INTRAMUSCULAR; SOFT TISSUE at 06:11

## 2023-11-06 NOTE — DISCHARGE INSTRUCTIONS
Ice to Right hip 5 minutes every 4 hours for pain  Continue lidocaine patches to right hip  Tylenol for pain refractory to patches and ICE  Follow up with orthopedist at [facility] for Right hip evaluation

## 2023-11-06 NOTE — ED PROVIDER NOTES
Encounter Date: 2023       History     Chief Complaint   Patient presents with    Weakness     Pt. C/o feeling weak today.     Hip Pain     R hip pain. Denies injury.     83 year old while male with history of hyperlipidemia, HTN, chronic liver dz, and surgical history of L hip replacement presents to ED per [facility] with complaint of acute on chronic R hip pain exacerbated by weight bearing and leg flexion mitigated unsatisfactorily by tylenol.  States he was seen at [facility] 2 weeks ago and Rx lidocaine patches for pain.  Reports he felt weak when trying to lift himself up in the chair to reposition for pain relief.        Review of patient's allergies indicates:   Allergen Reactions    Aspirin (bulk) Swelling    Ibuprofen     Sulfa (sulfonamide antibiotics)      Past Medical History:   Diagnosis Date    Hypertension      Past Surgical History:   Procedure Laterality Date    CARPAL TUNNEL RELEASE Bilateral     HIP ARTHROPLASTY Left      Family History   Problem Relation Age of Onset    No Known Problems Mother     No Known Problems Father      Social History     Tobacco Use    Smoking status: Former     Current packs/day: 0.00     Types: Cigarettes     Quit date: 1997     Years since quittin.5    Smokeless tobacco: Never   Substance Use Topics    Alcohol use: No    Drug use: No     Review of Systems   Constitutional: Negative.    HENT: Negative.     Eyes: Negative.    Respiratory: Negative.     Cardiovascular: Negative.    Gastrointestinal: Negative.    Endocrine: Negative.    Genitourinary: Negative.    Musculoskeletal:  Positive for arthralgias, gait problem and myalgias.        R arm and hip pain, pain is worsened by weight bearing.     Skin: Negative.    Allergic/Immunologic: Negative.    Hematological: Negative.    Psychiatric/Behavioral: Negative.     All other systems reviewed and are negative.      Physical Exam     Initial Vitals [23 1501]   BP Pulse Resp Temp SpO2   (!) 163/94 67 20 98.5 °F  (36.9 °C) 96 %      MAP       --         Physical Exam    Nursing note and vitals reviewed.  Constitutional: He appears well-developed and well-nourished.   HENT:   Head: Normocephalic and atraumatic.   Nose: Nose normal.   Mouth/Throat: Oropharynx is clear and moist.   Eyes: Conjunctivae and EOM are normal. Pupils are equal, round, and reactive to light.   Neck: Neck supple.   Normal range of motion.  Cardiovascular:  Normal rate, regular rhythm, normal heart sounds and intact distal pulses.           Pulmonary/Chest: Breath sounds normal.   Abdominal: Abdomen is soft. Bowel sounds are normal. He exhibits no mass. There is no abdominal tenderness. There is no rebound.   Musculoskeletal:         General: No tenderness or edema. Normal range of motion.      Cervical back: Normal range of motion and neck supple.      Comments: R hip pain with R leg flexion     Neurological: He is alert and oriented to person, place, and time. He has normal strength. GCS score is 15. GCS eye subscore is 4. GCS verbal subscore is 5. GCS motor subscore is 6.   Skin: Skin is warm and dry. Capillary refill takes 2 to 3 seconds.   Psychiatric: He has a normal mood and affect. His behavior is normal. Judgment and thought content normal.         ED Course   Procedures  Labs Reviewed   CBC W/ AUTO DIFFERENTIAL   COMPREHENSIVE METABOLIC PANEL   URINALYSIS, REFLEX TO URINE CULTURE   POCT GLUCOSE MONITORING CONTINUOUS     EKG Readings: (Independently Interpreted)   Initial Reading: No STEMI. Rhythm: Normal Sinus Rhythm. Ectopy: PVCs. Conduction: Normal. ST Segments: Normal ST Segments. T Waves: Normal. 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      Electronically signed by: Simone Belle  Date:    11/06/2023  Time:    16:36               Narrative:    EXAMINATION:  XR HIP WITH PELVIS WHEN PERFORMED, 2 OR 3  VIEWS RIGHT    CLINICAL HISTORY:  pain;    TECHNIQUE:  AP pelvis and two views of the right hip.    COMPARISON:  09/08/2022.    FINDINGS:  No acute fracture or dislocation.  No significant soft tissue swelling.    Femoral head is normally positioned within the native acetabulum.  Moderate femoroacetabular degenerative osteoarthrosis with joint space narrowing and osteophyte formation.    Pubic symphysis is intact.  SI joints are intact.  Bilateral pubic rami are intact.  Remote ORIF of the left pelvis.  There is bone demineralization.                                    X-Rays:   Independently Interpreted Readings:   Other Readings:  Moderate degenerative osteoarthrosis of the right hip.    Medications - No data to display  Medical Decision Making  Heart is RRR, lungs CTA, no tenderness to R hip capsule, pain to R hip is exacerbated by flexion or R leg, dorsiflexion and plantar flexion are normal and equal bilaterally, CN 2-12 grossly intact, he is significantly HO but can understand with verbal volume amplification, his hearing aids appear ineffective due to feedback, abdomen is soft and nontender, no bladder irritation, denies chills or fever.      Amount and/or Complexity of Data Reviewed  Labs: ordered.     Details: Urine is normal, CBC documents slight anemia, CMP is grossly normal  Radiology: ordered. Decision-making details documented in ED Course.  Discussion of management or test interpretation with external provider(s): Depomedrol given IM, discharge with instructions to continue lidocaine patches to R hip, tylenol for pain, follow up with [facility] at [facility] as scheduled to evaluate R hip pain.                                 Clinical Impression:   Final diagnoses:  [R53.1] Generalized weakness  [M25.551] Right hip pain (Primary)                Davey Desai NP  11/06/23 181

## 2023-11-06 NOTE — FIRST PROVIDER EVALUATION
Emergency Department TeleTriage Encounter Note      CHIEF COMPLAINT    Chief Complaint   Patient presents with    Weakness     Pt. C/o feeling weak today.        VITAL SIGNS   Initial Vitals [11/06/23 1501]   BP Pulse Resp Temp SpO2   (!) 163/94 67 20 98.5 °F (36.9 °C) 96 %      MAP       --            ALLERGIES    Review of patient's allergies indicates:   Allergen Reactions    Aspirin (bulk) Swelling    Ibuprofen     Sulfa (sulfonamide antibiotics)        PROVIDER TRIAGE NOTE  Verbal consent for the teletriage evaluation was given by the patient at the start of the evaluation.  All efforts will be made to maintain patient's privacy during the evaluation.      This is a teletriage evaluation of a 83 y.o. male presenting to the ED per daughter with c/o right hip pain and generalized weakness that started today. Limited physical exam via telehealth: The patient is awake, alert, answering questions appropriately and is not in respiratory distress.  As the Teletriage provider, I performed an initial assessment and ordered appropriate labs and imaging studies, if any, to facilitate the patient's care once placed in the ED. Once a room is available, care and a full evaluation will be completed by an alternate ED provider.  Any additional orders and the final disposition will be determined by that provider.  All imaging and labs will not be followed-up by the Teletriage Team, including myself.          ORDERS  Labs Reviewed - No data to display    ED Orders (720h ago, onward)      Start Ordered     Status Ordering Provider    11/06/23 1605 11/06/23 1604  Cardiac Monitoring - Adult  Continuous        Comments: Notify Physician If:    Ordered KEYUR LLOYD    11/06/23 1605 11/06/23 1604  EKG 12-lead  Once         Ordered KEYUR LLOYD    11/06/23 1605 11/06/23 1604  CBC auto differential  STAT         Ordered KEYUR LLOYD    11/06/23 1605 11/06/23 1604  Comprehensive metabolic panel  STAT         Ordered KEYUR LLOYD     11/06/23 1605 11/06/23 1604  Urinalysis, Reflex to Urine Culture Urine, Clean Catch  STAT         Ordered KEYUR LLOYD    11/06/23 1605 11/06/23 1604  POCT glucose  Once         Ordered KEYUR LLOYD    11/06/23 1605 11/06/23 1604  X-Ray Hip 2 or 3 views Right (with Pelvis when performed)  1 time imaging         Ordered PREMAKEYUR    11/06/23 1604 11/06/23 1604  Saline lock IV  Once         Ordered PREMA KEYUR NICOLAS    11/06/23 1604 11/06/23 1604  Pulse Oximetry Continuous  Continuous         Ordered PREMAKEYUR BETANCUR NICOLAS              Virtual Visit Note: The provider triage portion of this emergency department evaluation and documentation was performed via [employer], a HIPAA-compliant telemedicine application, in concert with a tele-presenter in the room. A face to face patient evaluation with one of my colleagues will occur once the patient is placed in an emergency department room.      DISCLAIMER: This note was prepared with [email or web address] voice recognition transcription software. Garbled syntax, mangled pronouns, and other bizarre constructions may be attributed to that software system.

## 2024-01-21 ENCOUNTER — HOSPITAL ENCOUNTER (EMERGENCY)
Facility: HOSPITAL | Age: 84
Discharge: HOME | End: 2024-01-21
Attending: EMERGENCY MEDICINE
Payer: OTHER GOVERNMENT

## 2024-01-21 VITALS
WEIGHT: 200 LBS | OXYGEN SATURATION: 97 % | HEIGHT: 67 IN | DIASTOLIC BLOOD PRESSURE: 91 MMHG | BODY MASS INDEX: 31.39 KG/M2 | HEART RATE: 86 BPM | RESPIRATION RATE: 14 BRPM | SYSTOLIC BLOOD PRESSURE: 144 MMHG | TEMPERATURE: 98 F

## 2024-01-21 DIAGNOSIS — R29.818 ACUTE FOCAL NEUROLOGICAL DEFICIT: ICD-10-CM

## 2024-01-21 DIAGNOSIS — R51.9 ACUTE NONINTRACTABLE HEADACHE, UNSPECIFIED HEADACHE TYPE: ICD-10-CM

## 2024-01-21 DIAGNOSIS — I10 HYPERTENSION, UNSPECIFIED TYPE: Primary | ICD-10-CM

## 2024-01-21 LAB
ALBUMIN SERPL BCP-MCNC: 3.8 G/DL (ref 3.5–5.2)
ALP SERPL-CCNC: 88 U/L (ref 55–135)
ALT SERPL W/O P-5'-P-CCNC: 16 U/L (ref 10–44)
ANION GAP SERPL CALC-SCNC: 13 MMOL/L (ref 8–16)
AST SERPL-CCNC: 17 U/L (ref 10–40)
BASOPHILS # BLD AUTO: 0.06 K/UL (ref 0–0.2)
BASOPHILS NFR BLD: 0.6 % (ref 0–1.9)
BILIRUB SERPL-MCNC: 0.6 MG/DL (ref 0.1–1)
BUN SERPL-MCNC: 23 MG/DL (ref 8–23)
CALCIUM SERPL-MCNC: 8.6 MG/DL (ref 8.7–10.5)
CHLORIDE SERPL-SCNC: 102 MMOL/L (ref 95–110)
CHOLEST SERPL-MCNC: 136 MG/DL (ref 120–199)
CHOLEST/HDLC SERPL: 3.2 {RATIO} (ref 2–5)
CO2 SERPL-SCNC: 24 MMOL/L (ref 23–29)
CREAT SERPL-MCNC: 1 MG/DL (ref 0.5–1.4)
DIFFERENTIAL METHOD BLD: ABNORMAL
EOSINOPHIL # BLD AUTO: 0.3 K/UL (ref 0–0.5)
EOSINOPHIL NFR BLD: 3.1 % (ref 0–8)
ERYTHROCYTE [DISTWIDTH] IN BLOOD BY AUTOMATED COUNT: 12.5 % (ref 11.5–14.5)
EST. GFR  (NO RACE VARIABLE): >60 ML/MIN/1.73 M^2
GLUCOSE SERPL-MCNC: 170 MG/DL (ref 70–110)
HCT VFR BLD AUTO: 36.2 % (ref 40–54)
HDLC SERPL-MCNC: 42 MG/DL (ref 40–75)
HDLC SERPL: 30.9 % (ref 20–50)
HGB BLD-MCNC: 12.4 G/DL (ref 14–18)
IMM GRANULOCYTES # BLD AUTO: 0.04 K/UL (ref 0–0.04)
IMM GRANULOCYTES NFR BLD AUTO: 0.4 % (ref 0–0.5)
INR PPP: 1.1 (ref 0.8–1.2)
LDLC SERPL CALC-MCNC: 77.6 MG/DL (ref 63–159)
LYMPHOCYTES # BLD AUTO: 1 K/UL (ref 1–4.8)
LYMPHOCYTES NFR BLD: 10.9 % (ref 18–48)
MCH RBC QN AUTO: 31.2 PG (ref 27–31)
MCHC RBC AUTO-ENTMCNC: 34.3 G/DL (ref 32–36)
MCV RBC AUTO: 91 FL (ref 82–98)
MONOCYTES # BLD AUTO: 0.6 K/UL (ref 0.3–1)
MONOCYTES NFR BLD: 6.5 % (ref 4–15)
NEUTROPHILS # BLD AUTO: 7.3 K/UL (ref 1.8–7.7)
NEUTROPHILS NFR BLD: 78.5 % (ref 38–73)
NONHDLC SERPL-MCNC: 94 MG/DL
NRBC BLD-RTO: 0 /100 WBC
PLATELET # BLD AUTO: 197 K/UL (ref 150–450)
PMV BLD AUTO: 9.6 FL (ref 9.2–12.9)
POTASSIUM SERPL-SCNC: 3.9 MMOL/L (ref 3.5–5.1)
PROT SERPL-MCNC: 7.2 G/DL (ref 6–8.4)
PROTHROMBIN TIME: 11.6 SEC (ref 9–12.5)
RBC # BLD AUTO: 3.97 M/UL (ref 4.6–6.2)
SODIUM SERPL-SCNC: 139 MMOL/L (ref 136–145)
TRIGL SERPL-MCNC: 82 MG/DL (ref 30–150)
TSH SERPL DL<=0.005 MIU/L-ACNC: 1.02 UIU/ML (ref 0.4–4)
WBC # BLD AUTO: 9.27 K/UL (ref 3.9–12.7)

## 2024-01-21 PROCEDURE — 25000003 PHARM REV CODE 250: Performed by: EMERGENCY MEDICINE

## 2024-01-21 PROCEDURE — 85610 PROTHROMBIN TIME: CPT | Performed by: EMERGENCY MEDICINE

## 2024-01-21 PROCEDURE — 70450 CT HEAD/BRAIN W/O DYE: CPT | Mod: TC

## 2024-01-21 PROCEDURE — 80061 LIPID PANEL: CPT | Performed by: EMERGENCY MEDICINE

## 2024-01-21 PROCEDURE — 87389 HIV-1 AG W/HIV-1&-2 AB AG IA: CPT | Performed by: EMERGENCY MEDICINE

## 2024-01-21 PROCEDURE — 93005 ELECTROCARDIOGRAM TRACING: CPT

## 2024-01-21 PROCEDURE — 99285 EMERGENCY DEPT VISIT HI MDM: CPT | Mod: 25

## 2024-01-21 PROCEDURE — 86803 HEPATITIS C AB TEST: CPT | Performed by: EMERGENCY MEDICINE

## 2024-01-21 PROCEDURE — 71045 X-RAY EXAM CHEST 1 VIEW: CPT | Mod: 26,,, | Performed by: RADIOLOGY

## 2024-01-21 PROCEDURE — 84443 ASSAY THYROID STIM HORMONE: CPT | Performed by: EMERGENCY MEDICINE

## 2024-01-21 PROCEDURE — 93010 ELECTROCARDIOGRAM REPORT: CPT | Mod: ,,, | Performed by: INTERNAL MEDICINE

## 2024-01-21 PROCEDURE — 80053 COMPREHEN METABOLIC PANEL: CPT | Performed by: EMERGENCY MEDICINE

## 2024-01-21 PROCEDURE — 96374 THER/PROPH/DIAG INJ IV PUSH: CPT

## 2024-01-21 PROCEDURE — 63600175 PHARM REV CODE 636 W HCPCS: Performed by: EMERGENCY MEDICINE

## 2024-01-21 PROCEDURE — 71045 X-RAY EXAM CHEST 1 VIEW: CPT | Mod: TC

## 2024-01-21 PROCEDURE — 85025 COMPLETE CBC W/AUTO DIFF WBC: CPT | Performed by: EMERGENCY MEDICINE

## 2024-01-21 PROCEDURE — 70450 CT HEAD/BRAIN W/O DYE: CPT | Mod: 26,,, | Performed by: RADIOLOGY

## 2024-01-21 RX ORDER — LISINOPRIL AND HYDROCHLOROTHIAZIDE 12.5; 2 MG/1; MG/1
1 TABLET ORAL DAILY
Qty: 30 TABLET | Refills: 0 | Status: SHIPPED | OUTPATIENT
Start: 2024-01-21 | End: 2024-01-21

## 2024-01-21 RX ORDER — ACETAMINOPHEN 500 MG
1000 TABLET ORAL
Status: COMPLETED | OUTPATIENT
Start: 2024-01-21 | End: 2024-01-21

## 2024-01-21 RX ORDER — CLONIDINE HYDROCHLORIDE 0.1 MG/1
0.1 TABLET ORAL
Status: COMPLETED | OUTPATIENT
Start: 2024-01-21 | End: 2024-01-21

## 2024-01-21 RX ORDER — LISINOPRIL AND HYDROCHLOROTHIAZIDE 12.5; 2 MG/1; MG/1
1 TABLET ORAL DAILY
Qty: 30 TABLET | Refills: 0 | Status: SHIPPED | OUTPATIENT
Start: 2024-01-21

## 2024-01-21 RX ORDER — HYDRALAZINE HYDROCHLORIDE 20 MG/ML
10 INJECTION INTRAMUSCULAR; INTRAVENOUS
Status: COMPLETED | OUTPATIENT
Start: 2024-01-21 | End: 2024-01-21

## 2024-01-21 RX ADMIN — CLONIDINE HYDROCHLORIDE 0.1 MG: 0.1 TABLET ORAL at 07:01

## 2024-01-21 RX ADMIN — ACETAMINOPHEN 1000 MG: 500 TABLET ORAL at 07:01

## 2024-01-21 RX ADMIN — HYDRALAZINE HYDROCHLORIDE 10 MG: 20 INJECTION INTRAMUSCULAR; INTRAVENOUS at 07:01

## 2024-01-22 LAB
HCV AB SERPL QL IA: NORMAL
HIV 1+2 AB+HIV1 P24 AG SERPL QL IA: NORMAL

## 2024-01-22 NOTE — ED NOTES
Called pts daughter regarding dispo and transportation. Luma reports she will arrive in approx 15 mins.

## 2024-01-22 NOTE — ED PROVIDER NOTES
"Encounter Date: 1/21/2024       History     Chief Complaint   Patient presents with    Headache     HA x several hours.    Fatigue     Pt reports generalized weakness several hours ago causing patient to fall "my whole body went numb" - now resolved per patient    Diarrhea    Abdominal Pain     Pt here with c/o HA, gen weakness, nausea and diarrhea with abd cramping onset today. HA frontal with gradual onset. No focal weakness but he says his fingers were numb earlier. This has resolved but HA still present. No hx of migraines but he does have HTN and gets HA when his BP gets high. Loose stools since  yesterday. No recent abx. No bleeding or lack stools.     The history is provided by the patient.   Headache   This is a recurrent problem. The current episode started yesterday. The problem occurs constantly. The problem has been unchanged. The pain is located in the Frontal region. The pain does not radiate. The quality of the pain is described as aching and throbbing. The pain is at a severity of 7/10. Associated symptoms include nausea, numbness and tingling. Pertinent negatives include no abdominal pain, abnormal behavior, anorexia, back pain, blurred vision, coughing, dizziness, drainage, ear pain, eye pain, eye redness, eye watering, facial sweating, fever, hearing loss, insomnia, loss of balance, muscle aches, neck pain, phonophobia, photophobia, rhinorrhea, scalp tenderness, seizures, sinus pressure, sore throat, swollen glands, tinnitus, visual change, vomiting, weakness or weight loss. Nothing aggravates the symptoms. He has tried nothing for the symptoms. His past medical history is significant for hypertension.     Review of patient's allergies indicates:   Allergen Reactions    Aspirin Swelling     Other Reaction(s): Facial pain or swelling, Urticaria, Eruption of skin, Urticaria, Eruption of skin    Aspirin (bulk) Swelling    Attapulgite     Darunavir      Other Reaction(s): Drowsy, Nausea and Vomiting, " Drowsy, Nausea and Vomiting    Diphenhydramine Hives    Docusate      Other Reaction(s): Angioedema    Ibuprofen     Naproxen     Sulfa (sulfonamide antibiotics)      Past Medical History:   Diagnosis Date    Hypertension      Past Surgical History:   Procedure Laterality Date    CARPAL TUNNEL RELEASE Bilateral     HIP ARTHROPLASTY Left      Family History   Problem Relation Age of Onset    No Known Problems Mother     No Known Problems Father      Social History     Tobacco Use    Smoking status: Former     Current packs/day: 0.00     Types: Cigarettes     Quit date: 1997     Years since quittin.7    Smokeless tobacco: Never   Substance Use Topics    Alcohol use: No    Drug use: No     Review of Systems   Constitutional:  Negative for fever and weight loss.   HENT:  Negative for ear pain, hearing loss, rhinorrhea, sinus pressure, sore throat and tinnitus.    Eyes:  Negative for blurred vision, photophobia, pain and redness.   Respiratory:  Negative for cough.    Gastrointestinal:  Positive for diarrhea and nausea. Negative for abdominal pain, anorexia and vomiting.   Musculoskeletal:  Negative for back pain and neck pain.   Neurological:  Positive for tingling, numbness and headaches. Negative for dizziness, seizures, weakness and loss of balance.   Psychiatric/Behavioral:  The patient does not have insomnia.    All other systems reviewed and are negative.      Physical Exam     Initial Vitals [24 1837]   BP Pulse Resp Temp SpO2   (!) 172/142 79 20 97.6 °F (36.4 °C) 98 %      MAP       --         Physical Exam    Nursing note and vitals reviewed.  Constitutional: He appears well-developed and well-nourished. He is not diaphoretic. No distress.   Elderly WM in nad. Difficult hx 2/2 [place].    HENT:   Head: Normocephalic and atraumatic.   Mouth/Throat: Oropharynx is clear and moist.   Eyes: Conjunctivae and EOM are normal. Pupils are equal, round, and reactive to light. No scleral icterus.   Neck: Neck  supple. No JVD present.   Normal range of motion.  Cardiovascular:  Normal rate, regular rhythm, normal heart sounds and intact distal pulses.           Pulmonary/Chest: Breath sounds normal. He exhibits no tenderness.   Abdominal: Abdomen is soft. Bowel sounds are normal. There is no abdominal tenderness.   Musculoskeletal:         General: No tenderness or edema. Normal range of motion.      Cervical back: Normal range of motion and neck supple.     Neurological: He is alert and oriented to person, place, and time. GCS score is 15. GCS eye subscore is 4. GCS verbal subscore is 5. GCS motor subscore is 6.   Generally weak, nothing focal. Gait deferred.    Skin: Skin is warm and dry. Capillary refill takes less than 2 seconds. No rash noted. No erythema. No pallor.   Psychiatric: He has a normal mood and affect.         ED Course   Procedures  Labs Reviewed   CBC W/ AUTO DIFFERENTIAL - Abnormal; Notable for the following components:       Result Value    RBC 3.97 (*)     Hemoglobin 12.4 (*)     Hematocrit 36.2 (*)     MCH 31.2 (*)     Gran % 78.5 (*)     Lymph % 10.9 (*)     All other components within normal limits   COMPREHENSIVE METABOLIC PANEL - Abnormal; Notable for the following components:    Glucose 170 (*)     Calcium 8.6 (*)     All other components within normal limits   PROTIME-INR   TSH   LIPID PANEL   HIV 1 / 2 ANTIBODY   HEPATITIS C ANTIBODY     EKG Readings: (Independently Interpreted)   Rhythm: Normal Sinus Rhythm. Heart Rate: 69. Ectopy: No Ectopy. Conduction: Normal. ST Segments: Normal ST Segments. T Waves: Normal. Axis: Normal. Clinical Impression: Normal Sinus Rhythm       Imaging Results              X-Ray Chest AP Portable (Final result)  Result time 01/21/24 19:44:10      Final result by Gerardo Little MD (01/21/24 19:44:10)                   Impression:      As above.      Electronically signed by: Gerardo Little  Date:    01/21/2024  Time:    19:44               Narrative:     EXAMINATION:  XR CHEST AP PORTABLE    CLINICAL HISTORY:  sob;    TECHNIQUE:  Single frontal view of the chest was performed.    COMPARISON:  05/08/2023    FINDINGS:  Emphysematous changes of the lungs.  Trace right pleural effusion versus scarring.  No focal consolidation.  Borderline enlarged cardiac silhouette.                                       CT Head Without Contrast (Final result)  Result time 01/21/24 19:03:59      Final result by Gerardo Little MD (01/21/24 19:03:59)                   Impression:      No acute abnormality.      Electronically signed by: Gerardo Little  Date:    01/21/2024  Time:    19:03               Narrative:    EXAMINATION:  CT HEAD WITHOUT CONTRAST    CLINICAL HISTORY:  Headache, new or worsening (Age >= 50y);    TECHNIQUE:  Low dose axial CT images obtained throughout the head without intravenous contrast. Sagittal and coronal reconstructions were performed.    COMPARISON:  01/03/2023    FINDINGS:  Intracranial compartment:    Ventricles and sulci are normal in size for age without evidence of hydrocephalus. No extra-axial blood or fluid collections.    Moderate periventricular and deep white matter changes of chronic microvascular ischemia.  Small old bilateral lacunar infarcts of the basal ganglia.  No parenchymal mass, hemorrhage, edema or major vascular distribution infarct.    Skull/extracranial contents (limited evaluation): No fracture. Mild left sphenoid sinus disease.  Paranasal sinuses are otherwise essentially clear.  Mastoid air cells are clear.                                       Medications   acetaminophen tablet 1,000 mg (1,000 mg Oral Given 1/21/24 1921)   hydrALAZINE injection 10 mg (10 mg Intravenous Given 1/21/24 1925)   cloNIDine tablet 0.1 mg (0.1 mg Oral Given 1/21/24 1923)     Medical Decision Making  Pt presented with HA and elevated BP. He had some paresthesias but these resolved pta. Neuro intact here. CT head neg. He was given hydralazine  10mg IV and clonidine 0.1mg po for his elevated BP. Tylenol for his HA. Symptoms improved with treatment. Pt had BM x2 here and it was not diarrhea. Normal formed stool. Benign abd exam. CBC, CMP, TSH, INR and lipid panel unremarkable. Pt's main complaint was his HA and elevated BP. He take lisinopril 10mg daily. This will be changed to 20/12.5 and he was instructed to f/u with his PCP this week. EKG and CXR unremarkable. Return precautions discussed.     Amount and/or Complexity of Data Reviewed  Labs: ordered. Decision-making details documented in ED Course.  Radiology: ordered. Decision-making details documented in ED Course.  ECG/medicine tests: ordered and independent interpretation performed. Decision-making details documented in ED Course.    Risk  OTC drugs.  Prescription drug management.               ED Course as of 01/21/24 2039   Sun Jan 21, 2024 1939 Pt's HA and numbness 2/2 his elevated BP. Meds given. CT head reviewed. Rads read pending.  [DC]   2028 Pt with several BM's here. He does not have diarrhea. Hard formed stool.  [DC]      ED Course User Index  [DC] Rusty Downey Jr., MD                           Clinical Impression:  Final diagnoses:  [R29.818] Acute focal neurological deficit  [I10] Hypertension, unspecified type (Primary)  [R51.9] Acute nonintractable headache, unspecified headache type          ED Disposition Condition    Discharge Stable          ED Prescriptions       Medication Sig Dispense Start Date End Date Auth. Provider    lisinopriL-hydrochlorothiazide (PRINZIDE,ZESTORETIC) 20-12.5 mg per tablet Take 1 tablet by mouth once daily. 30 tablet 1/21/2024 -- Rusty Downey Jr., MD          Follow-up Information       Follow up With Specialties Details Why Contact Info    primary care clinic   this week              Rusty Downey Jr., MD  01/21/24 2039

## 2024-04-09 ENCOUNTER — HOSPITAL ENCOUNTER (EMERGENCY)
Facility: HOSPITAL | Age: 84
Discharge: HOME | End: 2024-04-09
Attending: STUDENT IN AN ORGANIZED HEALTH CARE EDUCATION/TRAINING PROGRAM
Payer: OTHER GOVERNMENT

## 2024-04-09 VITALS
WEIGHT: 200 LBS | HEIGHT: 67 IN | RESPIRATION RATE: 20 BRPM | DIASTOLIC BLOOD PRESSURE: 65 MMHG | HEART RATE: 61 BPM | OXYGEN SATURATION: 96 % | BODY MASS INDEX: 31.39 KG/M2 | SYSTOLIC BLOOD PRESSURE: 188 MMHG | TEMPERATURE: 98 F

## 2024-04-09 DIAGNOSIS — M25.552 LEFT HIP PAIN: Primary | ICD-10-CM

## 2024-04-09 LAB
BILIRUB UR QL STRIP: NEGATIVE
CLARITY UR: CLEAR
COLOR UR: YELLOW
GLUCOSE UR QL STRIP: ABNORMAL
HGB UR QL STRIP: NEGATIVE
KETONES UR QL STRIP: NEGATIVE
LEUKOCYTE ESTERASE UR QL STRIP: NEGATIVE
NITRITE UR QL STRIP: NEGATIVE
PH UR STRIP: 6 [PH] (ref 5–8)
PROT UR QL STRIP: NEGATIVE
SP GR UR STRIP: 1.01 (ref 1–1.03)
URN SPEC COLLECT METH UR: ABNORMAL
UROBILINOGEN UR STRIP-ACNC: NEGATIVE EU/DL

## 2024-04-09 PROCEDURE — 99284 EMERGENCY DEPT VISIT MOD MDM: CPT | Mod: 25

## 2024-04-09 PROCEDURE — 81003 URINALYSIS AUTO W/O SCOPE: CPT | Performed by: STUDENT IN AN ORGANIZED HEALTH CARE EDUCATION/TRAINING PROGRAM

## 2024-04-09 PROCEDURE — 72192 CT PELVIS W/O DYE: CPT | Mod: 26,,, | Performed by: RADIOLOGY

## 2024-04-09 PROCEDURE — 72192 CT PELVIS W/O DYE: CPT | Mod: TC

## 2024-04-09 RX ORDER — ACETAMINOPHEN 500 MG
1000 TABLET ORAL EVERY 6 HOURS PRN
Qty: 20 TABLET | Refills: 0 | Status: SHIPPED | OUTPATIENT
Start: 2024-04-09

## 2024-04-09 NOTE — ED PROVIDER NOTES
Encounter Date: 2024       History     Chief Complaint   Patient presents with    Hip Pain     EMS states patient having non-traumatic hip pain.  Patient states his right hip started hurting him 3 weeks ago and last week was seen and given an injection which made the pain go away.  Patient states after that, about a week ago, his left hip started hurting him.  Patient denies any trauma.     83-year-old male status post left hip arthroplasty, hip replacement 44 years ago. He presents to ED with chief complaint of left hip pain.  Patient reports mechanical fall 2 days ago. Also had a mechanical fall 3 weeks after which he sustained right hip pain, and [facility] He reports left hip pain is constant and worsens with ambulation. Hip pain radiates to left anterior lateral thigh. Denies associated motor or sensory deficits.    The history is provided by the patient. No  was used.     Review of patient's allergies indicates:   Allergen Reactions    Aspirin Swelling     Other Reaction(s): Facial pain or swelling, Urticaria, Eruption of skin, Urticaria, Eruption of skin    Aspirin (bulk) Swelling    Attapulgite     Darunavir      Other Reaction(s): Drowsy, Nausea and Vomiting, Drowsy, Nausea and Vomiting    Diphenhydramine Hives    Docusate      Other Reaction(s): Angioedema    Ibuprofen     Naproxen     Sulfa (sulfonamide antibiotics)      Past Medical History:   Diagnosis Date    Hypertension      Past Surgical History:   Procedure Laterality Date    CARPAL TUNNEL RELEASE Bilateral     HIP ARTHROPLASTY Left      Family History   Problem Relation Age of Onset    No Known Problems Mother     No Known Problems Father      Social History     Tobacco Use    Smoking status: Former     Current packs/day: 0.00     Types: Cigarettes     Quit date: 1997     Years since quittin.0    Smokeless tobacco: Never   Substance Use Topics    Alcohol use: No    Drug use: No     Review of  Systems   HENT: Negative.     Respiratory: Negative.     Cardiovascular: Negative.    Gastrointestinal: Negative.    Musculoskeletal:  Positive for arthralgias.       Physical Exam     Initial Vitals [04/09/24 1219]   BP Pulse Resp Temp SpO2   128/80 78 16 97.9 °F (36.6 °C) 96 %      MAP       --         Physical Exam    Nursing note and vitals reviewed.  Constitutional: He appears well-developed.   HENT:   Head: Normocephalic.   Eyes: Pupils are equal, round, and reactive to light.   Neck:   Normal range of motion.  Pulmonary/Chest: Breath sounds normal. No respiratory distress.   Abdominal: Abdomen is soft. Bowel sounds are normal.   Musculoskeletal:         General: Normal range of motion.      Cervical back: Normal range of motion.      Comments: TTP left greater trochanter, no obvious deformity, no leg discrepancies noted     Neurological: He is alert and oriented to person, place, and time. GCS score is 15. GCS eye subscore is 4. GCS verbal subscore is 5. GCS motor subscore is 6.   Skin: Skin is warm. Capillary refill takes less than 2 seconds.   Psychiatric: He has a normal mood and affect.         ED Course   Procedures  Labs Reviewed   URINALYSIS, REFLEX TO URINE CULTURE - Abnormal; Notable for the following components:       Result Value    Glucose, UA Trace (*)     All other components within normal limits    Narrative:     Preferred Collection Type->Urine, Clean Catch  Specimen Source->Urine          Imaging Results               CT Pelvis Without Contrast (Final result)  Result time 04/09/24 14:05:22   Procedure changed from CT Hip Without Contrast Bilateral     Final result by Simone Belle MD (04/09/24 14:05:22)                   Impression:      1. No acute fracture or dislocation.  2. 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  3. Remote ORIF of the left hip.  4. Mild circumferential bladder wall thickening.  May be secondary to  lack of adequate distention.  Differential diagnosis includes acute on chronic cystitis.  Correlate clinically with UA.  5. Mild prostatic hypertrophy.  This report was flagged in Epic as abnormal.      Electronically signed by: Simone Belle  Date:    04/09/2024  Time:    14:05               Narrative:    EXAMINATION:  CT PELVIS WITHOUT CONTRAST    CLINICAL HISTORY:  Hip trauma, fracture suspected, no prior imaging;    TECHNIQUE:  Multiple noncontrast contiguous axial images were obtained through the pelvis and bilateral hips.  Coronal and sagittal re-formatted images reconstructed.    COMPARISON:  11/06/2023.    FINDINGS:  No acute fracture or dislocation.  No significant soft tissue swelling.  There is a complicated mild to moderate right joint effusion.    There is severe right femoroacetabular degenerative osteoarthrosis with complete narrowing of the superior joint space, prominent degenerative subchondral cyst formation and osteophytosis.  Mild left femoroacetabular degenerative osteoarthrosis with mild joint space narrowing and osteophyte formation.  Bilateral anterior and posterior column intact.  Pubic symphysis, bilateral pubic rami and SI joints intact.    Patient is remote ORIF of the posterior column of the left hip.  No CT evidence to suggest hardware failure.  There is mild bone demineralization.    Bladder is partially distended with mild circumferential wall thickening.  Scattered diverticula within the distal colon.  Prostate is mildly enlarged with central coarse calcification.  No significant pelvic or inguinal lymphadenopathy.                                       Medications - No data to display  Medical Decision Making  83-year-old male status post mechanical fall with left hip pain.  Differential includes fracture, dislocation, sprain, others  CT scan shows severe degenerative osteoarthrosis of hip, no fracture, or dislocation.  Patient was treated with Toradol x1 in the ED with good  results  Advised Tylenol p.r.n. RICE therapy  Patient was instructed to follow up with PCP and was given strict return precautions to the ED. The patient voiced understanding and agreed with the plan      Amount and/or Complexity of Data Reviewed  External Data Reviewed: radiology.  Radiology: ordered.    Risk  OTC drugs.                                      Clinical Impression:  Final diagnoses:  [M25.552] Left hip pain (Primary)          ED Disposition Condition    Discharge Stable          ED Prescriptions       Medication Sig Dispense Start Date End Date Auth. Provider    acetaminophen (TYLENOL) 500 MG tablet Take 2 tablets (1,000 mg total) by mouth every 6 (six) hours as needed for Pain. 20 tablet 4/9/2024 -- Ace Marin MD          Follow-up Information       Follow up With Specialties Details Why Contact Info    [facility] -    [address]  [place] [address] [phone]      [facility] Emergency Dept Emergency Medicine  As needed [address] [place] [address] [phone]             Ace Marin MD  04/09/24 3575

## 2024-07-09 ENCOUNTER — HOSPITAL ENCOUNTER (EMERGENCY)
Facility: HOSPITAL | Age: 84
Discharge: HOME | End: 2024-07-09
Attending: STUDENT IN AN ORGANIZED HEALTH CARE EDUCATION/TRAINING PROGRAM
Payer: OTHER GOVERNMENT

## 2024-07-09 VITALS
HEIGHT: 67 IN | OXYGEN SATURATION: 98 % | WEIGHT: 200 LBS | HEART RATE: 65 BPM | DIASTOLIC BLOOD PRESSURE: 98 MMHG | SYSTOLIC BLOOD PRESSURE: 169 MMHG | RESPIRATION RATE: 17 BRPM | BODY MASS INDEX: 31.39 KG/M2 | TEMPERATURE: 98 F

## 2024-07-09 DIAGNOSIS — S00.83XA FACIAL CONTUSION, INITIAL ENCOUNTER: Primary | ICD-10-CM
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
## 2024-07-09 PROCEDURE — 70486 CT MAXILLOFACIAL W/O DYE: CPT | Mod: TC

## 2024-07-09 PROCEDURE — 25000003 PHARM REV CODE 250: Performed by: STUDENT IN AN ORGANIZED HEALTH CARE EDUCATION/TRAINING PROGRAM

## 2024-07-09 PROCEDURE — 99285 EMERGENCY DEPT VISIT HI MDM: CPT | Mod: 25

## 2024-07-09 RX ORDER — HYDROCODONE BITARTRATE AND ACETAMINOPHEN 5; 325 MG/1; MG/1
1 TABLET ORAL
Status: COMPLETED | OUTPATIENT
Start: 2024-07-09 | End: 2024-07-09

## 2024-07-09 RX ORDER — HYDROCODONE BITARTRATE AND ACETAMINOPHEN 5; 325 MG/1; MG/1
1 TABLET ORAL EVERY 6 HOURS PRN
Qty: 12 TABLET | Refills: 0 | Status: SHIPPED | OUTPATIENT
Start: 2024-07-09

## 2024-07-09 RX ADMIN — HYDROCODONE BITARTRATE AND ACETAMINOPHEN 1 TABLET: 5; 325 TABLET ORAL at 08:07

## 2024-07-09 NOTE — DISCHARGE INSTRUCTIONS
Placed ice pack to face for 15 minute at a time every 2-3 hours  May take Norco pain medicine every 6 hours for pain  Increased rest
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
## 2024-10-26 ENCOUNTER — HOSPITAL ENCOUNTER (EMERGENCY)
Facility: HOSPITAL | Age: 84
Discharge: HOME | End: 2024-10-26
Attending: STUDENT IN AN ORGANIZED HEALTH CARE EDUCATION/TRAINING PROGRAM
Payer: OTHER GOVERNMENT

## 2024-10-26 VITALS
TEMPERATURE: 99 F | WEIGHT: 200 LBS | HEART RATE: 88 BPM | SYSTOLIC BLOOD PRESSURE: 123 MMHG | BODY MASS INDEX: 31.32 KG/M2 | OXYGEN SATURATION: 95 % | DIASTOLIC BLOOD PRESSURE: 84 MMHG | RESPIRATION RATE: 18 BRPM

## 2024-10-26 DIAGNOSIS — R53.1 WEAKNESS: ICD-10-CM

## 2024-10-26 DIAGNOSIS — M16.0 OSTEOARTHRITIS OF BOTH HIPS, UNSPECIFIED OSTEOARTHRITIS TYPE: Primary | ICD-10-CM

## 2024-10-26 LAB
ALBUMIN SERPL BCP-MCNC: 3.5 G/DL (ref 3.5–5.2)
ALP SERPL-CCNC: 69 U/L (ref 40–150)
ALT SERPL W/O P-5'-P-CCNC: 9 U/L (ref 10–44)
ANION GAP SERPL CALC-SCNC: 10 MMOL/L (ref 8–16)
AST SERPL-CCNC: 12 U/L (ref 10–40)
BASOPHILS # BLD AUTO: 0.03 K/UL (ref 0–0.2)
BASOPHILS NFR BLD: 0.3 % (ref 0–1.9)
BILIRUB SERPL-MCNC: 0.8 MG/DL (ref 0.1–1)
BUN SERPL-MCNC: 31 MG/DL (ref 8–23)
CALCIUM SERPL-MCNC: 9 MG/DL (ref 8.7–10.5)
CHLORIDE SERPL-SCNC: 105 MMOL/L (ref 95–110)
CO2 SERPL-SCNC: 21 MMOL/L (ref 23–29)
CREAT SERPL-MCNC: 1.4 MG/DL (ref 0.5–1.4)
DIFFERENTIAL METHOD BLD: ABNORMAL
EOSINOPHIL # BLD AUTO: 0 K/UL (ref 0–0.5)
EOSINOPHIL NFR BLD: 0.4 % (ref 0–8)
ERYTHROCYTE [DISTWIDTH] IN BLOOD BY AUTOMATED COUNT: 12.4 % (ref 11.5–14.5)
EST. GFR  (NO RACE VARIABLE): 50.2 ML/MIN/1.73 M^2
GLUCOSE SERPL-MCNC: 130 MG/DL (ref 70–110)
HCT VFR BLD AUTO: 32.2 % (ref 40–54)
HGB BLD-MCNC: 10.7 G/DL (ref 14–18)
IMM GRANULOCYTES # BLD AUTO: 0.04 K/UL (ref 0–0.04)
IMM GRANULOCYTES NFR BLD AUTO: 0.4 % (ref 0–0.5)
INFLUENZA A, MOLECULAR: NEGATIVE
INFLUENZA B, MOLECULAR: NEGATIVE
LYMPHOCYTES # BLD AUTO: 0.6 K/UL (ref 1–4.8)
LYMPHOCYTES NFR BLD: 6.2 % (ref 18–48)
MCH RBC QN AUTO: 30.7 PG (ref 27–31)
MCHC RBC AUTO-ENTMCNC: 33.2 G/DL (ref 32–36)
MCV RBC AUTO: 93 FL (ref 82–98)
MONOCYTES # BLD AUTO: 0.7 K/UL (ref 0.3–1)
MONOCYTES NFR BLD: 7.9 % (ref 4–15)
NEUTROPHILS # BLD AUTO: 8 K/UL (ref 1.8–7.7)
NEUTROPHILS NFR BLD: 84.8 % (ref 38–73)
NRBC BLD-RTO: 0 /100 WBC
PLATELET # BLD AUTO: 177 K/UL (ref 150–450)
PMV BLD AUTO: 9.6 FL (ref 9.2–12.9)
POTASSIUM SERPL-SCNC: 3.8 MMOL/L (ref 3.5–5.1)
PROT SERPL-MCNC: 6.7 G/DL (ref 6–8.4)
RBC # BLD AUTO: 3.48 M/UL (ref 4.6–6.2)
SARS-COV-2 RDRP RESP QL NAA+PROBE: NEGATIVE
SODIUM SERPL-SCNC: 136 MMOL/L (ref 136–145)
SPECIMEN SOURCE: NORMAL
WBC # BLD AUTO: 9.42 K/UL (ref 3.9–12.7)

## 2024-10-26 PROCEDURE — 25000003 PHARM REV CODE 250: Performed by: STUDENT IN AN ORGANIZED HEALTH CARE EDUCATION/TRAINING PROGRAM

## 2024-10-26 PROCEDURE — 73502 X-RAY EXAM HIP UNI 2-3 VIEWS: CPT | Mod: 26,RT,, | Performed by: RADIOLOGY

## 2024-10-26 PROCEDURE — 73502 X-RAY EXAM HIP UNI 2-3 VIEWS: CPT | Mod: TC,RT

## 2024-10-26 PROCEDURE — 85025 COMPLETE CBC W/AUTO DIFF WBC: CPT | Performed by: STUDENT IN AN ORGANIZED HEALTH CARE EDUCATION/TRAINING PROGRAM

## 2024-10-26 PROCEDURE — 87502 INFLUENZA DNA AMP PROBE: CPT | Performed by: STUDENT IN AN ORGANIZED HEALTH CARE EDUCATION/TRAINING PROGRAM

## 2024-10-26 PROCEDURE — 87635 SARS-COV-2 COVID-19 AMP PRB: CPT | Performed by: STUDENT IN AN ORGANIZED HEALTH CARE EDUCATION/TRAINING PROGRAM

## 2024-10-26 PROCEDURE — 93010 ELECTROCARDIOGRAM REPORT: CPT | Mod: ,,, | Performed by: INTERNAL MEDICINE

## 2024-10-26 PROCEDURE — 80053 COMPREHEN METABOLIC PANEL: CPT | Performed by: STUDENT IN AN ORGANIZED HEALTH CARE EDUCATION/TRAINING PROGRAM

## 2024-10-26 PROCEDURE — 93005 ELECTROCARDIOGRAM TRACING: CPT

## 2024-10-26 PROCEDURE — 99285 EMERGENCY DEPT VISIT HI MDM: CPT | Mod: 25

## 2024-10-26 RX ORDER — HYDROCODONE BITARTRATE AND ACETAMINOPHEN 7.5; 325 MG/1; MG/1
1 TABLET ORAL ONCE
Status: COMPLETED | OUTPATIENT
Start: 2024-10-26 | End: 2024-10-26

## 2024-10-26 RX ADMIN — HYDROCODONE BITARTRATE AND ACETAMINOPHEN 1 TABLET: 7.5; 325 TABLET ORAL at 09:10

## 2024-10-29 LAB
OHS QRS DURATION: 80 MS
OHS QTC CALCULATION: 413 MS

## 2025-01-25 ENCOUNTER — HOSPITAL ENCOUNTER (EMERGENCY)
Facility: HOSPITAL | Age: 85
Discharge: HOME | End: 2025-01-25
Attending: EMERGENCY MEDICINE
Payer: OTHER GOVERNMENT

## 2025-01-25 VITALS
OXYGEN SATURATION: 99 % | RESPIRATION RATE: 20 BRPM | HEIGHT: 67 IN | TEMPERATURE: 98 F | BODY MASS INDEX: 31.39 KG/M2 | HEART RATE: 79 BPM | DIASTOLIC BLOOD PRESSURE: 70 MMHG | SYSTOLIC BLOOD PRESSURE: 120 MMHG | WEIGHT: 200 LBS

## 2025-01-25 DIAGNOSIS — R19.7 DIARRHEA, UNSPECIFIED TYPE: Primary | ICD-10-CM

## 2025-01-25 DIAGNOSIS — E86.0 DEHYDRATION: ICD-10-CM

## 2025-01-25 DIAGNOSIS — R55 NEAR SYNCOPE: ICD-10-CM

## 2025-01-25 LAB
ALBUMIN SERPL BCP-MCNC: 3.7 G/DL (ref 3.5–5.2)
ALP SERPL-CCNC: 89 U/L (ref 40–150)
ALT SERPL W/O P-5'-P-CCNC: 22 U/L (ref 10–44)
ANION GAP SERPL CALC-SCNC: 14 MMOL/L (ref 8–16)
AST SERPL-CCNC: 22 U/L (ref 10–40)
BASOPHILS # BLD AUTO: 0.09 K/UL (ref 0–0.2)
BASOPHILS NFR BLD: 1 % (ref 0–1.9)
BILIRUB SERPL-MCNC: 0.4 MG/DL (ref 0.1–1)
BNP SERPL-MCNC: <10 PG/ML (ref 0–99)
BUN SERPL-MCNC: 51 MG/DL (ref 8–23)
CALCIUM SERPL-MCNC: 8.6 MG/DL (ref 8.7–10.5)
CHLORIDE SERPL-SCNC: 99 MMOL/L (ref 95–110)
CO2 SERPL-SCNC: 17 MMOL/L (ref 23–29)
CREAT SERPL-MCNC: 2.7 MG/DL (ref 0.5–1.4)
DIFFERENTIAL METHOD BLD: ABNORMAL
EOSINOPHIL # BLD AUTO: 0.3 K/UL (ref 0–0.5)
EOSINOPHIL NFR BLD: 3.3 % (ref 0–8)
ERYTHROCYTE [DISTWIDTH] IN BLOOD BY AUTOMATED COUNT: 12.6 % (ref 11.5–14.5)
EST. GFR  (NO RACE VARIABLE): 22.8 ML/MIN/1.73 M^2
GLUCOSE SERPL-MCNC: 173 MG/DL (ref 70–110)
HCT VFR BLD AUTO: 34.9 % (ref 40–54)
HGB BLD-MCNC: 11.5 G/DL (ref 14–18)
IMM GRANULOCYTES # BLD AUTO: 0.05 K/UL (ref 0–0.04)
IMM GRANULOCYTES NFR BLD AUTO: 0.5 % (ref 0–0.5)
INFLUENZA A, MOLECULAR: NEGATIVE
INFLUENZA B, MOLECULAR: NEGATIVE
LACTATE SERPL-SCNC: 1.8 MMOL/L (ref 0.5–2.2)
LIPASE SERPL-CCNC: 38 U/L (ref 4–60)
LYMPHOCYTES # BLD AUTO: 2 K/UL (ref 1–4.8)
LYMPHOCYTES NFR BLD: 22.1 % (ref 18–48)
MAGNESIUM SERPL-MCNC: 1.7 MG/DL (ref 1.6–2.6)
MCH RBC QN AUTO: 30.9 PG (ref 27–31)
MCHC RBC AUTO-ENTMCNC: 33 G/DL (ref 32–36)
MCV RBC AUTO: 94 FL (ref 82–98)
MONOCYTES # BLD AUTO: 0.7 K/UL (ref 0.3–1)
MONOCYTES NFR BLD: 7.9 % (ref 4–15)
NEUTROPHILS # BLD AUTO: 6 K/UL (ref 1.8–7.7)
NEUTROPHILS NFR BLD: 65.2 % (ref 38–73)
NRBC BLD-RTO: 0 /100 WBC
PLATELET # BLD AUTO: 209 K/UL (ref 150–450)
PMV BLD AUTO: 9.7 FL (ref 9.2–12.9)
POTASSIUM SERPL-SCNC: 4.6 MMOL/L (ref 3.5–5.1)
PROT SERPL-MCNC: 7.4 G/DL (ref 6–8.4)
RBC # BLD AUTO: 3.72 M/UL (ref 4.6–6.2)
SARS-COV-2 RDRP RESP QL NAA+PROBE: NEGATIVE
SODIUM SERPL-SCNC: 130 MMOL/L (ref 136–145)
SPECIMEN SOURCE: NORMAL
TROPONIN I SERPL DL<=0.01 NG/ML-MCNC: 0.01 NG/ML (ref 0–0.03)
WBC # BLD AUTO: 9.17 K/UL (ref 3.9–12.7)

## 2025-01-25 PROCEDURE — 83880 ASSAY OF NATRIURETIC PEPTIDE: CPT | Performed by: EMERGENCY MEDICINE

## 2025-01-25 PROCEDURE — 96360 HYDRATION IV INFUSION INIT: CPT

## 2025-01-25 PROCEDURE — 93010 ELECTROCARDIOGRAM REPORT: CPT | Mod: ,,, | Performed by: INTERNAL MEDICINE

## 2025-01-25 PROCEDURE — 99284 EMERGENCY DEPT VISIT MOD MDM: CPT | Mod: 25

## 2025-01-25 PROCEDURE — 63600175 PHARM REV CODE 636 W HCPCS: Performed by: EMERGENCY MEDICINE

## 2025-01-25 PROCEDURE — 84484 ASSAY OF TROPONIN QUANT: CPT | Performed by: EMERGENCY MEDICINE

## 2025-01-25 PROCEDURE — 87635 SARS-COV-2 COVID-19 AMP PRB: CPT | Performed by: EMERGENCY MEDICINE

## 2025-01-25 PROCEDURE — 85025 COMPLETE CBC W/AUTO DIFF WBC: CPT | Performed by: EMERGENCY MEDICINE

## 2025-01-25 PROCEDURE — 93005 ELECTROCARDIOGRAM TRACING: CPT

## 2025-01-25 PROCEDURE — 83690 ASSAY OF LIPASE: CPT | Performed by: EMERGENCY MEDICINE

## 2025-01-25 PROCEDURE — 83605 ASSAY OF LACTIC ACID: CPT | Performed by: EMERGENCY MEDICINE

## 2025-01-25 PROCEDURE — 36415 COLL VENOUS BLD VENIPUNCTURE: CPT | Performed by: EMERGENCY MEDICINE

## 2025-01-25 PROCEDURE — 87502 INFLUENZA DNA AMP PROBE: CPT | Performed by: EMERGENCY MEDICINE

## 2025-01-25 PROCEDURE — 80053 COMPREHEN METABOLIC PANEL: CPT | Performed by: EMERGENCY MEDICINE

## 2025-01-25 PROCEDURE — 83735 ASSAY OF MAGNESIUM: CPT | Performed by: EMERGENCY MEDICINE

## 2025-01-25 PROCEDURE — 96361 HYDRATE IV INFUSION ADD-ON: CPT

## 2025-01-25 RX ORDER — GUAIFENESIN 600 MG/1
1 TABLET, EXTENDED RELEASE ORAL 2 TIMES DAILY
COMMUNITY
Start: 2024-11-19

## 2025-01-25 RX ORDER — FLUTICASONE PROPIONATE 50 MCG
SPRAY, SUSPENSION (ML) NASAL
COMMUNITY
Start: 2024-05-09 | End: 2025-05-10

## 2025-01-25 RX ORDER — HYDROCHLOROTHIAZIDE 12.5 MG/1
12.5 CAPSULE ORAL
COMMUNITY
Start: 2024-12-10

## 2025-01-25 RX ADMIN — SODIUM CHLORIDE, POTASSIUM CHLORIDE, SODIUM LACTATE AND CALCIUM CHLORIDE 1000 ML: 600; 310; 30; 20 INJECTION, SOLUTION INTRAVENOUS at 06:01

## 2025-01-25 RX ADMIN — SODIUM CHLORIDE, POTASSIUM CHLORIDE, SODIUM LACTATE AND CALCIUM CHLORIDE 1000 ML: 600; 310; 30; 20 INJECTION, SOLUTION INTRAVENOUS at 05:01

## 2025-01-25 NOTE — ED PROVIDER NOTES
"Encounter Date: 2025       History     Chief Complaint   Patient presents with    Diarrhea     Pt. States he has diarrhea beginning last night. Pt. States he feels weak. States, "My BP was low. The top was in the 60's when I was at home.". AAOX4.      84-year-old male, here from home via private vehicle for evaluation and treatment of generalized weakness after having several episodes of loose stools since last night.  Patient states that initially he was constipated so he took some laxatives and then began having diarrhea.  He states that having loose stools is not uncommon for him.  He believes it is secondary to some of the medications he takes, but the loose stools he has been having since last night has been worse than normal.  Denies any bloody or dark stools.  No nausea, no vomiting, no fever.  No other complaints.        Review of patient's allergies indicates:   Allergen Reactions    Aspirin Swelling     Other Reaction(s): Facial pain or swelling, Urticaria, Eruption of skin, Urticaria, Eruption of skin    Aspirin (bulk) Swelling    Attapulgite     Darunavir      Other Reaction(s): Drowsy, Nausea and Vomiting, Drowsy, Nausea and Vomiting    Diphenhydramine Hives    Docusate      Other Reaction(s): Angioedema    Ibuprofen     Naproxen     Sulfa (sulfonamide antibiotics)      Past Medical History:   Diagnosis Date    Hypertension      Past Surgical History:   Procedure Laterality Date    CARPAL TUNNEL RELEASE Bilateral     HIP ARTHROPLASTY Left      Family History   Problem Relation Name Age of Onset    No Known Problems Mother      No Known Problems Father       Social History     Tobacco Use    Smoking status: Former     Current packs/day: 0.00     Types: Cigarettes     Quit date: 1997     Years since quittin.7    Smokeless tobacco: Never   Substance Use Topics    Alcohol use: No    Drug use: No     Review of Systems   Constitutional:  Positive for fatigue.   Gastrointestinal:  Positive for " diarrhea.   Neurological:  Positive for weakness.   All other systems reviewed and are negative.      Physical Exam     Initial Vitals [01/25/25 1451]   BP Pulse Resp Temp SpO2   115/74 81 17 97.8 °F (36.6 °C) 97 %      MAP       --         Physical Exam    Nursing note and vitals reviewed.  Constitutional: He appears well-developed and well-nourished. He is not diaphoretic. No distress.   HENT:   Head: Normocephalic and atraumatic.   Nose: Nose normal. Mouth/Throat: Oropharynx is clear and moist. No oropharyngeal exudate.   Eyes: Conjunctivae and EOM are normal. Pupils are equal, round, and reactive to light. No scleral icterus.   Neck: Neck supple.   Normal range of motion.  Cardiovascular:  Normal rate, regular rhythm, normal heart sounds and intact distal pulses.           No murmur heard.  Pulmonary/Chest: Breath sounds normal. No stridor. No respiratory distress. He has no wheezes. He has no rhonchi. He has no rales.   Abdominal: Abdomen is soft. Bowel sounds are normal. He exhibits no distension. There is abdominal tenderness (Mild generalized tenderness). There is no rebound and no guarding.   Musculoskeletal:         General: No tenderness or edema. Normal range of motion.      Cervical back: Normal range of motion and neck supple.     Neurological: He is alert and oriented to person, place, and time. He has normal strength. No cranial nerve deficit or sensory deficit. GCS score is 15. GCS eye subscore is 4. GCS verbal subscore is 5. GCS motor subscore is 6.   Skin: Skin is warm and dry. Capillary refill takes less than 2 seconds. No rash noted. No erythema.   Psychiatric: He has a normal mood and affect. His behavior is normal.         ED Course   Procedures  Labs Reviewed   CBC W/ AUTO DIFFERENTIAL - Abnormal       Result Value    WBC 9.17      RBC 3.72 (*)     Hemoglobin 11.5 (*)     Hematocrit 34.9 (*)     MCV 94      MCH 30.9      MCHC 33.0      RDW 12.6      Platelets 209      MPV 9.7      Immature  Granulocytes 0.5      Gran # (ANC) 6.0      Immature Grans (Abs) 0.05 (*)     Lymph # 2.0      Mono # 0.7      Eos # 0.3      Baso # 0.09      nRBC 0      Gran % 65.2      Lymph % 22.1      Mono % 7.9      Eosinophil % 3.3      Basophil % 1.0      Differential Method Automated      Narrative:     Recoll. [id number] by ASHLYN at 01/25/2025 16:04, reason: SPECIMEN   CLOTTED   COMPREHENSIVE METABOLIC PANEL - Abnormal    Sodium 130 (*)     Potassium 4.6      Chloride 99      CO2 17 (*)     Glucose 173 (*)     BUN 51 (*)     Creatinine 2.7 (*)     Calcium 8.6 (*)     Total Protein 7.4      Albumin 3.7      Total Bilirubin 0.4      Alkaline Phosphatase 89      AST 22      ALT 22      eGFR 22.8 (*)     Anion Gap 14     INFLUENZA A & B BY MOLECULAR    Influenza A, Molecular Negative      Influenza B, Molecular Negative      Flu A & B Source Nasal Swab     MAGNESIUM    Magnesium 1.7     LIPASE    Lipase 38     LACTIC ACID, PLASMA    Lactate (Lactic Acid) 1.8     TROPONIN I    Troponin I 0.009     SARS-COV-2 RNA AMPLIFICATION, QUAL    SARS-CoV-2 RNA, Amplification, Qual Negative     B-TYPE NATRIURETIC PEPTIDE   B-TYPE NATRIURETIC PEPTIDE    BNP <10       EKG Readings: (Independently Interpreted)   EKG personally reviewed by me shows sinus rhythm, premature atrial complexes, otherwise normal, 67 beats per minute, NM interval 162, .  No ST elevation or depression, no T-wave change, no arrhythmia.       Imaging Results    None          Medications   lactated ringers bolus 1,000 mL (0 mLs Intravenous Stopped 1/25/25 1826)   lactated ringers bolus 1,000 mL (0 mLs Intravenous Stopped 1/25/25 1945)     Medical Decision Making    Lab work reveals no acute process.  We will discharge patient home to follow up with his primary care provider.    Amount and/or Complexity of Data Reviewed  Labs: ordered.                                      Clinical Impression:  Final diagnoses:  [R55] Near syncope  [R19.7] Diarrhea, unspecified type  (Primary)  [E86.0] Dehydration          ED Disposition Condition    Discharge Stable          ED Prescriptions    None       Follow-up Information       Follow up With Specialties Details Why Contact Info    Shannon Huizar [facility] -  Call in 2 days  [address]  [place] [address] [phone]      [place] - Emergency Dept Emergency Medicine  As needed, If symptoms worsen [address] [facility] [address]  [phone]             Ben Billings, DO  01/25/25 2226

## 2025-01-26 NOTE — DISCHARGE INSTRUCTIONS
Your labs tonight were normal except for elevation of your BUN and creatinine, which are numbers used to  your kidney function.  Here, you have been given 2 L of IV fluids.  This should help reverse the dehydration, but she will need to continue drinking fluids at home, and you will need to use over-the-counter Imodium to help with the loose stools.  Follow[facility] with [facility] after the weekend, return here if worse

## 2025-01-27 LAB
OHS QRS DURATION: 76 MS
OHS QTC CALCULATION: 418 MS

## 2025-06-27 ENCOUNTER — HOSPITAL ENCOUNTER (EMERGENCY)
Facility: HOSPITAL | Age: 85
Discharge: HOME | End: 2025-06-27
Attending: EMERGENCY MEDICINE
Payer: OTHER GOVERNMENT

## 2025-06-27 VITALS
HEART RATE: 75 BPM | OXYGEN SATURATION: 97 % | RESPIRATION RATE: 16 BRPM | HEIGHT: 67 IN | SYSTOLIC BLOOD PRESSURE: 146 MMHG | DIASTOLIC BLOOD PRESSURE: 83 MMHG | WEIGHT: 200 LBS | BODY MASS INDEX: 31.39 KG/M2 | TEMPERATURE: 98 F

## 2025-06-27 DIAGNOSIS — M25.532 PAIN AND SWELLING OF LEFT WRIST: ICD-10-CM

## 2025-06-27 DIAGNOSIS — S60.211A HEMATOMA OF RIGHT WRIST: Primary | ICD-10-CM

## 2025-06-27 DIAGNOSIS — M25.432 PAIN AND SWELLING OF LEFT WRIST: ICD-10-CM

## 2025-06-27 PROCEDURE — 10140 I&D HMTMA SEROMA/FLUID COLLJ: CPT

## 2025-06-27 PROCEDURE — 63600175 PHARM REV CODE 636 W HCPCS: Performed by: EMERGENCY MEDICINE

## 2025-06-27 PROCEDURE — 73110 X-RAY EXAM OF WRIST: CPT | Mod: TC,LT

## 2025-06-27 PROCEDURE — 73110 X-RAY EXAM OF WRIST: CPT | Mod: 26,LT,, | Performed by: RADIOLOGY

## 2025-06-27 PROCEDURE — 99283 EMERGENCY DEPT VISIT LOW MDM: CPT | Mod: 25

## 2025-06-27 RX ORDER — LIDOCAINE HYDROCHLORIDE 10 MG/ML
5 INJECTION, SOLUTION EPIDURAL; INFILTRATION; INTRACAUDAL; PERINEURAL
Status: COMPLETED | OUTPATIENT
Start: 2025-06-27 | End: 2025-06-27

## 2025-06-27 RX ADMIN — LIDOCAINE HYDROCHLORIDE 50 MG: 10 INJECTION, SOLUTION EPIDURAL; INFILTRATION; INTRACAUDAL; PERINEURAL at 05:06

## 2025-06-27 NOTE — ED PROVIDER NOTES
"Encounter Date: 6/27/2025       History     Chief Complaint   Patient presents with    Wrist Injury     Patient requesting to be seen for left wrist injury.  Patient states he struck his wrist on his car and experienced a sudden sharp pain, swelling and bruising.  Patient also noted to be SOB with increased rate, effort, and difficulty speaking in full sentences after ambulating to the restroom.  Patient states his SOB has gotten worse over the last week but he is not concerned about that.  Patient breathing improved at rest.     Patient is an 84-year-old male here for evaluation and treatment of left wrist pain.  Patient states he has a history of an infection in the left wrist.  This was about 1 year ago.  He states the wrist and gotten much better, but today he was looking under his car for loose wire when he bumped the inner aspect of the left wrist, near the radial head, against something under the car.  He immediately felt severe pain, and a large "bump" formed in the area.  This area consistent with a hematoma, measuring approximately 2.5 cm in diameter.  They areas tender to palpation.  Pain is also increased when the patient moves his wrist.  Denies any other complaints.  In triage, it was noted that the patient appear to be short of breath with exertion but patient denies this and states he feels fine.  O2 saturations 99% on room air.      Review of patient's allergies indicates:   Allergen Reactions    Aspirin Swelling     Other Reaction(s): Facial pain or swelling, Urticaria, Eruption of skin, Urticaria, Eruption of skin    Aspirin (bulk) Swelling    Attapulgite     Darunavir      Other Reaction(s): Drowsy, Nausea and Vomiting, Drowsy, Nausea and Vomiting    Diphenhydramine Hives    Docusate      Other Reaction(s): Angioedema    Ibuprofen     Naproxen     Sulfa (sulfonamide antibiotics)      Past Medical History:   Diagnosis Date    Hypertension      Past Surgical History:   Procedure Laterality Date    " CARPAL TUNNEL RELEASE Bilateral     HIP ARTHROPLASTY Left      Family History   Problem Relation Name Age of Onset    No Known Problems Mother      No Known Problems Father       Social History[1]  Review of Systems   Musculoskeletal:  Positive for arthralgias (Left wrist injury).   All other systems reviewed and are negative.      Physical Exam     Initial Vitals [06/27/25 1651]   BP Pulse Resp Temp SpO2   (!) 147/69 101 (!) 24 98 °F (36.7 °C) 97 %      MAP       --         Physical Exam    Nursing note and vitals reviewed.  Constitutional: He appears well-developed and well-nourished. He is not diaphoretic. No distress.   HENT:   Head: Normocephalic and atraumatic.   Nose: Nose normal. Mouth/Throat: Oropharynx is clear and moist. No oropharyngeal exudate.   Eyes: Conjunctivae and EOM are normal. Pupils are equal, round, and reactive to light. No scleral icterus.   Neck: Neck supple. No JVD present.   Normal range of motion.  Cardiovascular:  Normal rate, regular rhythm, normal heart sounds and intact distal pulses.           No murmur heard.  Pulmonary/Chest: Breath sounds normal. No stridor. No respiratory distress. He has no wheezes. He has no rhonchi. He has no rales.   Abdominal: Abdomen is soft. Bowel sounds are normal. He exhibits no distension. There is no abdominal tenderness.   Musculoskeletal:         General: No tenderness or edema. Normal range of motion.      Cervical back: Normal range of motion and neck supple.     Neurological: He is alert and oriented to person, place, and time. He has normal strength. No cranial nerve deficit or sensory deficit. GCS score is 15. GCS eye subscore is 4. GCS verbal subscore is 5. GCS motor subscore is 6.   Skin: Skin is warm and dry. Capillary refill takes less than 2 seconds. No rash noted. No erythema.   Psychiatric: He has a normal mood and affect. His behavior is normal.         ED Course   I & D - Incision and Drainage    Date/Time: 6/27/2025 6:05 PM  Location  procedure was performed: [facility] EMERGENCY DEPARTMENT    Performed by: Bryon Rosales MD  Authorized by: Bryon Rosales MD  Consent Done: Yes  Consent: Verbal consent obtained. Written consent not obtained  Consent given by: patient  Patient understanding: patient states understanding of the procedure being performed  Patient consent: the patient's understanding of the procedure matches consent given  Procedure consent: procedure consent matches procedure scheduled  Relevant documents: relevant documents present and verified  Test results: test results available and properly labeled  Site marked: the operative site was marked  Imaging studies: imaging studies available  Patient identity confirmed:  and name  Type: hematoma  Body area: upper extremity  Location details: left arm  Anesthesia: local infiltration    Anesthesia:  Local Anesthetic: lidocaine 1% without epinephrine  Anesthetic total: 0.5 mL    Patient sedated: no  Description of findings: Small hematoma   Complexity: simple  Drainage: bloody  Drainage amount: moderate (1cc)  Wound treatment: Needle aspiration, 18 gauge, withdrew 1 cc of dark blood.  Complications: No  Patient tolerance: Patient tolerated the procedure well with no immediate complications        Labs Reviewed - No data to display       Imaging Results              X-Ray Wrist Complete Left (Final result)  Result time 25 17:41:48      Final result by Felipe Wang DO (25 17:41:48)                   Impression:      No acute osseous abnormality of the wrist.    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      Electronically signed by: Felipe Wang  Date:    2025  Time:    17:41               Narrative:    EXAMINATION:  XR WRIST COMPLETE 3 VIEWS LEFT    CLINICAL HISTORY:  Pain in left wrist    TECHNIQUE:  PA, lateral, and oblique views of the left wrist were performed.    COMPARISON:  None    FINDINGS:  There is osteopenia.  There is  no acute fracture or dislocation.  There are advanced degenerative changes of the wrist, including severe joint space narrowing of the radiocarpal articulations, widening of the scapholunate interval, and descent of the capitate, with posterior positioning of the capitate with respect to the lunate.  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  Scattered subchondral cystic changes noted throughout the wrist.  There is soft tissue edema.                                    X-Rays:   Independently Interpreted Readings:   Other Readings:  I have personally reviewed the patient's left wrist x-ray and I agree with the radiology findings as follows:      There is osteopenia.  There is no acute fracture or dislocation.  There are advanced degenerative changes of the wrist, including severe joint space narrowing of the radiocarpal articulations, widening of the scapholunate interval, and descent of the capitate, with posterior positioning of the capitate with respect to the lunate.  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  Scattered subchondral cystic changes noted throughout the wrist.  There is soft tissue edema.    Medications   LIDOcaine (PF) 10 mg/ml (1%) injection 50 mg (50 mg Infiltration Given by Provider 6/27/25 4009)     Medical Decision Making  Differential includes fracture, sprain, strain, foreign body, etc.     No fractures or dislocations, but patient has severe bony degeneration as well as ligament displacement or degeneration.  He requested that the hematoma be evacuated, which was done with success.  He was told he needs to follow-up with his [facility] doctors for re-evaluation of his degenerative wrist joint.  He will return here as needed or if worse in any way.    Amount and/or Complexity of Data Reviewed  Radiology: ordered.    Risk  Prescription drug management.                                      Clinical Impression:  Final diagnoses:  [M25.532,  M25.432] Pain and swelling of left wrist  [S60.211A] Hematoma of right wrist (Primary)          ED Disposition Condition    Discharge Stable          ED Prescriptions    None       Follow-up Information       Follow up With Specialties Details Why Contact Shannon Johnson [facility] -  Schedule an appointment as soon as possible for a visit   [address] [facility]  [place] [address]  [phone]      [place] - Emergency Dept Emergency Medicine  If symptoms worsen [address] [facility] [address] [phone]                   [1]   Social History  Tobacco Use    Smoking status: Former     Current packs/day: 0.00     Types: Cigarettes     Quit date: 1997     Years since quittin.2    Smokeless tobacco: Never   Vaping Use    Vaping status: Never Used   Substance Use Topics    Alcohol use: No    Drug use: No        Bryon Roasles MD  [phone]

## 2025-06-27 NOTE — ED TRIAGE NOTES
Pt states he went to fix a wire under his truck and when he went to get up he bumped the abscess on his left wrist. Pt states his wrist pain is a 9 out of 10. Pt states due to the pain it is difficult to move his left fingers and left wrist.  Pt denies chest pain but states he has shortness of breath on excerption.

## 2025-06-27 NOTE — DISCHARGE INSTRUCTIONS
Keep a slight bit of pressure on the area in question to help keep the hematoma from forming again.  Follow-up with your [facility] orthopedist, or return here as needed

## 2025-07-02 LAB
OHS QRS DURATION: 72 MS
OHS QTC CALCULATION: 450 MS

## (undated) DEVICE — DRAPE STERI-DRAPE 1000 17X11IN

## (undated) DEVICE — SUT ETHILON 3-0 PS2 18 BLK

## (undated) DEVICE — PACK CUSTOM UNIV BASIN SLI

## (undated) DEVICE — SLEEVE SCD EXPRESS CALF MEDIUM

## (undated) DEVICE — SEE MEDLINE ITEM 157131

## (undated) DEVICE — GAUZE SPONGE BULKEE 6X6.75IN

## (undated) DEVICE — GOWN B1 X-LG X-LONG

## (undated) DEVICE — UNDERGLOVES BIOGEL PI SIZE 7.5

## (undated) DEVICE — SEE MEDLINE ITEM 146308

## (undated) DEVICE — DRESSING GAUZE 6PLY 4X4

## (undated) DEVICE — DRESSING N ADH OIL EMUL 3X3

## (undated) DEVICE — COLLECTOR SPECIMEN ANAEROBIC

## (undated) DEVICE — PAD CAST SPECIALIST STRL 4

## (undated) DEVICE — ELECTRODE REM PLYHSV RETURN 9

## (undated) DEVICE — SEE MEDLINE ITEM 152622

## (undated) DEVICE — INSTRUMENT FRAZIER 10FR W/VENT

## (undated) DEVICE — SWAB CULTURETTE SINGLE

## (undated) DEVICE — SUT ETHICON 3-0 BLK MONO PS

## (undated) DEVICE — BANDAGE KERLIX AMD

## (undated) DEVICE — SOL 9P NACL IRR PIC IL

## (undated) DEVICE — LINER SUCTION 3000CC

## (undated) DEVICE — GLOVE SURG ULTRA TOUCH 8

## (undated) DEVICE — PACK ARTHROSCOPY W/ISO BAC

## (undated) DEVICE — STRAP OR TABLE 5IN X 72IN

## (undated) DEVICE — APPLICATOR CHLORAPREP ORN 26ML

## (undated) DEVICE — BANDAGE ESMARK LATEX FREE 4INX